# Patient Record
Sex: FEMALE | Race: WHITE | NOT HISPANIC OR LATINO | Employment: FULL TIME | URBAN - METROPOLITAN AREA
[De-identification: names, ages, dates, MRNs, and addresses within clinical notes are randomized per-mention and may not be internally consistent; named-entity substitution may affect disease eponyms.]

---

## 2019-04-08 ENCOUNTER — OFFICE VISIT (OUTPATIENT)
Dept: FAMILY MEDICINE | Facility: CLINIC | Age: 29
End: 2019-04-08
Payer: COMMERCIAL

## 2019-04-08 VITALS
OXYGEN SATURATION: 100 % | WEIGHT: 149 LBS | DIASTOLIC BLOOD PRESSURE: 77 MMHG | SYSTOLIC BLOOD PRESSURE: 121 MMHG | HEART RATE: 72 BPM

## 2019-04-08 DIAGNOSIS — R22.1 NECK FULLNESS: Primary | ICD-10-CM

## 2019-04-08 DIAGNOSIS — S86.912S STRAIN OF LEFT KNEE, SEQUELA: ICD-10-CM

## 2019-04-08 DIAGNOSIS — E04.9 THYROID ENLARGED: ICD-10-CM

## 2019-04-08 DIAGNOSIS — M79.675 PAIN OF TOE OF LEFT FOOT: ICD-10-CM

## 2019-04-08 DIAGNOSIS — I73.00 RAYNAUD'S DISEASE WITHOUT GANGRENE: ICD-10-CM

## 2019-04-08 PROBLEM — S86.912A STRAIN OF LEFT KNEE: Status: ACTIVE | Noted: 2019-04-08

## 2019-04-08 LAB
T3 SERPL-MCNC: 101 NG/DL (ref 60–181)
T4 FREE SERPL-MCNC: 0.97 NG/DL (ref 0.76–1.46)
TSH SERPL DL<=0.005 MIU/L-ACNC: 1.46 MU/L (ref 0.4–4)

## 2019-04-08 RX ORDER — CHOLECALCIFEROL (VITAMIN D3) 50 MCG
1 TABLET ORAL DAILY
COMMUNITY
Start: 2019-04-08 | End: 2021-07-30

## 2019-04-08 SDOH — HEALTH STABILITY: MENTAL HEALTH: HOW MANY STANDARD DRINKS CONTAINING ALCOHOL DO YOU HAVE ON A TYPICAL DAY?: 1 OR 2

## 2019-04-08 SDOH — HEALTH STABILITY: MENTAL HEALTH: HOW OFTEN DO YOU HAVE A DRINK CONTAINING ALCOHOL?: 2-3 TIMES A WEEK

## 2019-04-08 NOTE — PROGRESS NOTES
"Establishing Care Visit - New Patient         HPI         Concerns today:     Neck fullness/ Enlarged Thyroid  Sisters are nurses, they noticed it 2 weeks ago  Looking back at photos, may have had some enlargement starting 1 year ago.   No temperature intolerance  No increased appetite, no sleepiness, no rashes, no hair changes, no vision changes  Some fatigue (typical for her this time of year)  Always gets 8 hours of sleep  No issues with swallowing  Diagnosed with Raynauds many years ago, no symptoms for sevearl years.   Maternal GF and Paternal aunt had \"thyroid issues\"    Left big toe pain  Started 1 month ago. History of right metatarsal fracture at age 18, thinks she has compensated for it with her left. Pain worse after walking. No discoloration. Mom has \"toe arthritis\" and a hip bone spur, but no autoimmune disease. No family history of goit. 500 mile walk [place] in 2017, had a lot of \"foot conditions\" afterwards including numb toes.     Left knee pain  Fell on ice several weeks ago, fell on right knee.   Worst with walking, lifting boxes and pivoting.     Irregular periods  cycle length 45(Dec)- 35 -35  No excessive menstrual pain/cramping    Patient Active Problem List   Diagnosis     Strain of left knee     Raynaud's disease without gangrene     Pain of toe of left foot       Past Medical History:   Diagnosis Date     History of ITP 1995       Previous Medical Care   [facility]  IUD removed 1 year ago     Family History   Problem Relation Age of Onset     Thyroid Disease Maternal Grandfather 45     Heart Disease Maternal Grandfather      Valvular heart disease Maternal Grandfather      Thyroid Disease Paternal Aunt         maybe     Melanoma Maternal Grandmother 60     Diabetes No family hx of      Hyperlipidemia No family hx of               Review of Systems:     Review of Systems:  CONSTITUTIONAL: NEGATIVE for fever, chills, change in weight  INTEGUMENTARY/SKIN: NEGATIVE for worrisome " "rashes, moles or lesions  EYES: NEGATIVE for vision changes or irritation  ENT/MOUTH: NEGATIVE for ear, mouth and throat problems  RESP: NEGATIVE for significant cough or SOB  BREAST: NEGATIVE for masses, tenderness or discharge  CV: NEGATIVE for chest pain, palpitations or peripheral edema  GI: NEGATIVE for nausea, abdominal pain, heartburn,  + Change in BMs since doing Whole 30 starting March  : NEGATIVE for frequency, dysuria, or hematuria  MUSCULOSKELETAL: NEGATIVE for significant arthralgias or myalgia  NEURO: NEGATIVE for weaknessor paresthesias. + Dizziness 1 month ago that self resolved  ENDOCRINE: NEGATIVE for temperature intolerance, skin/hair changes  HEME/ALLERGY: NEGATIVE for bleeding problems  PSYCHIATRIC: high anxiety. Financial stress when looking for a job. Depression age [age], no depression since then. Even this past winter when very anxious did not feel the \"down of depression\". No anhedonia. Feels guilty about money. No difficulty concentrating. No thoughts of suicide.   Sleep:   Do you snore most or the night (as reported by a family member)? No  Do you feel sleepy or extremely tired during most of the day? Yes         Social History     Social History     Socioeconomic History     Marital status: Single     Spouse name: Not on file     Number of children: Not on file     Years of education: Not on file     Highest education level: Not on file   Occupational History     Not on file   Social Needs     Financial resource strain: Not on file     Food insecurity:     Worry: Not on file     Inability: Not on file     Transportation needs:     Medical: Not on file     Non-medical: Not on file   Tobacco Use     Smoking status: Never Smoker     Smokeless tobacco: Never Used   Substance and Sexual Activity     Alcohol use: Yes     Frequency: 2-3 times a week     Drinks per session: 1 or 2     Drug use: Never     Sexual activity: Yes     Partners: Male     Birth control/protection: Condom   Lifestyle "     Physical activity:     Days per week: Not on file     Minutes per session: Not on file     Stress: Not on file   Relationships     Social connections:     Talks on phone: Not on file     Gets together: Not on file     Attends Spiritism service: Not on file     Active member of club or organization: Not on file     Attends meetings of clubs or organizations: Not on file     Relationship status: Not on file     Intimate partner violence:     Fear of current or ex partner: Not on file     Emotionally abused: Not on file     Physically abused: Not on file     Forced sexual activity: Not on file   Other Topics Concern     Not on file   Social History Narrative     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 roomate    Has anyone hurt you physically, for example by pushing, hitting, slapping or kicking you or forcing you to have sex? Denies  Do you feel threatened or controlled by a partner, ex-partner or anyone in your life? Denies    Sexual Health     Sexual concerns: No   STI History: Neg  Pregnancy History: never been pregnant  LMP Patient's last menstrual period was 04/07/2019.   Last Pap Smear Date: No results found for: PAP - normal pap 1 year ago  Abnormal Pap History: None         Physical Exam:     Vitals: /77   Pulse 72   Wt 67.6 kg (149 lb)   LMP 04/07/2019   SpO2 100%   BMI= There is no height or weight on file to calculate BMI.   GENERAL: healthy, alert and no distress  EYES: Eyes grossly normal to inspection, extraocular movements - intact, and PERRL  HENT: ear canals- normal; TMs- normal; Nose- normal; Mouth- no ulcers, no lesions  NECK: no tenderness, no adenopathy, no asymmetry, no masses, no stiffness; thyroid- normal to palpation  NECK: no adenopathy and enlarged right hemithyroid aprox [id number]  RESP: lungs clear to auscultation - no rales, no rhonchi, no wheezes  CV: regular rates and rhythm,  normal S1 S2, no S3 or S4 and no murmur, no click or rub -  NEURO: strength and tone- normal, sensory exam- grossly normal, mentation- intact, speech- normal, reflexes- symmetric  PSYCH: Alert and oriented times 3; speech- coherent , normal rate and volume; able to articulate logical thoughts, able to abstract reason, no tangential thoughts, no hallucinations or delusions, mood and affect: anxious  LYMPHATICS: ant. cervical- normal, post. cervical- normal, axillary- normal, supraclavicular- normal, inguinal- normal    Results for orders placed or performed in visit on 04/08/19   T3 total   Result Value Ref Range    Triiodothyronine (T3) 101 60 - 181 ng/dL   Thyroid peroxidase antibody   Result Value Ref Range    Thyroid Peroxidase Antibody <10 <35 IU/mL   T4 free   Result Value Ref Range    T4 Free 0.97 0.76 - 1.46 ng/dL   TSH   Result Value Ref Range    TSH 1.46 0.40 - 4.00 mU/L         Assessment and Plan      Here is the plan from today's visit    Thyroid enlarged  Ddx: goiter, graves disease, hashimotos thyroiditis.  Given patient is concerned about finances and has a large deductible, she opted to do just the blood lab testing for now, and only get an ultrasound if absolutely necessary.   - US Thyroid; Future [phone]  - T3 total  - Thyroid peroxidase antibody  - T4 free  - TSH    Follow up plan  Follow up clinic visit in 4 weeks, sooner if concerns.     Options for treatment and follow-up care were reviewed with the patient . Yumiko Marquez and/or guardian engaged in the decision making process and verbalized understanding of the options discussed and agreed with the final plan.    Stephanie Keith MD       ADDENDUM 4/11/2019 4:04 PM  Left  for patient on 4/9/19 with lab results.   Called and spoke with patient today. Normal labs, still recommend US given asymmetry on exam. She plans to call and schedule that in May when she returns from a trip to [place].   -Stephanie Keith MD

## 2019-04-08 NOTE — LETTER
April 9, 2019      Yumiko Marquez  [address]  [place] [facility] [address]        Dear Yumiko,    Thank you for getting your care at [facility]. Please see below for your test results.    All of your thyroid testing is normal, this is very reassuring.     When someone has an enlarged thyroid (goiter) and no symptoms and no lab abnormalities it is safe to just monitor it with a physical exam and TSH lab level every year. It is still recommended you get an ultrasound at some point, but this can wait until you have better insurance coverage.     Resulted Orders   T3 total   Result Value Ref Range    Triiodothyronine (T3) 101 60 - 181 ng/dL   Thyroid peroxidase antibody   Result Value Ref Range    Thyroid Peroxidase Antibody <10 <35 IU/mL   T4 free   Result Value Ref Range    T4 Free 0.97 0.76 - 1.46 ng/dL   TSH   Result Value Ref Range    TSH 1.46 0.40 - 4.00 mU/L       If you have any concerns about these results please call and leave a message for me or send a [employer] message to the clinic.    Sincerely,    Stephanie Keith MD

## 2019-04-08 NOTE — PATIENT INSTRUCTIONS
Here is the plan from today's visit    Thyroid enlarged  - US Thyroid; Future [phone]  - T3 total  - Thyroid peroxidase antibody  - T4 free  - TSH  - CBC w/ diff    Follow up plan  Follow up clinic visit in 4 weeks, sooner if concerns.     Thank you for coming to [facility] today.  Lab Testing:  **If you had lab testing today and your results are reassuring or normal they will be mailed to you or sent through [employer] within 7 days.   **If the lab tests need quick action we will call you with the results.  The phone number we will call with results is # [phone] (home) . If this is not the best number please call our clinic and change the number.  Medication Refills:  If you need any refills please call your pharmacy and they will contact us.   If you need to  your refill at a new pharmacy, please contact the new pharmacy directly. The new pharmacy will help you get your medications transferred faster.   Scheduling:  If you have any concerns about today's visit or wish to schedule another appointment please call our office during normal business hours [phone] (8-5:00 M-F)  If a referral was made to a [facility] and you don't get a call from central scheduling please call [phone].  If a Mammogram was ordered for you at [facility] call [phone] to schedule or change your appointment.  If you had an XRay/CT/Ultrasound/MRI ordered the number is [phone] to schedule or change your radiology appointment.   Medical Concerns:  If you have urgent medical concerns please call [phone] at any time of the day.    Stephanie Keith MD

## 2019-04-09 LAB — THYROPEROXIDASE AB SERPL-ACNC: <10 IU/ML

## 2019-04-11 PROBLEM — E04.9 THYROID ENLARGED: Status: ACTIVE | Noted: 2019-04-11

## 2019-07-15 ENCOUNTER — TELEPHONE (OUTPATIENT)
Dept: FAMILY MEDICINE | Facility: CLINIC | Age: 29
End: 2019-07-15

## 2019-07-15 DIAGNOSIS — E04.9 THYROID ENLARGED: Primary | ICD-10-CM

## 2019-07-15 NOTE — TELEPHONE ENCOUNTER
[facility] phone call message- general phone call:    Reason for call: Patient calling to schedule thyroid ultrasound discussed in last OV. Patient is unsure if she was to have referral placed or if the ultrasound is done at [facility].    Action Desired: Call back    Return call needed: Yes    OK to leave a message on voice mail? Yes    Advised patient response may take up to 2 business days: Yes    Primary language: English      needed? No    Call taken on July 15, 2019 at 12:02 PM by Clemencia Yoo    Route to [facility] TRIAGE

## 2019-07-15 NOTE — TELEPHONE ENCOUNTER
RN will route to provider to place a new order if appropriate, as it appears as cancelled in the current order. Upon new order being placed, RN will call and give patient radiology number to schedule.  Corine Barbour RN

## 2019-07-17 NOTE — TELEPHONE ENCOUNTER
RN contacted patient and let her know that I will be forwarding this on to the covering provider and will contact her as soon as I hear back.  Corine Barbour RN

## 2019-07-17 NOTE — TELEPHONE ENCOUNTER
Patient called as she has not heard back regarding the US. She has high anxiety. I attempted to reach RN to speak with her but RN was not available. I did explain that the RN will call the patient back when another DR signs the US order (her PCP is on maternity leave).  If possible, could RN please call the patient today? She is wondering if she needs to see another provider to get the US signed, but has anxiety about OVs and financial issues.

## 2019-07-18 NOTE — TELEPHONE ENCOUNTER
Chart reviewed.  This was a discussion from April.  The patient had normal labs and at the time did not want to have an US done because of cost.  If she now wants the US, that is fine, and it's ordered.  But it is not an emergency.  If the patient is highly anxious and needs to come in and discuss her concerns, she is welcome to also make an appointment with a provider while Dr. Keith is out on maternity leave.    Laxmi Yung MD

## 2019-07-18 NOTE — TELEPHONE ENCOUNTER
RN contacted patient and informed her that it is ordered and reassured her that it is not emergently needed and let her know that we are happy to see her here to talk through her concerns if she would like to. She stated she has very bad general anxiety about going to MD appointments/offices. RN provided her with the radiology scheduling line.  Corine Barbour RN

## 2019-07-24 ENCOUNTER — ANCILLARY PROCEDURE (OUTPATIENT)
Dept: ULTRASOUND IMAGING | Facility: CLINIC | Age: 29
End: 2019-07-24
Attending: FAMILY MEDICINE
Payer: COMMERCIAL

## 2019-07-24 DIAGNOSIS — E04.9 THYROID ENLARGED: ICD-10-CM

## 2019-07-26 ENCOUNTER — TELEPHONE (OUTPATIENT)
Dept: FAMILY MEDICINE | Facility: CLINIC | Age: 29
End: 2019-07-26

## 2019-07-26 DIAGNOSIS — E04.1 RIGHT THYROID NODULE: Primary | ICD-10-CM

## 2019-07-26 DIAGNOSIS — E04.2 MULTIPLE THYROID NODULES: ICD-10-CM

## 2019-07-26 NOTE — TELEPHONE ENCOUNTER
"Called patient to relay message per PCP request, unable to get hold of the patient, left message to call back the clinic.    When patient returns phone call, please transfer to RN    Per covering provider's message,   1-\"Pt seen once by Dr. Keith for neck fullness. Had normal thyroid labs but recent US shows multiple R sided nodules that meet criteria for fine needle aspiration biopsy. Need to determine cause of nodules so we know if any treatment is needed\"   2- \"I have placed order for FNA and for endocrinology consult to be scheduled at later date\", MEETA Barriga MD    Message routed to Triage RN pool, will continue to follow up.    Rahat He, RN      "

## 2019-07-26 NOTE — PROGRESS NOTES
Pt seen once by Dr. Keith in 4/19 with CC of neck fullness. Exam confirmed thyroid asymmetry. TSH, free T4 WNL. Delayed thyroid US, results just back and show multiple R sided nodules meeting criteria for FNA. This will be ordered today.     RN to contact pt about scheduling. Referral to Endocrinology will be placed as well for result review and further management plan.

## 2019-07-29 NOTE — TELEPHONE ENCOUNTER
"Called ans spoke with the patient to follow up and relay results message, all pertinent information given, including the need for 'needle aspiration for biopsy\" pt denies having any abnormal symptoms at this time, requested to have \"either phone conversation/office visit to provider to farther discuss about treatment/endocrinology. Notified that message will be routed to provider.    Rahat He RN    "

## 2019-07-29 NOTE — TELEPHONE ENCOUNTER
Patient returned RN calls but RN was not available. Please call the patient back today before 3:30.

## 2019-08-01 NOTE — TELEPHONE ENCOUNTER
Spoke with Yumiko and reviewed thyroid US findings which show 3 nodules which meet criteria for FNA. Referral already made for procedure and endo consult. She is anxious about cost as she has insurance with high deductible. Advised to proceed. Offered visit with  if she wants to discuss her insurance plan.     Reports understanding and will schedule. Available if additional questions in Dr. Alex's absence.     Nicky Barriga MD

## 2019-08-07 NOTE — TELEPHONE ENCOUNTER
RECORDS RECEIVED FROM: Internal - Dr Barriga - GILA Morales   DATE RECEIVED: 8/15/19   NOTES (FOR ALL VISITS) STATUS DETAILS   OFFICE NOTES from referring provider Internal 7/26/19 telephone encounter  4/8/19   OFFICE NOTES from other specialist N/A    ED NOTES N/A    OPERATIVE REPORT  (thyroid, pituitary, adrenal, parathyroid) N/A    MEDICATION LIST Internal    IMAGING      DEXASCAN N/A    MRI (BRAIN) N/A    XR (Chest) N/A    CT (HEAD/NECK/CHEST/ABDOMEN) N/A    NUCLEAR  N/A    ULTRASOUND (HEAD/NECK) Internal  [facility]:  US Thyroid 7/24/19   LABS     DIABETES: HBGA1C, CREATININE, FASTING LIPIDS, MICROALBUMIN URINE, POTASSIUM, TSH, T4    THYROID: TSH, T4, CBC, THYRODLONULIN, TOTAL T3, FREE T4, CALCITONIN, CEA Internal   4/8/19

## 2019-08-15 ENCOUNTER — OFFICE VISIT (OUTPATIENT)
Dept: ENDOCRINOLOGY | Facility: CLINIC | Age: 29
End: 2019-08-15
Payer: COMMERCIAL

## 2019-08-15 ENCOUNTER — PRE VISIT (OUTPATIENT)
Dept: ENDOCRINOLOGY | Facility: CLINIC | Age: 29
End: 2019-08-15

## 2019-08-15 VITALS
WEIGHT: 155.3 LBS | HEIGHT: 67 IN | HEART RATE: 65 BPM | DIASTOLIC BLOOD PRESSURE: 79 MMHG | BODY MASS INDEX: 24.37 KG/M2 | SYSTOLIC BLOOD PRESSURE: 128 MMHG

## 2019-08-15 DIAGNOSIS — E04.1 THYROID NODULE: Primary | ICD-10-CM

## 2019-08-15 ASSESSMENT — PAIN SCALES - GENERAL: PAINLEVEL: NO PAIN (0)

## 2019-08-15 ASSESSMENT — MIFFLIN-ST. JEOR: SCORE: 1462.07

## 2019-08-15 NOTE — PATIENT INSTRUCTIONS
Thyroid FNA is ordered for you, please call to schedule.     To schedule with our Imaging Department, please call: [phone]    Simi Daugherty: [phone]    -------------------------------------------------------------------------------------------------

## 2019-08-15 NOTE — LETTER
8/15/2019       RE: Yumiko Marquez  [address]  [place] [address]     Dear Colleague,    Thank you for referring your patient, Yumiko Marquez, to the [facility] ENDOCRINOLOGY at [facility]. Please see a copy of my visit note below.    Endocrine Clinic Consult:     Reason for consult: Patient presents with:  RECHECK: thyroid nodule    Date: 08/15/2019  Referring Physician: Stephanie Keith Stephon    =================================  Assessment   Yumiko Marquez is a 28 year old female who is here for evaluation of       Thyroid Nodule:  Multiple thyroid nodules on the right side. The R#3 did not appear as a nodule but compressed normal thyroid tissue.   Both nodules #1 and #2 are low risk.   Concern for financial burden for the procedures.     We discussed about thyroid nodules and management in great detail. Majority of thyroid nodules are benign. Majority of thyroid cancers are slow growing and have good prognosis but a small number tend to be aggressive.     Thyroid FNA for further evaluation of larger nodules is next best step.   Outcomes at FNA including nondiagnostic, benign, malignant and indeterminate diagnosis were discussed.   The nondiagnostic and indeterminate diagnosis may necessitate repeat FNA   Molecular testing was discussed.     Further, right sided nodules have appearance of low risk on ultrasound  Left sided thyroid has no nodules.   Cosmetically, patient finds is difficult to accept the appearance and also she does not like the fact that she has a nodule in her neck that is not normal.   Yumiko is contemplating surgery because of this but at the same time, worried about the cost of FNA / Procedures.   I told her that FNA could cost >1000 out of pocket and also that if she is decided on doing a surgery, then most likely getting surgical opinion first before the biopsy is not unreasonable.     Sofía Floyd MD  [id number]  Endocrinology  Service      Patient Instructions   Thyroid FNA is ordered for you, please call to schedule.     To schedule with our Imaging Department, please call: [phone]    Simi Daugherty: [phone]    -------------------------------------------------------------------------------------------------          Orders Placed This Encounter   Procedures     US Biopsy Thyroid Fine Needle Aspiration      ====================================    HPI:   Yumiko Marquez is a 28 year old female who is seen for initial consultation.   she  has a past medical history of History of ITP (1995)..   -- she was having dinner with sisters who are nursing student who noted thyroid nodules.   -- dedicated thyroid ultrasound showed 2 nodules which were low risk on appearance.   -- she saw PCP and was referred to endocrine clinic for further management.   -- no local compression.       Symptoms Details   Temp intolerance No    Palpitations No    SOB No    Appetite No change    Weight changes Stable wt   Change in BM No    Diet No change   Exercise Orange theory and yoga.    Energy Levels Good    Sleep 8 hrs per day   Mental status change No change   Anxiety about nodules   Skin or hair changes No change    Extremity No edema   Menses Regular now. 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   Works from home  Lived in [place] x 6 years.            Relevant History Details   PMH  Autoimmune disease  Neck pain       PSH Thyroid surgery ?        Family history of thyroid / AI disease MGF thyroid nodules. Was benign. He attributes this to radiation fall out in [place] back in the day.        Family history of thyroid nodule  As above.                  Other ROS:     No eye symptoms  No headache, change in vision, loss of peripheral vision  Complete ROS that were obtained were negative.     Past Medical History:   Diagnosis Date     History of ITP 1995     Past Surgical  History:   Procedure Laterality Date     APPENDECTOMY  2001     TONSILLECTOMY       Family History   Problem Relation Age of Onset     Thyroid Disease Maternal Grandfather 45     Heart Disease Maternal Grandfather      Valvular heart disease Maternal Grandfather      Thyroid Disease Paternal Aunt         maybe     Melanoma Maternal Grandmother 60     Diabetes No family hx of      Hyperlipidemia No family hx of      Social History     Socioeconomic History     Marital status: Single     Spouse name: Not on file     Number of children: Not on file     Years of education: Not on file     Highest education level: Not on file   Occupational History     Not on file   Social Needs     Financial resource strain: Not on file     Food insecurity:     Worry: Not on file     Inability: Not on file     Transportation needs:     Medical: Not on file     Non-medical: Not on file   Tobacco Use     Smoking status: Never Smoker     Smokeless tobacco: Never Used   Substance and Sexual Activity     Alcohol use: Yes     Frequency: 2-3 times a week     Drinks per session: 1 or 2     Drug use: Never     Sexual activity: Yes     Partners: Male     Birth control/protection: Condom   Lifestyle     Physical activity:     Days per week: Not on file     Minutes per session: Not on file     Stress: Not on file   Relationships     Social connections:     Talks on phone: Not on file     Gets together: Not on file     Attends Confucianist service: Not on file     Active member of club or organization: Not on file     Attends meetings of clubs or organizations: Not on file     Relationship status: Not on file     Intimate partner violence:     Fear of current or ex partner: Not on file     Emotionally abused: Not on file     Physically abused: Not on file     Forced sexual activity: Not on file   Other Topics Concern     Not on file   Social History Narrative     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            Exam:  /79   Pulse 65   Ht 1.702 m (5' 7\")   Wt 70.4 kg (155 lb 4.8 oz)   BMI 24.32 kg/m      Constitutional: healthy  Head: Normocephalic. No masses, lesions, tenderness or abnormalities  Neck: Neck supple. No adenopathy. Thyroid visible thyroid nodule on right, Carotids without bruits.  ENT: No throat congestion, no neck nodes or sinus tenderness  Cardiovascular: regular rhythm, no tachycardia  Respiratory: Lungs clear  Gastrointestinal: Abdomen soft, non-tender. BS normal. No striae  Musculoskeletal: gait normal and normal muscle tone  Neurologic: Normal speech, reflexes normal.   Psychiatric: mentation appears normal       TSH   Date Value Ref Range Status   04/08/2019 1.46 0.40 - 4.00 mU/L Final       T4 Free   Date Value Ref Range Status   04/08/2019 0.97 0.76 - 1.46 ng/dL Final   ]    CBC RESULTS: No results for input(s): WBC, RBC, HGB, HCT, MCV, MCH, MCHC, RDW, PLT in the last 96261 hours.  No results for input(s): NA, POTASSIUM, CHLORIDE, CO2, ANIONGAP, GLC, BUN, CR, AMELIA in the last 18161 hours.            Again, thank you for allowing me to participate in the care of your patient.      Sincerely,    Sofía Floyd MD      "

## 2019-08-15 NOTE — PROGRESS NOTES
Endocrine Clinic Consult:     Reason for consult: Patient presents with:  RECHECK: thyroid nodule    Date: 08/15/2019  Referring Physician: Stephanie Keith    =================================  Assessment   Yumiko Marquez is a 28 year old female who is here for evaluation of       Thyroid Nodule:  Multiple thyroid nodules on the right side. The R#3 did not appear as a nodule but compressed normal thyroid tissue.   Both nodules #1 and #2 are low risk.   Concern for financial burden for the procedures.     We discussed about thyroid nodules and management in great detail. Majority of thyroid nodules are benign. Majority of thyroid cancers are slow growing and have good prognosis but a small number tend to be aggressive.     Thyroid FNA for further evaluation of larger nodules is next best step.   Outcomes at FNA including nondiagnostic, benign, malignant and indeterminate diagnosis were discussed.   The nondiagnostic and indeterminate diagnosis may necessitate repeat FNA   Molecular testing was discussed.     Further, right sided nodules have appearance of low risk on ultrasound  Left sided thyroid has no nodules.   Cosmetically, patient finds is difficult to accept the appearance and also she does not like the fact that she has a nodule in her neck that is not normal.   Yumiko is contemplating surgery because of this but at the same time, worried about the cost of FNA / Procedures.   I told her that FNA could cost >1000 out of pocket and also that if she is decided on doing a surgery, then most likely getting surgical opinion first before the biopsy is not unreasonable.     Sofía Floyd MD  [id number]  Endocrinology Service      Patient Instructions   Thyroid FNA is ordered for you, please call to schedule.     To schedule with our Imaging Department, please call: [phone]    Simi Daugherty:  [phone]    -------------------------------------------------------------------------------------------------          Orders Placed This Encounter   Procedures     US Biopsy Thyroid Fine Needle Aspiration      ====================================    HPI:   Yumiko Marquez is a 28 year old female who is seen for initial consultation.   she  has a past medical history of History of ITP (1995)..   -- she was having dinner with sisters who are nursing student who noted thyroid nodules.   -- dedicated thyroid ultrasound showed 2 nodules which were low risk on appearance.   -- she saw PCP and was referred to endocrine clinic for further management.   -- no local compression.       Symptoms Details   Temp intolerance No    Palpitations No    SOB No    Appetite No change    Weight changes Stable wt   Change in BM No    Diet No change   Exercise Orange theory and yoga.    Energy Levels Good    Sleep 8 hrs per day   Mental status change No change   Anxiety about nodules   Skin or hair changes No change    Extremity No edema   Menses Regular now. 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   Works from home  Lived in [place] x 6 years.            Relevant History Details   PMH  Autoimmune disease  Neck pain       PSH Thyroid surgery ?        Family history of thyroid / AI disease MGF thyroid nodules. Was benign. He attributes this to radiation fall out in [place] back in the day.        Family history of thyroid nodule  As above.                  Other ROS:     No eye symptoms  No headache, change in vision, loss of peripheral vision  Complete ROS that were obtained were negative.     Past Medical History:   Diagnosis Date     History of ITP 1995     Past Surgical History:   Procedure Laterality Date     APPENDECTOMY  2001     TONSILLECTOMY       Family History   Problem Relation Age of Onset     Thyroid Disease Maternal Grandfather 45      Heart Disease Maternal Grandfather      Valvular heart disease Maternal Grandfather      Thyroid Disease Paternal Aunt         maybe     Melanoma Maternal Grandmother 60     Diabetes No family hx of      Hyperlipidemia No family hx of      Social History     Socioeconomic History     Marital status: Single     Spouse name: Not on file     Number of children: Not on file     Years of education: Not on file     Highest education level: Not on file   Occupational History     Not on file   Social Needs     Financial resource strain: Not on file     Food insecurity:     Worry: Not on file     Inability: Not on file     Transportation needs:     Medical: Not on file     Non-medical: Not on file   Tobacco Use     Smoking status: Never Smoker     Smokeless tobacco: Never Used   Substance and Sexual Activity     Alcohol use: Yes     Frequency: 2-3 times a week     Drinks per session: 1 or 2     Drug use: Never     Sexual activity: Yes     Partners: Male     Birth control/protection: Condom   Lifestyle     Physical activity:     Days per week: Not on file     Minutes per session: Not on file     Stress: Not on file   Relationships     Social connections:     Talks on phone: Not on file     Gets together: Not on file     Attends [employer] service: Not on file     Active member of club or organization: Not on file     Attends meetings of clubs or organizations: Not on file     Relationship status: Not on file     Intimate partner violence:     Fear of current or ex partner: Not on file     Emotionally abused: Not on file     Physically abused: Not on file     Forced sexual activity: Not on file   Other Topics Concern     Not on file   Social History Narrative     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   "          Exam:  /79   Pulse 65   Ht 1.702 m (5' 7\")   Wt 70.4 kg (155 lb 4.8 oz)   BMI 24.32 kg/m     Constitutional: healthy  Head: Normocephalic. No masses, lesions, tenderness or abnormalities  Neck: Neck supple. No adenopathy. Thyroid visible thyroid nodule on right, Carotids without bruits.  ENT: No throat congestion, no neck nodes or sinus tenderness  Cardiovascular: regular rhythm, no tachycardia  Respiratory: Lungs clear  Gastrointestinal: Abdomen soft, non-tender. BS normal. No striae  Musculoskeletal: gait normal and normal muscle tone  Neurologic: Normal speech, reflexes normal.   Psychiatric: mentation appears normal       TSH   Date Value Ref Range Status   04/08/2019 1.46 0.40 - 4.00 mU/L Final       T4 Free   Date Value Ref Range Status   04/08/2019 0.97 0.76 - 1.46 ng/dL Final   ]    CBC RESULTS: No results for input(s): WBC, RBC, HGB, HCT, MCV, MCH, MCHC, RDW, PLT in the last 53006 hours.  No results for input(s): NA, POTASSIUM, CHLORIDE, CO2, ANIONGAP, GLC, BUN, CR, AMELIA in the last 35455 hours.          "

## 2019-10-04 ENCOUNTER — ANCILLARY PROCEDURE (OUTPATIENT)
Dept: ULTRASOUND IMAGING | Facility: CLINIC | Age: 29
End: 2019-10-04
Attending: INTERNAL MEDICINE
Payer: COMMERCIAL

## 2019-10-04 DIAGNOSIS — E04.1 THYROID NODULE: Primary | ICD-10-CM

## 2019-10-04 RX ORDER — LIDOCAINE HYDROCHLORIDE 10 MG/ML
5 INJECTION, SOLUTION INFILTRATION; PERINEURAL ONCE
Status: COMPLETED | OUTPATIENT
Start: 2019-10-04 | End: 2019-10-04

## 2019-10-04 RX ADMIN — LIDOCAINE HYDROCHLORIDE 3 ML: 10 INJECTION, SOLUTION INFILTRATION; PERINEURAL at 11:49

## 2019-10-07 LAB — COPATH REPORT: NORMAL

## 2019-10-09 ENCOUNTER — MYC MEDICAL ADVICE (OUTPATIENT)
Dept: ENDOCRINOLOGY | Facility: CLINIC | Age: 29
End: 2019-10-09

## 2019-10-09 NOTE — RESULT ENCOUNTER NOTE
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   Although risk of malignancy is much lower than higher risk categories, the risk of malignancy in this category could range from 25-40% at surgery.   The options are to remove the half of the gland (lobectomy) or to do molecular test to assess for genes that are associated with malignancy. If the molecular test shows benign results risk of malignancy lowers to 4% or so. If it states that nodule is suspicious, there is 50% chance of malignancy in which case surgical removal is recommended.     Let me know what you prefer as next step.     Best regards,   Sofía Floyd MD  Endocrinology Service

## 2019-10-14 ENCOUNTER — MYC MEDICAL ADVICE (OUTPATIENT)
Dept: ENDOCRINOLOGY | Facility: CLINIC | Age: 29
End: 2019-10-14

## 2019-10-14 DIAGNOSIS — E04.1 THYROID NODULE: Primary | ICD-10-CM

## 2019-10-15 NOTE — TELEPHONE ENCOUNTER
Per Dr. Floyd, pt requesting to schedule referral with Dr. Camargo. Please call to schedule. Thanks!

## 2019-10-16 NOTE — TELEPHONE ENCOUNTER
FUTURE VISIT INFORMATION      FUTURE VISIT INFORMATION:    Date: 11/11/19    Time: 1PM    [place] [facility] [place]  REFERRAL INFORMATION:    Referring provider:  Sofía Floyd MD    Referring providers clinic:  [facility]     Reason for visit/diagnosis  Thyroid nodule     RECORDS REQUESTED FROM:       Clinic name Comments Records Status Imaging Status   [facility]  10/14/19 referral  8/15/19 notes with Dr Floyd  EPIC    [facility] 10/4/19 Us FNA Thyroid  7/24/19 Us THyroid EPIC PACS

## 2019-10-18 ENCOUNTER — TELEPHONE (OUTPATIENT)
Dept: ENDOCRINOLOGY | Facility: CLINIC | Age: 29
End: 2019-10-18

## 2019-10-18 NOTE — TELEPHONE ENCOUNTER
Message given to  Get evaluated by  Surgery first. Appointment is set up. Inessa Conde RN on 10/18/2019 at 6:38 PM

## 2019-10-18 NOTE — TELEPHONE ENCOUNTER
"----- Message from Sofía Floyd MD sent at 10/16/2019 12:29 PM CDT -----  Regarding: RE: Part 2 additional information  She should get evaluated by surgery first.   Sofía Floyd MD  Endocrinology Service    ----- Message -----  From: Lesli White RN  Sent: 10/14/2019  11:14 AM CDT  To: Sofía Floyd MD  Subject: Part 2 additional information                    Pt also wanted to inform you:   \"I don't feel the need to schedule an appointment if I can get the information I need through this chat system. I'm just eager to schedule surgery and understand the urgency/timing. If I can safely wait until the week of November 18, that's when I would prefer to schedule surgery.   -Yumiko\"       "

## 2019-10-24 ENCOUNTER — PREP FOR PROCEDURE (OUTPATIENT)
Dept: OTOLARYNGOLOGY | Facility: CLINIC | Age: 29
End: 2019-10-24

## 2019-10-24 ENCOUNTER — OFFICE VISIT (OUTPATIENT)
Dept: SURGERY | Facility: CLINIC | Age: 29
End: 2019-10-24

## 2019-10-24 VITALS
DIASTOLIC BLOOD PRESSURE: 69 MMHG | BODY MASS INDEX: 24.61 KG/M2 | HEART RATE: 69 BPM | SYSTOLIC BLOOD PRESSURE: 101 MMHG | HEIGHT: 67 IN | WEIGHT: 156.8 LBS | OXYGEN SATURATION: 100 %

## 2019-10-24 DIAGNOSIS — E04.1 THYROID NODULE: Primary | ICD-10-CM

## 2019-10-24 DIAGNOSIS — E04.2 MULTIPLE THYROID NODULES: Primary | ICD-10-CM

## 2019-10-24 PROCEDURE — 99203 OFFICE O/P NEW LOW 30 MIN: CPT | Performed by: SURGERY

## 2019-10-24 ASSESSMENT — PAIN SCALES - GENERAL: PAINLEVEL: NO PAIN (0)

## 2019-10-24 ASSESSMENT — MIFFLIN-ST. JEOR: SCORE: 1463.87

## 2019-10-24 NOTE — PATIENT INSTRUCTIONS
Surgery Instructions    Always follow your surgeon s instructions. If you don t, your surgery could be cancelled. Please use the following checklist.  Your surgery is on: The surgery scheduler will contact you within 1 week of your consult with the surgeon. If you do not hear from them, please call the clinic or RN Care Coordinator for your provider.    Time: Prearrival times can vary depending on location/type of surgery.  [place] - 2 hour pre-arrival  [facility] [place] - 2 hour pre-arrival  [place] - 1 hour pre-arrival    Note:  These times may change. A nurse will call you before surgery to confirm. If you have not received a call or if you have more questions, please call us on the working day before your surgery:  ? [place]: [phone] or [phone] (9am to 5:30pm)  ? [facility] [place]: [phone] (8am to 6pm)  ? [place]: [phone] (9am to 5pm)  ? [facility] [phone] (7am to 4pm)  Prior to surgery  ? Have a pre-op physical exam with your Primary Doctor within 30 days of surgery  - Ask your doctor to send all of your results to the surgery center/hospital before surgery. Your doctor also may ask you to bring the results with you on the day of surgery.  - Tell your doctor if:  - You are allergic to latex or rubber (latex and rubber gloves are often used in medical care).  - You are taking any medicines (including aspirin), vitamins, or herbal products. You may need to stop taking some medicines before surgery.  - You have any medical problems (allergies, diabetes, or heart disease, for example).  - You have a pacemaker or an AICD (automatic implanted cardiac defibrillator). If you do, please bring the ID card with you on the day of surgery.  - People who smoke have a higher risk of infection after surgery. Ask your doctor how you can quit smoking.  - If you Primary Doctor is not within the [facility] system, you will need to have your pre-op physical faxed to us to be scanned into your  chart.  - [place]: [phone] or [phone]  - [facility] ([place]): [phone]  - [facility] [place] [place] [phone]  ? Call your insurance company. Ask if you need pre-approval for your surgery. If you do not have insurance, please let us know. If you wish to speak to the , please alert the clinic staff so this can be arranged.  ? Arrange for someone to drive you home after surgery.  will need to be a responsible adult (18 years or older) that will provide transportation to and from surgery and stay in the waiting room during your surgery. You may not drive yourself or take public transportation to and from surgery.  ? Arrange for someone to stay with you for 24 hours after you go home. This person must be a responsible adult (18 years or older).  ? Call your surgeon or their nurse if there is any change in your health (cold, flu, infections, hospitalizations).  ? Do not smoke, drink alcohol, or take over-the-counter medicine for 24 hours before and after surgery.  ? If you take prescribed drugs, you may need to stop them until after the surgery.  Discuss what medications to take or not take prior to surgery with your Primary Doctor at your pre-op physical. Avoid over-the-counter blood-thinning medications such as Aspirin, Ibuprofen, vitamin E, or fish oil 7 days prior to surgery (unless otherwise directed by your Primary Doctor). Tylenol is a good alternative for mild pain relief prior to surgery.  ? Eating and drinking guidelines prior to surgery:  - Stop all solid food consumption 8 hours prior to surgery  - You may drink clear liquids up to 2 hours prior to surgery (water, fruit juices without pulp, jello, tea/coffee without creamer, sports drinks, clear-fat free broth (bouillon or consomme), popsicles (without milk, bits of fruit, or seeds/nuts)  ? Follow instructions given for showering or bathing before surgery.    - Use 8 ounces of antiseptic surgical soap,  like:  - Hibiclens, Scrub Care, or Exidine  - You can find it at your local pharmacy, clinic, or retail store. If you have trouble, ask your pharmacist to help you find the right substitute.  - Please wash with one of the above soaps twice before coming to the hospital for your surgery. This will decrease bacteria (germs) on your skin. It will also help reduce your chance of infection after surgery.  - Items you will need for showering:  - 4 newly washed washcloths  - 2 newly washed towels  - 8 ounces of one of the above soaps  - Following these instructions:  - The evening before surgery: Shower or bathe as you normally would, using your regular soap and a clean washcloth. Give special attention to places where your incision (surgical cut) or catheters will be. This includes your groin area. Rinse well. You may wash your hair with your regular shampoo. Next, wash your body with 4 ounces of the antiseptic soap. Use a clean, damp washcloth and gently clean your body (from the chin down). If your surgery involves your head, use the special soap on your head and scalp. Rinse well and dry off using a newly washed towel.  - The morning of surgery: Repeat the same process as the evening shower.  - Other suggestions:    Do not shave within 12 inches of your incision (surgical cut) area for at least 3 days before surgery. Shaving can make small cuts in the skin. This puts you at higher risk of infection.    Wear freshly washed pajamas or clothing after your evening shower.    Wear freshly washed clothes the day of surgery.    Wash and change your bed sheets the day before surgery to have clean bed sheets after your shower and when you get home from surgery.    If you have trouble washing all areas, make sure someone helps you.    Don't use any deodorant, lotion or powder after your shower.    Women who are menstruating should wear a fresh sanitary pad to the hospital.  ? Do not wear or add deodorant, cologne, lotion,  makeup, nail polish or jewelry to surgery. If you wear fake nails, please remove at least one nail before coming to surgery (an oxygen monitor needs to be placed on your finger during surgery).  ? Bring these items to the surgery center/hospital:  - Insurance card  - Money for parking and co-pays, if needed  - A list of all the medicines you take. Include vitamins, minerals, herbs, and over-the-counter drugs.  Note any drug allergies.  - A copy of your advance health care directive, if you have one. This tells us what treatment you would want--and who would make health care decisions--if you could no longer speak for yourself. You may request this form in advance or download it from [email or web address].  - A case for glasses, contact lenses, hearing aids, or dentures.  - Your inhaler or CPAP machine, if you use these at home.  ? Leave extra cash, jewelry, and other valuables at home.  When you arrive  When you get to the surgery center/hospital, you will:  ? Check in. If you are under age 18, you must be with a parent or legal guardian.  ? Sign consent forms, if you haven t already. These forms state that you know the risks and benefits of surgery. When you sign the forms, you give us permission to do the surgery. Do not sign them unless you understand what will happen during and after your surgery. If you have any questions about your surgery, ask to speak with your doctor before you sign the forms. If you don t understand the answers, ask again.  ? Receive a copy of the Patient s Bill of Rights. If you do not receive a copy, please ask for one.  ? Change into hospital clothes. Your belongings will be placed in a bag. We will return them to you after surgery.  ? Meet with the anesthesia provider. He or she will tell you what kind of anesthesia (medicine) will be used to keep you comfortable during surgery.  Remember: it s okay to remind doctors and nurses to wash their hands before touching you.  In most  cases, your surgeon will use a marker to write his or her initials on the surgery site. This ensures that the exact site is operated on.  For safety reasons, we will ask you the same questions many times. For example, we may ask your name and birth date over and over again.  Friends and family can stay with you until it s time for surgery. While you re in surgery, they will be in the waiting area. Please note that cell phones are not allowed in some patient care areas.  If you have questions about what will happen in the operating room, talk to your care team.  After surgery  We will move you to a recovery room, where we will watch you closely. If you have any pain or discomfort, tell your nurse. He or she will try to make you comfortable.  If you are staying overnight, we will move you to your hospital room after you are awake.  If you are going home, we will move you to another room. Friends and family may be able to join you. The length of time you spend in recovery depend on the type of medicine you received, your medical condition, the type of surgery you had, or your response to the anesthesia given during your procedure.  When you are discharged from the recovery room, the nurses will review instructions with you and your caregiver.  ? Please wash your hands every time you touch the wound or change bandages or dressings.  ? Do not submerge the wound in water.  You may not use a bathtub or hot tub until the wound is closed. The wait time frame is generally 2-3 weeks, but any open area can be a source of incoming bacteria, so it is better to be on the safe side and avoid water submersion until your wound is fully healed.  ? You may take a shower 24 hours after surgery. Double check with your surgeon if it is OK for water to run over the wound, whether it has been sutured, stapled, glued, or is open. You may gently wash the wound using the antiseptic soap provided for your pre-surgery showering (do not use a  washcloth). Any mild soap will work as well.  ? Many surgical wounds will have small white strips of tape on them called steri-strips.  Do not remove these. The edges will curl and fall off within 7-10 days with normal showering.  ? If you are going home with sutures (stitches) or staples, you must return to the clinic to have them taken out, usually within 1-2 weeks. Some stitches are dissolvable and do not require removal. Make sure to clarify with your surgeon or surgery nurse reviewing discharge paperwork what kind of sutures you have.  ? Signs and symptoms of infection include:  - Fever, temperature over 101.5   F  - Redness  - Swelling  - Increased pain  - Green or yellow drainage which may or may not have a foul odor  Dealing with pain  A nurse will check your comfort level often during your stay. He or she will work with you to manage your pain.  Remember:  ? All pain is real. There are many ways to control pain. We can help you decide what works best for you.  ? Ask for pain medicine when you need it. Don t try to  tough it out --this can make you feel worse. Always take your medicine as ordered.  ? Medicine doesn t work the same for everyone. If your medicine isn t working, tell your nurse. There may be other medicines or treatments we can try.  Going home  We will let you know when you re ready to leave the surgery center or hospital. Before you leave, we will tell you how to care for yourself at home and prevent infections. If you do not understand something, please say so. We will answer any questions you have. We will then help you get ready to leave.  Remember, you must have a responsible adult (18 years or older) to stay with you 24 hours after you leave the hospital.  Take it easy when you get home. You will need some time to recover--you may be more tired than you realize at first. Rest and relax for at least the first 24 hours at home. You ll feel better and heal faster if you take good care of  yourself.  Follow the discharge instructions that are given to you when you leave the surgery center or hospital  Please call the clinic if you experience any problems during regular clinic hours (Monday-Friday 8:00am-5:00pm).  If you experience problems during non-clinic hours, please call the [facility] on-call line at [phone] and ask the  to page the on-call Provider for your specialty. The on-call Provider will call you back and can triage your symptoms and further advise. If you are having an emergency, always call 911 or seek immediate evaluation at the Emergency Room.  Location  [facility] ([facility] [place])  [address] [place] [id number]  [phone]   [email or web address]

## 2019-10-24 NOTE — NURSING NOTE
"Yumiko Marquez's goals for this visit include:   Chief Complaint   Patient presents with     Consult     Yumiko is visiting to discuss thyroid nodules     She requests these members of her care team be copied on today's visit information:     PCP: Stephanie Keith    Referring Provider:  No referring provider defined for this encounter.    /69   Pulse 69   Ht 1.702 m (5' 7\")   Wt 71.1 kg (156 lb 12.8 oz)   SpO2 100%   BMI 24.56 kg/m      Do you need any medication refills at today's visit? No    Quyen Cordova LPN    "

## 2019-10-24 NOTE — LETTER
10/24/2019         RE: Yumiko Marquez  [address]  [facility] [address]        Dear Colleague,    Thank you for referring your patient, Yumiko Marquez, to the [facility]. Please see a copy of my visit note below.    Visit Date:   10/24/2019      REASON FOR CONSULTATION:  I spent greater than 30 minutes in the care and consultation of this patient for surgical options for thyroid nodules.       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  She states she was having dinner with her sister who noted a large mass in her neck on the right side.  The patient then sought evaluation.  Indeed, the patient then had an ultrasound that identified multiple nodules in the right lobe, one measuring 1.6 cm, a second measuring 3.7 cm and a third measuring 2.3 cm.  Fine needle aspiration of the largest nodule was suspicious for follicular neoplasm.  The patient's thyroid function tests are within normal limits.      Regarding the thyroid nodule, the patient has no previous history of thyroid carcinoma.  No head and neck radiation exposure.  No problems with voice quality, inspiration or swallowing.  No symptoms of hypo- or hyperthyroidism.      PAST MEDICAL HISTORY, SURGICAL HISTORY, MEDICATIONS:  Have been reviewed and are available in the EMR.      REVIEW OF SYSTEMS:  A 10-point review of systems is pertinent for that noted in the HPI.      PHYSICAL EXAMINATION:  Just inspecting the neck, she clearly has an enlarged right lobe of her thyroid.  In palpating the neck, there is about a 4 cm nodule or fullness within the right lobe of the thyroid gland.  The left lobe palpates normally.  There are no masses on the left side.  The superior and inferior borders of the thyroid gland are palpable with swallowing.      Ultrasound, pathology, and thyroid function tests were all discussed in the HPI.      ASSESSMENT:  Large right thyroid  nodules suspicious for follicular neoplasm.      PLAN:  Based on the above, I would recommend the patient undergo a right thyroid lobectomy and isthmusectomy.  The surgical procedure was discussed with the patient, including but not limited to the risks of bleeding, infection, injury to the recurrent laryngeal nerve or nerves, potential permanent hypocalcemia.  The patient is aware of this and agreed to proceed with surgery and consent was obtained, the site was marked.  We will schedule her for surgery accordingly.         OLIVIA POND MD             D: 2019   T: 2019   MT: LE      Name:     CYNTHIA ALEJANDRO   MRN:      [id number]        Account:      [id number]   :      1990           Visit Date:   10/24/2019      Document: [id number]       Again, thank you for allowing me to participate in the care of your patient.        Sincerely,        Olivia Pond MD

## 2019-10-29 PROBLEM — E04.1 THYROID NODULE: Status: ACTIVE | Noted: 2019-10-29

## 2019-11-11 ENCOUNTER — PRE VISIT (OUTPATIENT)
Dept: OTOLARYNGOLOGY | Facility: CLINIC | Age: 29
End: 2019-11-11

## 2019-11-17 NOTE — PROGRESS NOTES
Visit Date:   10/24/2019      REASON FOR CONSULTATION:  I spent greater than 30 minutes in the care and consultation of this patient for surgical options for thyroid nodules.       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  She states she was having dinner with her sister who noted a large mass in her neck on the right side.  The patient then sought evaluation.  Indeed, the patient then had an ultrasound that identified multiple nodules in the right lobe, one measuring 1.6 cm, a second measuring 3.7 cm and a third measuring 2.3 cm.  Fine needle aspiration of the largest nodule was suspicious for follicular neoplasm.  The patient's thyroid function tests are within normal limits.      Regarding the thyroid nodule, the patient has no previous history of thyroid carcinoma.  No head and neck radiation exposure.  No problems with voice quality, inspiration or swallowing.  No symptoms of hypo- or hyperthyroidism.      PAST MEDICAL HISTORY, SURGICAL HISTORY, MEDICATIONS:  Have been reviewed and are available in the EMR.      REVIEW OF SYSTEMS:  A 10-point review of systems is pertinent for that noted in the HPI.      PHYSICAL EXAMINATION:  Just inspecting the neck, she clearly has an enlarged right lobe of her thyroid.  In palpating the neck, there is about a 4 cm nodule or fullness within the right lobe of the thyroid gland.  The left lobe palpates normally.  There are no masses on the left side.  The superior and inferior borders of the thyroid gland are palpable with swallowing.      Ultrasound, pathology, and thyroid function tests were all discussed in the HPI.      ASSESSMENT:  Large right thyroid nodules suspicious for follicular neoplasm.      PLAN:  Based on the above, I would recommend the patient undergo a right thyroid lobectomy and isthmusectomy.  The surgical procedure was discussed with the patient, including but not limited to the risks of  bleeding, infection, injury to the recurrent laryngeal nerve or nerves, potential permanent hypocalcemia.  The patient is aware of this and agreed to proceed with surgery and consent was obtained, the site was marked.  We will schedule her for surgery accordingly.         JIGNESH POND MD             D: 2019   T: 2019   MT: LE      Name:     CYNTHIA ALEJANDRO   MRN:      [id number]        Account:      [id number]   :      1990           Visit Date:   10/24/2019      Document: [id number]

## 2019-11-18 ENCOUNTER — OFFICE VISIT (OUTPATIENT)
Dept: FAMILY MEDICINE | Facility: CLINIC | Age: 29
End: 2019-11-18
Payer: COMMERCIAL

## 2019-11-18 VITALS
DIASTOLIC BLOOD PRESSURE: 77 MMHG | HEIGHT: 67 IN | SYSTOLIC BLOOD PRESSURE: 114 MMHG | BODY MASS INDEX: 23.89 KG/M2 | OXYGEN SATURATION: 97 % | HEART RATE: 70 BPM | TEMPERATURE: 98.4 F | RESPIRATION RATE: 16 BRPM | WEIGHT: 152.2 LBS

## 2019-11-18 DIAGNOSIS — Z01.818 PREOP GENERAL PHYSICAL EXAM: Primary | ICD-10-CM

## 2019-11-18 DIAGNOSIS — Z11.3 SCREEN FOR STD (SEXUALLY TRANSMITTED DISEASE): ICD-10-CM

## 2019-11-18 LAB
HCG UR QL: NEGATIVE
HCT VFR BLD AUTO: 44.3 % (ref 35–47)
HEMOGLOBIN: 14.2 G/DL (ref 11.7–15.7)
MCH RBC QN AUTO: 30 PG (ref 26.5–35)
MCHC RBC AUTO-ENTMCNC: 32.1 G/DL (ref 32–36)
MCV RBC AUTO: 93.7 FL (ref 78–100)
PLATELET # BLD AUTO: 349 K/UL (ref 150–450)
RBC # BLD AUTO: 4.73 M/UL (ref 3.8–5.2)
WBC # BLD AUTO: 9.6 K/UL (ref 4–11)

## 2019-11-18 ASSESSMENT — MIFFLIN-ST. JEOR: SCORE: 1443

## 2019-11-18 NOTE — PROGRESS NOTES
[facility]  [address],  [address]  [facility] [address]  Phone: [phone]  Fax: [phone]    11/18/2019    Adult PRE-OP Evaluation:    Yumiko Marquez, 1990 presents for pre-operative evaluation and assessment as requested by Dr. Camargo, prior to undergoing surgery/procedure for treatment of  Thyroid nodule .    Proposed procedure: right thyroidectomy and isthmusectomy    Date of Surgery/ Procedure: 11/26/19  Hospital/Surgical Facility: [facility]     Urinary urgency 11/15 which improved with increased PO intake    Primary Physician: Stephanie Keith  Type of Anesthesia Anticipated: General  History of anesthesia complications: . Vomited after wisdom teeth anesthesia  History of  abnormal bleeding: NONE   History of blood transfusions: NO    Preoperative Questions   1. NO - Do you have a history of heart attack, stroke, stent, bypass or surgery on an artery in the head, neck, heart or legs?  2. NO - Do you ever have any pain or discomfort in your chest?  3. NO - Have you ever had a severe pain across the front of your chest lasting for half an hour or more?  4. NO - Do you have a history of Congestive Heart Failure?  5. NO - Are you troubled by shortness of breath when: walking on the level/ up a slight hill/ at night?  6. NO - Does your chest ever sound wheezy or whistling?  7. NO - Do you currently have a cold, bronchitis or other respiratory infection?  8. NO - Have you had a cold, bronchitis or other respiratory infection within the last 2 weeks?  9. NO - Do you usually have a cough?  10. NO - Do you sometimes get pains in the calves of your legs when you walk?  11. NO - Do you or anyone in your family have previous history of blood clots?  12. NO - Do you or does anyone in your family have a serious bleeding problem such as prolonged bleeding following surgeries or cuts?  13. NO - Have you ever had problems with anemia or been told to take iron  pills?  14. NO - Have you had any abnormal blood loss such as black, tarry or bloody stools, or abnormal vaginal bleeding?  15. NO - Have you ever had a blood transfusion?  16. YES - Have you or any of your relatives ever had problems with anesthesia? Vomited after wisdom teeth anesthesia  17. NO - Do you have sleep apnea, excessive snoring or daytime drowsiness?  18. NO - Do you have any prosthetic heart valves?  19. NO - Do you have prosthetic joints?  20. NO - Is there any chance that you may be pregnant? LMP 10/20    Patient Active Problem List   Diagnosis     Strain of left knee     Raynaud's disease without gangrene     Pain of toe of left foot     Thyroid enlarged     Thyroid nodule     Past Surgical History:   Procedure Laterality Date     APPENDECTOMY  2001     TONSILLECTOMY       vitamin D3 (CHOLECALCIFEROL) 2000 units tablet, Take 1 tablet by mouth daily    No current facility-administered medications on file prior to visit.       OTC products: None, except as noted above    No Known Allergies  Latex Allergy: NO    Social History     Socioeconomic History     Marital status: Single     Spouse name: None     Number of children: None     Years of education: None     Highest education level: None   Occupational History     None   Social Needs     Financial resource strain: None     Food insecurity:     Worry: None     Inability: None     Transportation needs:     Medical: None     Non-medical: None   Tobacco Use     Smoking status: Never Smoker     Smokeless tobacco: Never Used   Substance and Sexual Activity     Alcohol use: Yes     Frequency: 2-3 times a week     Drinks per session: 1 or 2     Drug use: Never     Sexual activity: Yes     Partners: Male     Birth control/protection: Condom   Lifestyle     Physical activity:     Days per week: None     Minutes per session: None     Stress: None   Relationships     Social connections:     Talks on phone: None     Gets together: None     Attends Buddhism  "service: None     Active member of club or organization: None     Attends meetings of clubs or organizations: None     Relationship status: None     Intimate partner violence:     Fear of current or ex partner: None     Emotionally abused: None     Physically abused: None     Forced sexual activity: None   Other Topics Concern     None   Social History Narrative     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 with 1 roomate       REVIEW OF SYSTEMS:   CONSTITUTIONAL: NEGATIVE for fever, chills, change in weight  INTEGUMENTARY/SKIN: NEGATIVE for worrisome rashes, moles or lesions  EYES: NEGATIVE for vision changes or irritation  ENT/MOUTH: NEGATIVE for ear, mouth and throat problems other than neck fullness  RESP: NEGATIVE for significant cough or SOB  BREAST: NEGATIVE for masses, tenderness or discharge  CV: NEGATIVE for chest pain, palpitations or peripheral edema  GI: NEGATIVE for nausea, abdominal pain, heartburn, or change in bowel habits  : NEGATIVE for frequency, dysuria, or hematuria  MUSCULOSKELETAL: NEGATIVE for significant arthralgias or myalgia  NEURO: NEGATIVE for weakness, dizziness or paresthesias  ENDOCRINE: NEGATIVE for temperature intolerance, skin/hair changes  HEME: NEGATIVE for bleeding problems  PSYCHIATRIC: NEGATIVE for changes in mood or affect    EXAM:     Patient Vitals for the past 24 hrs:   BP Temp Temp src Pulse Resp SpO2 Height Weight   11/18/19 1055 114/77 98.4  F (36.9  C) Oral 70 16 97 % 1.702 m (5' 7\") 69 kg (152 lb 3.2 oz)     Body mass index is 23.84 kg/m .  GENERAL: healthy, alert and no distress  EYES: Eyes grossly normal to inspection, extraocular movements - intact, and PERRL  HENT: ear canals- normal; TMs- normal; Nose- normal; Mouth- no ulcers, no lesions  NECK: no tenderness; thyroid- enlarged, right side larger than left side.   RESP: lungs clear to auscultation - no rales, no rhonchi, no wheezes  CV: regular rates and rhythm, normal S1 S2, no S3 or S4 " and no murmur, no click or rub -  ABDOMEN: soft, no tenderness, no  hepatosplenomegaly, no masses, normal bowel sounds  MS: extremities- no gross deformities noted, no edema  SKIN: no suspicious lesions, no rashes  NEURO: strength and tone- normal, sensory exam- grossly normal, mentation- intact, speech- normal, reflexes- symmetric  BACK: no CVA tenderness, no paralumbar tenderness  PSYCH: Alert and oriented times 3; speech- coherent , normal rate and volume; able to articulate logical thoughts  LYMPHATICS: ant. cervical- normal, post. cervical- normal    DIAGNOSTICS:      Hemoglobin (indicated for history of anemia or procedure with significant blood loss such as tonsillectomy, major intraperitoneal surgery, vascular surgery, major spine surgery, total joint replacement)    RISK ASSESSMENT:     Cardiovascular Risk:  -Patient is able to participate in strenuous activities without chest pain.  -The patient does not have chest pain.  -Patient does not have a history of congestive heart failure.    -The patient does not have a history of stroke and does not have a history of valvular disease.    Pulmonary Risk:  -In terms of risk factors for pulmonary complication, the patient has no risk factors    Perioperative Complications:  -The patient does not have a history of bleeding or clotting problems in the past.    -The patient has not had complications from surgeries.    -The patient does not have a family history of any anesthesia or surgical complications.      IMPRESSION:   Reason for surgery/procedure: thyroid nodules    The proposed surgical procedure is considered INTERMEDIATE risk.    For above listed surgery and anesthesia:   Patient is at low risk for surgery/procedure and perioperative/procedure complications.    RECOMMENDATIONS:     Labs:    Lab Results   Component Value Date    HGB 14.2 11/18/2019     Lab Results   Component Value Date    HCT 44.3 11/18/2019     Lab Results   Component Value Date    MCV 93.7  11/18/2019     Lab Results   Component Value Date    [facility] 30.0 11/18/2019     Lab Results   Component Value Date    [facility] 32.1 11/18/2019     UPT: negative    Fasting:  Fasting instructions per surgeon.     Preop Plan:  --Approval given to proceed with proposed procedure, without further diagnostic evaluation    Medications:  Patient should take their regular medications the morning of surgery unless otherwise instructed.    Do not take ibuprofen for 5 days prior.    Please contact our office if there are any further questions or information required about this patient.      Patient also requested STI screening - all negative.     Stephanie Keith MD   of [facility], [facility]

## 2019-11-19 LAB
C TRACH DNA SPEC QL NAA+PROBE: NEGATIVE
HBV SURFACE AG SERPL QL IA: NONREACTIVE
HIV 1+2 AB+HIV1 P24 AG SERPL QL IA: NONREACTIVE
N GONORRHOEA DNA SPEC QL NAA+PROBE: NEGATIVE
SPECIMEN SOURCE: NORMAL
SPECIMEN SOURCE: NORMAL
T PALLIDUM AB SER QL: NONREACTIVE

## 2019-11-20 NOTE — RESULT ENCOUNTER NOTE
The results from your recent testing are normal.    Your STI screening tests were negative for HIV, hepatitis B, syphilis, chlamydia, and gonorrhea.      If you have any further questions feel free to contact me via Maven message.     Sincerely,   Stephanie Keith MD

## 2019-11-25 ENCOUNTER — ANESTHESIA EVENT (OUTPATIENT)
Dept: SURGERY | Facility: AMBULATORY SURGERY CENTER | Age: 29
End: 2019-11-25

## 2019-11-25 NOTE — ANESTHESIA PREPROCEDURE EVALUATION
"Anesthesia Pre-Procedure Evaluation    Patient: Yumiko Marquez   MRN:     [id number] Gender:   female   Age:    29 year old :      1990        Preoperative Diagnosis: Thyroid nodule [E04.1]   Procedure(s):  right thyroidectomy and isthmusectomy     Past Medical History:   Diagnosis Date     Complication of anesthesia      History of ITP      PONV (postoperative nausea and vomiting)       Past Surgical History:   Procedure Laterality Date     APPENDECTOMY       TONSILLECTOMY                     PHYSICAL EXAM:   Mental Status/Neuro: A/A/O   Airway: Facies: Feasible  Mallampati: I  Mouth/Opening: Full  TM distance: > 6 cm  Neck ROM: Full   Respiratory: Auscultation: CTAB     Resp. Rate: Normal     Resp. Effort: Normal      CV: Rhythm: Regular  Rate: Age appropriate  Heart: Normal Sounds  Edema: None   Comments:      Dental: Retainer  Retainer: Lower; Upper                LABS:  CBC:   Lab Results   Component Value Date    HGB 14.2 2019    HCT 44.3 2019     BMP: No results found for: NA, POTASSIUM, CHLORIDE, CO2, BUN, CR, GLC  COAGS: No results found for: PTT, INR, FIBR  POC:   Lab Results   Component Value Date    HCG NEGATIVE 2019     OTHER:   Lab Results   Component Value Date    TSH 1.46 2019    T4 0.97 2019    T3 101 2019        Preop Vitals    BP Readings from Last 3 Encounters:   19 114/77   10/24/19 101/69   08/15/19 128/79    Pulse Readings from Last 3 Encounters:   19 70   10/24/19 69   08/15/19 65      Resp Readings from Last 3 Encounters:   19 16    SpO2 Readings from Last 3 Encounters:   19 97%   10/24/19 100%   19 100%      Temp Readings from Last 1 Encounters:   19 36.9  C (98.4  F) (Oral)    Ht Readings from Last 1 Encounters:   19 1.702 m (5' 7\")      Wt Readings from Last 1 Encounters:   19 69 kg (152 lb 3.2 oz)    Estimated body mass index is 23.84 kg/m  as calculated from the following:    Height as " "of 11/18/19: 1.702 m (5' 7\").    Weight as of 11/18/19: 69 kg (152 lb 3.2 oz).     LDA:        Assessment:   ASA SCORE: 2    H&P: History and physical reviewed and following examination; no interval change.    NPO Status: NPO Appropriate     Plan:   Anes. Type:  General   Pre-Medication: None   Induction:  IV (Standard)   Airway: ETT; Oral; CMAC/VL   Access/Monitoring: PIV   Maintenance: Balanced     Postop Plan:   Postop Pain: Opioids  Postop Sedation/Airway: Not planned  Disposition: Outpatient     PONV Management:   Adult Risk Factors: Female, H/o PONV or Motion Sickness, Postop Opioids   Prevention: Ondansetron, Dexamethasone     CONSENT: Direct conversation   Plan and risks discussed with: Patient          Comments for Plan/Consent:  NIM  CMAC/Glidescope  Remifentanil gtt                 Quintin Franco MD     ANESTHESIA PREOP EVALUATION    PROCEDURE: Procedure(s):  right thyroidectomy and isthmusectomy    HPI: Yumiko Marquez is a 29 year old female who presents for above procedure 2/2 thyroid nodule.     PAST MEDICAL HISTORY:    Past Medical History:   Diagnosis Date     Complication of anesthesia      History of ITP 1995     PONV (postoperative nausea and vomiting)        PAST SURGICAL HISTORY:    Past Surgical History:   Procedure Laterality Date     APPENDECTOMY  2001     TONSILLECTOMY         SOCIAL HISTORY:       Social History     Tobacco Use     Smoking status: Never Smoker     Smokeless tobacco: Never Used   Substance Use Topics     Alcohol use: Yes     Frequency: 2-3 times a week     Drinks per session: 1 or 2       ALLERGIES:     No Known Allergies    MEDICATIONS:     (Not in a hospital admission)      Current Outpatient Medications   Medication Sig Dispense Refill     vitamin D3 (CHOLECALCIFEROL) 2000 units tablet Take 1 tablet by mouth daily         Current Outpatient Medications Ordered in Epic   Medication Sig Dispense Refill     vitamin D3 (CHOLECALCIFEROL) 2000 units tablet Take 1 tablet by " mouth daily       No current [facility]-ordered facility-administered medications on file.        PHYSICAL EXAM:    Vitals: T Data Unavailable, P Data Unavailable, BP Data Unavailable, R Data Unavailable, SpO2  , Weight   Wt Readings from Last 2 Encounters:   11/18/19 69 kg (152 lb 3.2 oz)   10/24/19 71.1 kg (156 lb 12.8 oz)       See doc flowsheet    NPO STATUS: see doc flowsheet    LABS:    BMP:  No results for input(s): NA, POTASSIUM, CHLORIDE, CO2, BUN, CR, GLC, AMELIA in the last 77933 hours.    LFTs:   No results for input(s): PROTTOTAL, ALBUMIN, BILITOTAL, ALKPHOS, AST, ALT, BILIDIRECT in the last 44591 hours.    CBC:   Recent Labs   Lab Test 11/18/19  1144   HGB 14.2   HCT 44.3   MCV 93.7   MCH 30.0   MCHC 32.1       Coags:  No results for input(s): INR, PTT, FIBR in the last 05453 hours.    Imaging:  No orders to display       Quintin Franco MD  Anesthesiology Staff  Pager [phone]    11/25/2019  4:39 PM

## 2019-11-26 ENCOUNTER — ANESTHESIA (OUTPATIENT)
Dept: SURGERY | Facility: AMBULATORY SURGERY CENTER | Age: 29
End: 2019-11-26

## 2019-11-26 ENCOUNTER — HOSPITAL ENCOUNTER (OUTPATIENT)
Facility: AMBULATORY SURGERY CENTER | Age: 29
End: 2019-11-26
Attending: SURGERY
Payer: COMMERCIAL

## 2019-11-26 VITALS
HEIGHT: 67 IN | BODY MASS INDEX: 23.88 KG/M2 | TEMPERATURE: 98.6 F | HEART RATE: 56 BPM | DIASTOLIC BLOOD PRESSURE: 82 MMHG | OXYGEN SATURATION: 99 % | RESPIRATION RATE: 16 BRPM | WEIGHT: 152.12 LBS | SYSTOLIC BLOOD PRESSURE: 123 MMHG

## 2019-11-26 DIAGNOSIS — E04.1 THYROID NODULE: ICD-10-CM

## 2019-11-26 DIAGNOSIS — E04.1 THYROID NODULE: Primary | ICD-10-CM

## 2019-11-26 LAB
HCG UR QL: NEGATIVE
INTERNAL QC OK POCT: YES

## 2019-11-26 RX ORDER — OXYCODONE HYDROCHLORIDE 5 MG/1
5 TABLET ORAL
Status: COMPLETED | OUTPATIENT
Start: 2019-11-26 | End: 2019-11-26

## 2019-11-26 RX ORDER — ACETAMINOPHEN 325 MG/1
650 TABLET ORAL EVERY 4 HOURS PRN
Qty: 50 TABLET | Refills: 0 | Status: SHIPPED | OUTPATIENT
Start: 2019-11-26 | End: 2021-02-25

## 2019-11-26 RX ORDER — AMOXICILLIN 250 MG
1-2 CAPSULE ORAL 2 TIMES DAILY
Qty: 30 TABLET | Refills: 0 | Status: SHIPPED | OUTPATIENT
Start: 2019-11-26 | End: 2019-12-16

## 2019-11-26 RX ORDER — DEXAMETHASONE SODIUM PHOSPHATE 4 MG/ML
INJECTION, SOLUTION INTRA-ARTICULAR; INTRALESIONAL; INTRAMUSCULAR; INTRAVENOUS; SOFT TISSUE PRN
Status: DISCONTINUED | OUTPATIENT
Start: 2019-11-26 | End: 2019-11-26

## 2019-11-26 RX ORDER — IBUPROFEN 600 MG/1
600 TABLET, FILM COATED ORAL EVERY 6 HOURS PRN
Qty: 30 TABLET | Refills: 0 | Status: SHIPPED | OUTPATIENT
Start: 2019-11-26 | End: 2021-02-25

## 2019-11-26 RX ORDER — PROPOFOL 10 MG/ML
INJECTION, EMULSION INTRAVENOUS CONTINUOUS PRN
Status: DISCONTINUED | OUTPATIENT
Start: 2019-11-26 | End: 2019-11-26

## 2019-11-26 RX ORDER — NALOXONE HYDROCHLORIDE 0.4 MG/ML
.1-.4 INJECTION, SOLUTION INTRAMUSCULAR; INTRAVENOUS; SUBCUTANEOUS
Status: DISCONTINUED | OUTPATIENT
Start: 2019-11-26 | End: 2019-11-27 | Stop reason: HOSPADM

## 2019-11-26 RX ORDER — FENTANYL CITRATE 50 UG/ML
INJECTION, SOLUTION INTRAMUSCULAR; INTRAVENOUS PRN
Status: DISCONTINUED | OUTPATIENT
Start: 2019-11-26 | End: 2019-11-26

## 2019-11-26 RX ORDER — LIDOCAINE 40 MG/G
CREAM TOPICAL
Status: DISCONTINUED | OUTPATIENT
Start: 2019-11-26 | End: 2019-11-26 | Stop reason: HOSPADM

## 2019-11-26 RX ORDER — ACETAMINOPHEN 325 MG/1
975 TABLET ORAL ONCE
Status: COMPLETED | OUTPATIENT
Start: 2019-11-26 | End: 2019-11-26

## 2019-11-26 RX ORDER — SODIUM CHLORIDE, SODIUM LACTATE, POTASSIUM CHLORIDE, CALCIUM CHLORIDE 600; 310; 30; 20 MG/100ML; MG/100ML; MG/100ML; MG/100ML
INJECTION, SOLUTION INTRAVENOUS CONTINUOUS
Status: DISCONTINUED | OUTPATIENT
Start: 2019-11-26 | End: 2019-11-26 | Stop reason: HOSPADM

## 2019-11-26 RX ORDER — ONDANSETRON 2 MG/ML
4 INJECTION INTRAMUSCULAR; INTRAVENOUS EVERY 30 MIN PRN
Status: DISCONTINUED | OUTPATIENT
Start: 2019-11-26 | End: 2019-11-27 | Stop reason: HOSPADM

## 2019-11-26 RX ORDER — PROPOFOL 10 MG/ML
INJECTION, EMULSION INTRAVENOUS PRN
Status: DISCONTINUED | OUTPATIENT
Start: 2019-11-26 | End: 2019-11-26

## 2019-11-26 RX ORDER — GABAPENTIN 300 MG/1
300 CAPSULE ORAL ONCE
Status: COMPLETED | OUTPATIENT
Start: 2019-11-26 | End: 2019-11-26

## 2019-11-26 RX ORDER — ONDANSETRON 2 MG/ML
INJECTION INTRAMUSCULAR; INTRAVENOUS PRN
Status: DISCONTINUED | OUTPATIENT
Start: 2019-11-26 | End: 2019-11-26

## 2019-11-26 RX ORDER — ACETAMINOPHEN 325 MG/1
650 TABLET ORAL
Status: DISCONTINUED | OUTPATIENT
Start: 2019-11-26 | End: 2019-11-27 | Stop reason: HOSPADM

## 2019-11-26 RX ORDER — DEXAMETHASONE SODIUM PHOSPHATE 10 MG/ML
10 INJECTION, SOLUTION INTRAMUSCULAR; INTRAVENOUS ONCE
Status: DISCONTINUED | OUTPATIENT
Start: 2019-11-26 | End: 2019-11-26 | Stop reason: HOSPADM

## 2019-11-26 RX ORDER — OXYCODONE HYDROCHLORIDE 5 MG/1
5 TABLET ORAL EVERY 6 HOURS PRN
Qty: 20 TABLET | Refills: 0 | Status: SHIPPED | OUTPATIENT
Start: 2019-11-26 | End: 2019-12-16

## 2019-11-26 RX ORDER — GLYCOPYRROLATE 0.2 MG/ML
INJECTION, SOLUTION INTRAMUSCULAR; INTRAVENOUS PRN
Status: DISCONTINUED | OUTPATIENT
Start: 2019-11-26 | End: 2019-11-26

## 2019-11-26 RX ORDER — ONDANSETRON 4 MG/1
4 TABLET, ORALLY DISINTEGRATING ORAL EVERY 30 MIN PRN
Status: DISCONTINUED | OUTPATIENT
Start: 2019-11-26 | End: 2019-11-27 | Stop reason: HOSPADM

## 2019-11-26 RX ORDER — LIDOCAINE HYDROCHLORIDE 20 MG/ML
INJECTION, SOLUTION INFILTRATION; PERINEURAL PRN
Status: DISCONTINUED | OUTPATIENT
Start: 2019-11-26 | End: 2019-11-26

## 2019-11-26 RX ORDER — MEPERIDINE HYDROCHLORIDE 25 MG/ML
12.5 INJECTION INTRAMUSCULAR; INTRAVENOUS; SUBCUTANEOUS
Status: DISCONTINUED | OUTPATIENT
Start: 2019-11-26 | End: 2019-11-27 | Stop reason: HOSPADM

## 2019-11-26 RX ORDER — SODIUM CHLORIDE, SODIUM LACTATE, POTASSIUM CHLORIDE, CALCIUM CHLORIDE 600; 310; 30; 20 MG/100ML; MG/100ML; MG/100ML; MG/100ML
INJECTION, SOLUTION INTRAVENOUS CONTINUOUS
Status: DISCONTINUED | OUTPATIENT
Start: 2019-11-26 | End: 2019-11-27 | Stop reason: HOSPADM

## 2019-11-26 RX ORDER — FENTANYL CITRATE 50 UG/ML
25-50 INJECTION, SOLUTION INTRAMUSCULAR; INTRAVENOUS
Status: DISCONTINUED | OUTPATIENT
Start: 2019-11-26 | End: 2019-11-27 | Stop reason: HOSPADM

## 2019-11-26 RX ADMIN — FENTANYL CITRATE 25 MCG: 50 INJECTION, SOLUTION INTRAMUSCULAR; INTRAVENOUS at 11:50

## 2019-11-26 RX ADMIN — GABAPENTIN 300 MG: 300 CAPSULE ORAL at 09:22

## 2019-11-26 RX ADMIN — LIDOCAINE HYDROCHLORIDE 60 MG: 20 INJECTION, SOLUTION INFILTRATION; PERINEURAL at 10:17

## 2019-11-26 RX ADMIN — PROPOFOL 200 MCG/KG/MIN: 10 INJECTION, EMULSION INTRAVENOUS at 10:17

## 2019-11-26 RX ADMIN — GLYCOPYRROLATE 0.2 MG: 0.2 INJECTION, SOLUTION INTRAMUSCULAR; INTRAVENOUS at 10:09

## 2019-11-26 RX ADMIN — Medication 0.25 MG: at 10:49

## 2019-11-26 RX ADMIN — DEXAMETHASONE SODIUM PHOSPHATE 10 MG: 4 INJECTION, SOLUTION INTRA-ARTICULAR; INTRALESIONAL; INTRAMUSCULAR; INTRAVENOUS; SOFT TISSUE at 10:24

## 2019-11-26 RX ADMIN — ONDANSETRON 4 MG: 2 INJECTION INTRAMUSCULAR; INTRAVENOUS at 11:14

## 2019-11-26 RX ADMIN — ACETAMINOPHEN 975 MG: 325 TABLET ORAL at 09:22

## 2019-11-26 RX ADMIN — PROPOFOL 200 MG: 10 INJECTION, EMULSION INTRAVENOUS at 10:17

## 2019-11-26 RX ADMIN — FENTANYL CITRATE 50 MCG: 50 INJECTION, SOLUTION INTRAMUSCULAR; INTRAVENOUS at 10:09

## 2019-11-26 RX ADMIN — FENTANYL CITRATE 50 MCG: 50 INJECTION, SOLUTION INTRAMUSCULAR; INTRAVENOUS at 10:17

## 2019-11-26 RX ADMIN — FENTANYL CITRATE 25 MCG: 50 INJECTION, SOLUTION INTRAMUSCULAR; INTRAVENOUS at 11:43

## 2019-11-26 RX ADMIN — Medication 0.25 MG: at 10:44

## 2019-11-26 RX ADMIN — Medication 30 MG: at 10:18

## 2019-11-26 RX ADMIN — SODIUM CHLORIDE, SODIUM LACTATE, POTASSIUM CHLORIDE, CALCIUM CHLORIDE: 600; 310; 30; 20 INJECTION, SOLUTION INTRAVENOUS at 09:23

## 2019-11-26 RX ADMIN — OXYCODONE HYDROCHLORIDE 5 MG: 5 TABLET ORAL at 11:43

## 2019-11-26 RX ADMIN — SODIUM CHLORIDE, SODIUM LACTATE, POTASSIUM CHLORIDE, CALCIUM CHLORIDE: 600; 310; 30; 20 INJECTION, SOLUTION INTRAVENOUS at 10:07

## 2019-11-26 RX ADMIN — PROPOFOL 70 MG: 10 INJECTION, EMULSION INTRAVENOUS at 10:40

## 2019-11-26 ASSESSMENT — MIFFLIN-ST. JEOR
SCORE: 1442.75
SCORE: 1442.75

## 2019-11-26 NOTE — ANESTHESIA POSTPROCEDURE EVALUATION
Anesthesia POST Procedure Evaluation    Patient: Yumiko Marquez   MRN:     [id number] Gender:   female   Age:    29 year old :      1990        Preoperative Diagnosis: Thyroid nodule [E04.1]   Procedure(s):  Right Thyroidectomy and Isthmusectomy   Postop Comments: No value filed.       Anesthesia Type:  Not documented  General    Reportable Event: NO     PAIN: Uncomplicated   Sign Out status: Comfortable, Well controlled pain     PONV: No PONV   Sign Out status:  No Nausea or Vomiting     Neuro/Psych: Uneventful perioperative course   Sign Out Status: Preoperative baseline; Age appropriate mentation     Airway/Resp.: Uneventful perioperative course   Sign Out Status: Non labored breathing, age appropriate RR; Resp. Status within EXPECTED Parameters     CV: Uneventful perioperative course   Sign Out status: Appropriate BP and perfusion indices; Appropriate HR/Rhythm     Disposition:   Sign Out in:  PACU  Disposition:  Phase II; Home  Recovery Course: Uneventful  Follow-Up: Not required           Last Anesthesia Record Vitals:  CRNA VITALS  2019 1100 - 2019 1200      2019             Pulse:  81    SpO2:  100 %    Resp Rate (observed):  12          Last PACU Vitals:  Vitals Value Taken Time   /88 2019 11:45 AM   Temp 37.1  C (98.8  F) 2019 12:00 PM   Pulse     Resp 14 2019 12:00 PM   SpO2 95 % 2019 12:00 PM   Temp src     NIBP     Pulse     SpO2     Resp     Temp     Ht Rate     Temp 2           Electronically Signed By: Quintin Franco MD, 2019, 12:11 PM

## 2019-11-26 NOTE — ANESTHESIA CARE TRANSFER NOTE
Patient: Yumiko Marquez    Procedure(s):  Right Thyroidectomy and Isthmusectomy    Diagnosis: Thyroid nodule [E04.1]  Diagnosis Additional Information: No value filed.    Anesthesia Type:   General     Note:  Airway :Face Mask  Patient transferred to:PACU  Comments: Arrive PACU, Stable, Airway Intact  119/84, 101,12,100%,99.6  All questions answered.Handoff Report: Identifed the Patient, Identified the Reponsible Provider, Reviewed the pertinent medical history, Discussed the surgical course, Reviewed Intra-OP anesthesia mangement and issues during anesthesia, Set expectations for post-procedure period and Allowed opportunity for questions and acknowledgement of understanding      Vitals: (Last set prior to Anesthesia Care Transfer)    CRNA VITALS  11/26/2019 1100 - 11/26/2019 1134      11/26/2019             Pulse:  81    SpO2:  100 %    Resp Rate (observed):  12                Electronically Signed By: SAMANTHA Bosch CRNA  November 26, 2019  11:34 AM

## 2019-11-26 NOTE — BRIEF OP NOTE
[facility]    Brief Operative Note    Pre-operative diagnosis: Thyroid nodule [E04.1]  Post-operative diagnosis Same as pre-operative diagnosis    Procedure: Procedure(s):  Right Thyroidectomy and Isthmusectomy  Surgeon: Surgeon(s) and Role:     * Oilvia Camargo MD - Primary  Anesthesia: General   Estimated blood loss: Less than 10 ml  Drains: None  Specimens:   ID Type Source Tests Collected by Time Destination   A : Right Thyroid Lobe and Isthmus Tissue Thyroid, Right SURGICAL PATHOLOGY EXAM Olivia Camargo MD 11/26/2019 11:11 AM      Findings:   Right thyroid lobectomy and isthusmectomy..  Complications: None.  Implants: * No implants in log *

## 2019-11-26 NOTE — DISCHARGE INSTRUCTIONS
[facility]  Home Care Following Anesthesia  For 24 hours after surgery:  1. Get plenty of rest.  A responsible adult must stay with you for at least 24 hours after you leave the surgery center.  2. Do not drive or use heavy equipment.  If you have weakness or tingling, don't drive or use heavy equipment until this feeling goes away.   3. Do not drink alcohol.   4. Avoid strenuous or risky activities.  Ask for help when climbing stairs.  5. You may feel lightheaded.  IF so, sit for a few minutes before standing.  Have someone help you get up.   6. If you have nausea (feel sick to your stomach): Drink only clear liquids such as apple juice, ginger ale, broth or 7-Up.  Rest may also help.  Be sure to drink enough fluids.  Move to a regular diet as you feel able.   7. You may have a slight fever.  Call the doctor if your fever is over 100 F (37.7 C) (taken under the tongue) or lasts longer than 24 hours.  8. You may have a dry mouth, a sore throat, muscle aches or trouble sleeping. These should go away after 24 hours.  9. Do not make important or legal decisions.   If you use hormonal birth control (such as the pill, patch, ring or implants):  You will need a second form of birth control for 7 days (condoms, a diaphragm or contraceptive foam).  While in the surgery center, you received a medicine called Sugammadex.  Hormonal birth control (such as the pill, patch, ring or implants) will not work as well for a week after taking this medicine.    Tips for taking pain medications  To get the best pain relief possible, remember these points:    Take pain medications as directed, before pain becomes severe.    Pain medication can upset your stomach: taking it with food may help.    Constipation is a common side effect of pain medication. Drink plenty of  fluids.    Eat foods high in fiber. Take a stool softener if recommended by your doctor or pharmacist.    Do not drink alcohol, drive  or operate machinery while taking pain medications.    Ask about other ways to control pain, such as with heat, ice or relaxation.    Tylenol/Acetaminophen Consumption: 975 mg tylenol taken at 09:22AM, OK to take additional tylenol after 3:22PM. Follow package instructions.  To help encourage the safe use of acetaminophen, the makers of TYLENOL  have lowered the maximum daily dose for single-ingredient Extra Strength TYLENOL  (acetaminophen) products sold in the [place] from 8 pills per day (4,000 mg) to 6 pills per day (3,000 mg). The dosing interval has also changed from 2 pills every 4-6 hours to 2 pills every 6 hours.    If you feel your pain relief is insufficient, you may take Tylenol/Acetaminophen in addition to your narcotic pain medication.     Be careful not to exceed 3,000 mg of Tylenol/Acetaminophen in a 24 hour period from all sources.    If you are taking extra strength Tylenol/acetaminophen (500 mg), the maximum dose is 6 tablets in 24 hours.    If you are taking regular strength acetaminophen (325 mg), the maximum dose is 9 tablets in 24 hours.    Call a doctor for any of the followin. Signs of infection (fever, growing tenderness at the surgery site, a large amount of drainage or bleeding, severe pain, foul-smelling drainage, redness, swelling).  2. It has been over 8 to 10 hours since surgery and you are still not able to urinate (pass water).  3. Headache for over 24 hours.  Your doctor is:  Dr. Olivia Camargo, ENT Otolaryngology: [phone]           Or dial [phone] and ask for the resident on call for:  ENT Otolaryngology  For emergency care, call the:  [place] [facility]:  [phone] (TTY for hearing impaired: [phone])

## 2019-11-27 NOTE — OP NOTE
OPERATIVE REPORT    DATE OF PROCEDURE: 11/26/2019    SURGEON: Olivia Camargo MD    RESIDENT: Som Strange MD    PROCEDURE:  1. Right thyroid lobectomy and isthmusectomy    PRE-OP DIAGNOSIS:  1. Right thyroid nodule    POST-OP DIAGNOSIS: Same    INDICATIONS FOR PROCEDURE:  Yumiko is a 28yo female with history of ITP in 1995 who presented for evaluation of a right thyroid nodule. Her sister originally noticed a large mass on her right neck during dinner. An ultrasound demonstrated multiple nodules in the right thyroid lobe, the largest was 3.7cm. 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 Given the FNA results, it was recommended that they undergo a right thyroid lobectomy and isthmusectomy in the operating room. After a discussion of the risks and benefits, the patient decided to proceed to the OR.    FINDINGS:  Large right thyroid nodule. Right thyroid lobe and isthmus removed. Superior and inferior right parathyroid glands were identified and preserved. The right recurrent laryngeal nerve stimulated at 1mA at the end of the case.    SPECIMENS:   1. Right thyroid lobe and isthmus    PROCEDURE:  The patient was brought to the operating room and transferred to the operating table in the supine position. The patient was then induced and intubated by Anesthesiology using a NIM tube. The ground electrodes were placed and nerve integrity monitoring system was set-up. A shoulder roll was placed and the patient was prepped and draped in the usual fashion.    A full time out was held and a 4cm incision was made within a neck crease overlying the thyroid isthmus. The incision was carried just deep to the platysma using electrocautery. Limited superior and inferior subplatysmal flap were developed using electrocautery. The straps were divided in the midline. The thyroid was identified and its overlying soft tissue was bluntly removed.     The right superior pole was bluntly mobilized. The superior  pole vessels were identified and ligated as they entered the thyroid gland. The suprerior parathyroid gland was identified and preserved. The lateral thyroid was then bluntly mobilized and in the process of doing so, the recurrent laryngeal nerve was identified. The inferior thyroid pole was then bluntly mobilized, taking care to preserve the nerve. The inferior parathyroid gland and vessels were identified. The parathyroid was  from the thyroid gland the vessels were ligated as they entered the gland to preserve blood supply to the parathyroid glands. Mobilization of the thyroid gland proceeded medially using a combination of bipolar and ligasure, taking care to preserve the nerve. Once the thyroid was mobilized far enough away from the nerve, Bal's ligament was divided using electrocautery. The isthmus was then divided using the ligasure.    The wound was irrigated and meticulous hemostasis was obtained. The right recurrent laryngeal nerve was stimulated at 1mA. Fibrillar was placed in the wound and the strap muscles were reapproximated using 4-0 chromic. The platysma was reapproximated using buried 4-0 chromic sutures. Two deep dermal sutures were placed with 4-0 chromic. The skin was closed with a 5-0 monocryl subcuticular suture and dermabond. The patient was handed over to Anesthesiology in good condition for recovery.    EBL: 5cc    COMPLICATIONS: None    IMPLANTS: None    Dr. Camargo was present for and participated in all portions of the procedure    Som Strange MD  Otolaryngology PGY-3    I was present for the entire surgical procedure, performed the critical aspects of the surgery and agree with the above note.   Olivia Camargo MD

## 2019-12-03 LAB — COPATH REPORT: NORMAL

## 2019-12-16 ENCOUNTER — MYC MEDICAL ADVICE (OUTPATIENT)
Dept: OTOLARYNGOLOGY | Facility: CLINIC | Age: 29
End: 2019-12-16

## 2019-12-16 ENCOUNTER — OFFICE VISIT (OUTPATIENT)
Dept: OTOLARYNGOLOGY | Facility: CLINIC | Age: 29
End: 2019-12-16
Payer: COMMERCIAL

## 2019-12-16 VITALS
HEIGHT: 67 IN | SYSTOLIC BLOOD PRESSURE: 113 MMHG | WEIGHT: 150 LBS | HEART RATE: 68 BPM | DIASTOLIC BLOOD PRESSURE: 76 MMHG | BODY MASS INDEX: 23.54 KG/M2

## 2019-12-16 DIAGNOSIS — E89.0 S/P PARTIAL THYROIDECTOMY: Primary | ICD-10-CM

## 2019-12-16 ASSESSMENT — PAIN SCALES - GENERAL: PAINLEVEL: NO PAIN (0)

## 2019-12-16 ASSESSMENT — MIFFLIN-ST. JEOR: SCORE: 1438.15

## 2019-12-16 NOTE — NURSING NOTE
"Chief Complaint   Patient presents with     RECHECK     post op      Blood pressure 113/76, pulse 68, height 1.71 m (5' 7.32\"), weight 68 kg (150 lb), last menstrual period 11/26/2019.    Jero Cadena LPN    "

## 2019-12-16 NOTE — LETTER
12/16/2019       RE: Yumiko Marquez  [address]  [place] [facility] [address]     Dear Colleague,    Thank you for referring your patient, Yumiko Marquez, to the [facility] at [facility]. Please see a copy of my visit note below.    Service Date: 12/16/2019      HISTORY OF PRESENT ILLNESS:  This is a 29-year-old female who underwent a right thyroid lobectomy and isthmusectomy on 11/26/2019.  She presents today for a postoperative visit.  Since the surgery, the patient has had no problems with voice quality, inspiration or swallowing.  She has had no symptoms of hypo or hyperthyroidism.  No symptoms of hypocalcemia.      PHYSICAL EXAMINATION:  The wound is healing well.  The Dermabond was removed and the sutures were clipped at the skin edges.  There is no evidence of hematoma, seroma or infection.      PATHOLOGY:  I had a lengthy discussion with this patient regarding her pathology report.  This is consistent with a 4.2 cm papillary thyroid carcinoma.  Margins are uninvolved, lymphatic invasion is not identified, perineural invasion is not identified and extrathyroidal extension is not identified.  This is considered a T3aNX papillary thyroid carcinoma.      PLAN:  Because of the size, particularly since it is greater than 4 cm, I recommended strongly to the patient that she have a completion thyroidectomy.  She had multiple questions as to why this happened.  I explained to her that she did not do anything to cause this to happen.  She is also traveling to [facility] for 2 weeks in January.  I think that is reasonable and then I instructed her to come back for completion thyroidectomy.  She is scheduled to see an endocrinologist and I suspect the Endocrinology team will concur with my recommendations.  Again, I recommended a completion thyroidectomy.  The procedure and risks were discussed with the patient.  I would like to get her scheduled for surgery, but  she wanted to wait until after she got back from [facility].         JIGNESH POND MD             D: 2020   T: 01/15/2020   MT: marcelino      Name:     CYNTHIA ALEJANDRO   MRN:      [id number]        Account:      [id number]   :      1990           Service Date: 2019      Document: [id number]       Again, thank you for allowing me to participate in the care of your patient.      Sincerely,    Jignesh Pond MD

## 2019-12-17 ENCOUNTER — MYC MEDICAL ADVICE (OUTPATIENT)
Dept: OTOLARYNGOLOGY | Facility: CLINIC | Age: 29
End: 2019-12-17

## 2019-12-19 ENCOUNTER — MYC MEDICAL ADVICE (OUTPATIENT)
Dept: OTOLARYNGOLOGY | Facility: CLINIC | Age: 29
End: 2019-12-19

## 2020-01-02 DIAGNOSIS — E89.0 S/P PARTIAL THYROIDECTOMY: ICD-10-CM

## 2020-01-02 DIAGNOSIS — Z01.89 LABORATORY PROCEDURE: Primary | ICD-10-CM

## 2020-01-02 LAB — TSH SERPL DL<=0.005 MIU/L-ACNC: 2.19 MU/L (ref 0.4–4)

## 2020-01-02 PROCEDURE — 84443 ASSAY THYROID STIM HORMONE: CPT | Performed by: SURGERY

## 2020-01-02 NOTE — PROGRESS NOTES
Patient was drawn for outside orders, sent to [facility].    DALE Barnett 9:46 AM January 2, 2020

## 2020-01-02 NOTE — PROGRESS NOTES
Endocrinology Note         Yumiko is a 29 year old female presents today for papillary thyroid cancer status post right thyroidectomy.    HPI  Yumiko is a 29 year old female with history of ITP in 1995 presents today for papillary thyroid cancer status post right thyroidectomy. She is accompanied with her mother and sister. She was previously seen by Dr. Floyd.    She was noted to have mass in the right neck about 6 months ago. An ultrasound thyroid demonstrated multiple nodules in the right thyroid lobe, the largest was 3.7cm. 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  She underwent right thyroidectomy and isthmusectomy by Dr. Camargo on 11/26/2019.  Surgical pathology showed 4.3 cm papillary thyroid cancer, unifocal with clear margin.  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 The papillary thyroid cancer was well-circumscribed and encapsulated with predominantly follicular growth pattern.  However the multifocal growth of tall cells and a oncocytic changes and mitosis is focally increased up to 5 per 10 high-power fields.  The findings are consistent with focal poorly differentiated thyroid carcinoma of 10% however there was no capsular or angiolymphatic invasion.  Therefore this tumor is most likely not behave in an aggressive fashion.  No lymph node was removed.  Tumor staging pT3aNx.     She has been doing well after surgery.  She has not had any perioral tingling, muscle cramp or numbness. Her TSH on 1/2/2020 was 2.19.  She is very concerning about the next step of treatment.     Past Medical History  Past Medical History:   Diagnosis Date     Complication of anesthesia      History of ITP 1995     PONV (postoperative nausea and vomiting)        Allergies  No Known Allergies  Medications  Current Outpatient Medications   Medication Sig Dispense Refill     acetaminophen (TYLENOL) 325 MG tablet Take 2 tablets (650 mg) by mouth every 4 hours as needed for mild  "pain 50 tablet 0     ibuprofen (ADVIL/MOTRIN) 600 MG tablet Take 1 tablet (600 mg) by mouth every 6 hours as needed for other (mild and/or inflammatory pain) 30 tablet 0     vitamin D3 (CHOLECALCIFEROL) 2000 units tablet Take 1 tablet by mouth daily       Family History  family history includes Heart Disease in her maternal grandfather; Melanoma (age of onset: 60) in her maternal grandmother; Thyroid Disease in her paternal aunt; Thyroid Disease (age of onset: 45) in her maternal grandfather; Valvular heart disease in her maternal grandfather.   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    [employer]  Constitutional: no weight change, good energy  Eyes: no vision change, diplopia or red eyes   Neck: no difficulty swallowing, no choking, no neck pain, no neck swelling  Cardiovascular: no chest pain, palpitations  Respiratory: no dyspnea, cough, shortness of breath or wheezing   GI: no nausea, vomiting, diarrhea or constipation, no abdominal pain   : no change in urine, no dysuria or hematuria  Musculoskeletal: no joint or muscle pain or swelling   Integumentary: no concerning lesions or moles   Neuro: no loss of strength or sensation, no numbness or tingling, no tremor, no dizziness, no headache   Endo: no polyuria or polydipsia, no temperature intolerance   Heme/Lymph: no concerning bumps, no bleeding problems   Allergy: no environmental allergies   Psych:+anxious, no sleep problems.    Physical Exam  /78   Pulse 80   Ht 1.702 m (5' 7\")   Wt 71.4 kg (157 lb 6.4 oz)   BMI 24.65 kg/m    Body mass index is 24.65 kg/m .  Constitutional: no distress, comfortable, pleasant "   Eyes: anicteric, normal extra-ocular movements, no lid lag or retraction  Neck: no thyromegaly, no discrete nodule  Cardiovascular: regular rate and rhythm, normal S1 and S2, no murmurs  Respiratory: clear to auscultation, no wheezes or crackles, normal breath sounds   Gastrointestinal:  nontender, no hepatosmegaly, no masses   Musculoskeletal: no edema   Skin: no concerning lesions, no jaundice   Neurological: cranial nerves intact, normal gait, no tremor on outstretched hands bilaterally  Psychological: appropriate mood   Lymphatic: no cervical  lymphadenopathy.      RESULTS  I have personally reviewed labs and images. I also reviewed labs with patient and discussed the result and plan of care.    FNA 10/4/2019  A.  Thyroid, right #1, ultrasound guided fine needle aspiration:   - Suspicious for follicular neoplasm, Hï  rthle cell (oncocytic type).     The [place] implied risk of malignancy and recommended clinical management:   Suspicious for a follicular neoplasm has a 25-40% risk of malignancy, recommended management is surgical lobectomy or molecular testing.     Specimen Adequacy: Satisfactory for evaluation.     B.  Thyroid, right #2, ultrasound guided fine needle aspiration:   - Suspicious for follicular neoplasm, Hï  rthle cell (oncocytic type).     The [place] implied risk of malignancy and recommended clinical management:   Suspicious for a follicular neoplasm has a 25-40% risk of malignancy, recommended management is surgical lobectomy or molecular testing.     Surgical pathology 11/26/2019  SPECIMEN(S):   Thyroid, right lobe and isthmus     FINAL DIAGNOSIS:   Thyroid, right lobe and isthmus, hemithyroidectomy:   - Papillary thyroid carcinoma, mixed types (see comment)        - Tumor focality: Unifocal        - Tumor laterality: Right lobe        - Largest tumor size: 4.2 cm        - Margins: Uninvolved by carcinoma        - Lymphatic invasion: Not identified        - Perineural invasion: Not  identified        - Extrathyroidal extension: Not identified   - Follicular adenoma (1.6 cm)     COMMENT:   Sections reveal that the tumor is well circumscribed and encapsulated with  a predominantly follicular growth pattern.  There are multifocal papillary growths with tall cells and oncocytic changes.  There are also solid areas.  No definite necrosis is identified.  Mitosis is focally increased up to five per 10 high-power fields.  These findings are consistent with focal poorly differentiated thyroid carcinoma of approximately 10%. However, no capsular or angiolymphatic invasion is identified.  Therefore,  this tumor will most likely not behave in an aggressive fashion.     Immunostains with appropriate controls were performed.  Beta-Catenin highlights cell membrane without apparent   nuclear positivity.  Ki-67 labeling index varies from 5-10%.  No expression of HBME1 and CDX2 is identified in   the tumor cells.  These findings support the above diagnosis.     This case received intradepartmental consultation.     Report Name: Thyroid Gland - Resection        Status: Submitted Checklist Inst: 1      Last Updated By: Mago Sadler M.D., PhD, [facility], 12/03/2019 18:11:53     Part(s) Involved:   A: Thyroid, right lobe and isthmus   Synoptic Report:     CLINICAL     Clinical History:         - No known radiation exposure     SPECIMEN     Procedure:         - Right lobectomy with isthmusectomy (hemithyroidectomy)     TUMOR     Tumor Focality:         - Unifocal     Multiple Tumors of the Same Lineage       Tumors of the Same Cellular Lineage         Tumor Site:             - Right lobe         Histologic Type:             - Papillary carcinoma variant - mixed types with tall cells, oncocytic and solid variants         Tumor Size: 4.2 cm         Extrathyroidal Extension:             - Not identified         Angioinvasion (vascular invasion):             - Not identified         Lymphatic Invasion:             - Not  identified         Margins:             - Uninvolved by carcinoma     LYMPH NODES     Regional Lymph Nodes:         - No lymph nodes submitted or found     PATHOLOGIC STAGE CLASSIFICATION (PTNM, AJCC 8TH EDITION)  Primary Tumor (pT):  - pT3a     Regional Lymph Nodes (pN): - pNX     ASSESSMENT:    Yumiko is a 29 year old female with history of ITP in 1995 presents today for papillary thyroid cancer status post right thyroidectomy and isthmusectomy    1) papillary thyroid cancer status post right thyroidectomy and isthmusectomy in November 2019: Neck mass was noted 6 months ago.  FNA of the right neck mass showed suspicious for follicular neoplasm, Hurthle cell.  Surgical pathology showed 4 x 2 cm papillary thyroid cancer mixed types with tall cells, oncocytic and solid variants without extrathyroidal extension or angioinvasion.  No lymph node was removed.    -I have discussed with the patient regarding the pathology report, next steps for treatment that includes completion thyroidectomy and I-131 treatments although she has no nodules on the left thyroid lobe.  I have also discussed side effects of I-131 treatment. I also discussed thyroid suppression therapy.    -I will get ultrasound of the lateral neck to look at the lymph node today.  -I prefer to referher back to  for completion thyroidectomy. However, she is reluctant due to multiple factors. I will discuss with  today.    PLAN:   -Ultrasound lateral neck and thyroid today  -Return to clinic in 3 months    Elder Castelan MD  Division of Diabetes and Endocrinology  Department of Medicine  [phone]

## 2020-01-03 ENCOUNTER — OFFICE VISIT (OUTPATIENT)
Dept: ENDOCRINOLOGY | Facility: CLINIC | Age: 30
End: 2020-01-03
Payer: COMMERCIAL

## 2020-01-03 ENCOUNTER — ANCILLARY PROCEDURE (OUTPATIENT)
Dept: ULTRASOUND IMAGING | Facility: CLINIC | Age: 30
End: 2020-01-03
Attending: INTERNAL MEDICINE
Payer: COMMERCIAL

## 2020-01-03 VITALS
BODY MASS INDEX: 24.71 KG/M2 | HEIGHT: 67 IN | SYSTOLIC BLOOD PRESSURE: 139 MMHG | DIASTOLIC BLOOD PRESSURE: 78 MMHG | HEART RATE: 80 BPM | WEIGHT: 157.4 LBS

## 2020-01-03 DIAGNOSIS — C73 PAPILLARY THYROID CARCINOMA (H): ICD-10-CM

## 2020-01-03 DIAGNOSIS — E04.1 THYROID NODULE: Primary | ICD-10-CM

## 2020-01-03 ASSESSMENT — PAIN SCALES - GENERAL: PAINLEVEL: NO PAIN (0)

## 2020-01-03 ASSESSMENT — MIFFLIN-ST. JEOR: SCORE: 1466.59

## 2020-01-03 NOTE — LETTER
1/3/2020       RE: Yumiko Marquez  [address]  [facility] [address]     Dear Colleague,    Thank you for referring your patient, Yumiko Marquez, to the [facility] ENDOCRINOLOGY at [facility]. Please see a copy of my visit note below.         Endocrinology Note         Yumiko is a 29 year old female presents today for papillary thyroid cancer status post right thyroidectomy.    HPI  Yumiko is a 29 year old female with history of ITP in 1995 presents today for papillary thyroid cancer status post right thyroidectomy. She is accompanied with her mother and sister. She was previously seen by Dr. Floyd.    She was noted to have mass in the right neck about 6 months ago. An ultrasound thyroid demonstrated multiple nodules in the right thyroid lobe, the largest was 3.7cm. 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  She underwent right thyroidectomy and isthmusectomy by Dr. Camargo on 11/26/2019.  Surgical pathology showed 4.3 cm papillary thyroid cancer, unifocal with clear margin.  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 The papillary thyroid cancer was well-circumscribed and encapsulated with predominantly follicular growth pattern.  However the multifocal growth of tall cells and a oncocytic changes and mitosis is focally increased up to 5 per 10 high-power fields.  The findings are consistent with focal poorly differentiated thyroid carcinoma of 10% however there was no capsular or angiolymphatic invasion.  Therefore this tumor is most likely not behave in an aggressive fashion.  No lymph node was removed.  Tumor staging pT3aNx.     She has been doing well after surgery.  She has not had any perioral tingling, muscle cramp or numbness. Her TSH on 1/2/2020 was 2.19.  She is very concerning about the next step of treatment.     Past Medical History  Past Medical History:   Diagnosis Date     Complication of anesthesia       History of ITP 1995     PONV (postoperative nausea and vomiting)        Allergies  No Known Allergies  Medications  Current Outpatient Medications   Medication Sig Dispense Refill     acetaminophen (TYLENOL) 325 MG tablet Take 2 tablets (650 mg) by mouth every 4 hours as needed for mild pain 50 tablet 0     ibuprofen (ADVIL/MOTRIN) 600 MG tablet Take 1 tablet (600 mg) by mouth every 6 hours as needed for other (mild and/or inflammatory pain) 30 tablet 0     vitamin D3 (CHOLECALCIFEROL) 2000 units tablet Take 1 tablet by mouth daily       Family History  family history includes Heart Disease in her maternal grandfather; Melanoma (age of onset: 60) in her maternal grandmother; Thyroid Disease in her paternal aunt; Thyroid Disease (age of onset: 45) in her maternal grandfather; Valvular heart disease in her maternal grandfather.   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    ROS  Constitutional: no weight change, good energy  Eyes: no vision change, diplopia or red eyes   Neck: no difficulty swallowing, no choking, no neck pain, no neck swelling  Cardiovascular: no chest pain, palpitations  Respiratory: no dyspnea, cough, shortness of breath or wheezing   GI: no nausea, vomiting, diarrhea or constipation, no abdominal pain   : no change in urine, no dysuria or hematuria  Musculoskeletal: no joint or muscle pain or swelling   Integumentary: no concerning lesions or moles   Neuro: no loss of strength or sensation, no numbness or tingling, no tremor, no dizziness, no headache   Endo: no polyuria or polydipsia, no temperature intolerance   Heme/Lymph: no concerning bumps, no  "bleeding problems   Allergy: no environmental allergies   Psych:+anxious, no sleep problems.    Physical Exam  /78   Pulse 80   Ht 1.702 m (5' 7\")   Wt 71.4 kg (157 lb 6.4 oz)   BMI 24.65 kg/m     Body mass index is 24.65 kg/m .  Constitutional: no distress, comfortable, pleasant   Eyes: anicteric, normal extra-ocular movements, no lid lag or retraction  Neck: no thyromegaly, no discrete nodule  Cardiovascular: regular rate and rhythm, normal S1 and S2, no murmurs  Respiratory: clear to auscultation, no wheezes or crackles, normal breath sounds   Gastrointestinal:  nontender, no hepatosmegaly, no masses   Musculoskeletal: no edema   Skin: no concerning lesions, no jaundice   Neurological: cranial nerves intact, normal gait, no tremor on outstretched hands bilaterally  Psychological: appropriate mood   Lymphatic: no cervical  lymphadenopathy.      RESULTS  I have personally reviewed labs and images. I also reviewed labs with patient and discussed the result and plan of care.    FNA 10/4/2019  A.  Thyroid, right #1, ultrasound guided fine needle aspiration:   - Suspicious for follicular neoplasm, Hï  rthle cell (oncocytic type).     The Paradox implied risk of malignancy and recommended clinical management:   Suspicious for a follicular neoplasm has a 25-40% risk of malignancy, recommended management is surgical lobectomy or molecular testing.     Specimen Adequacy: Satisfactory for evaluation.     B.  Thyroid, right #2, ultrasound guided fine needle aspiration:   - Suspicious for follicular neoplasm, Hï  rthle cell (oncocytic type).     The Paradox implied risk of malignancy and recommended clinical management:   Suspicious for a follicular neoplasm has a 25-40% risk of malignancy, recommended management is surgical lobectomy or molecular testing.     Surgical pathology 11/26/2019  SPECIMEN(S):   Thyroid, right lobe and isthmus     FINAL DIAGNOSIS:   Thyroid, right lobe and isthmus, hemithyroidectomy:   - " Papillary thyroid carcinoma, mixed types (see comment)        - Tumor focality: Unifocal        - Tumor laterality: Right lobe        - Largest tumor size: 4.2 cm        - Margins: Uninvolved by carcinoma        - Lymphatic invasion: Not identified        - Perineural invasion: Not identified        - Extrathyroidal extension: Not identified   - Follicular adenoma (1.6 cm)     COMMENT:   Sections reveal that the tumor is well circumscribed and encapsulated with  a predominantly follicular growth pattern.  There are multifocal papillary growths with tall cells and oncocytic changes.  There are also solid areas.  No definite necrosis is identified.  Mitosis is focally increased up to five per 10 high-power fields.  These findings are consistent with focal poorly differentiated thyroid carcinoma of approximately 10%. However, no capsular or angiolymphatic invasion is identified.  Therefore,  this tumor will most likely not behave in an aggressive fashion.     Immunostains with appropriate controls were performed.  Beta-Catenin highlights cell membrane without apparent   nuclear positivity.  Ki-67 labeling index varies from 5-10%.  No expression of HBME1 and CDX2 is identified in   the tumor cells.  These findings support the above diagnosis.     This case received intradepartmental consultation.     Report Name: Thyroid Gland - Resection        Status: Submitted Checklist Inst: 1      Last Updated By: Mago Sadler M.D., PhD, [facility], 12/03/2019 18:11:53     Part(s) Involved:   A: Thyroid, right lobe and isthmus   Synoptic Report:     CLINICAL     Clinical History:         - No known radiation exposure     SPECIMEN     Procedure:         - Right lobectomy with isthmusectomy (hemithyroidectomy)     TUMOR     Tumor Focality:         - Unifocal     Multiple Tumors of the Same Lineage       Tumors of the Same Cellular Lineage         Tumor Site:             - Right lobe         Histologic Type:             - Papillary  carcinoma variant - mixed types with tall cells, oncocytic and solid variants         Tumor Size: 4.2 cm         Extrathyroidal Extension:             - Not identified         Angioinvasion (vascular invasion):             - Not identified         Lymphatic Invasion:             - Not identified         Margins:             - Uninvolved by carcinoma     LYMPH NODES     Regional Lymph Nodes:         - No lymph nodes submitted or found     PATHOLOGIC STAGE CLASSIFICATION (PTNM, AJCC 8TH EDITION)  Primary Tumor (pT):  - pT3a     Regional Lymph Nodes (pN): - pNX     ASSESSMENT:    Yumiko is a 29 year old female with history of ITP in 1995 presents today for papillary thyroid cancer status post right thyroidectomy and isthmusectomy    1) papillary thyroid cancer status post right thyroidectomy and isthmusectomy in November 2019: Neck mass was noted 6 months ago.  FNA of the right neck mass showed suspicious for follicular neoplasm, Hurthle cell.  Surgical pathology showed 4 x 2 cm papillary thyroid cancer mixed types with tall cells, oncocytic and solid variants without extrathyroidal extension or angioinvasion.  No lymph node was removed.    -I have discussed with the patient regarding the pathology report, next steps for treatment that includes completion thyroidectomy and I-131 treatments although she has no nodules on the left thyroid lobe.  I have also discussed side effects of I-131 treatment. I also discussed thyroid suppression therapy.    -I will get ultrasound of the lateral neck to look at the lymph node today.  -I prefer to referher back to  for completion thyroidectomy. However, she is reluctant due to multiple factors. I will discuss with  today.    PLAN:   -Ultrasound lateral neck and thyroid today  -Return to clinic in 3 months    Elder Castelan MD  Division of Diabetes and Endocrinology  Department of Medicine  [phone]      Again, thank you for allowing me to  participate in the care of your patient.      Sincerely,    Elder Castelan MD

## 2020-01-10 ENCOUNTER — TELEPHONE (OUTPATIENT)
Dept: OTOLARYNGOLOGY | Facility: CLINIC | Age: 30
End: 2020-01-10

## 2020-01-10 NOTE — TELEPHONE ENCOUNTER
Called patient and informed her that she is scheduled for a follow up with Dr. Camargo on 2/10 (at the request of Dr. Friedman). Provided the ENT call center number to call back and reschedule if need be.

## 2020-01-15 NOTE — PROGRESS NOTES
Service Date: 2019      HISTORY OF PRESENT ILLNESS:  This is a 29-year-old female who underwent a right thyroid lobectomy and isthmusectomy on 2019.  She presents today for a postoperative visit.  Since the surgery, the patient has had no problems with voice quality, inspiration or swallowing.  She has had no symptoms of hypo or hyperthyroidism.  No symptoms of hypocalcemia.      PHYSICAL EXAMINATION:  The wound is healing well.  The Dermabond was removed and the sutures were clipped at the skin edges.  There is no evidence of hematoma, seroma or infection.      PATHOLOGY:  I had a lengthy discussion with this patient regarding her pathology report.  This is consistent with a 4.2 cm papillary thyroid carcinoma.  Margins are uninvolved, lymphatic invasion is not identified, perineural invasion is not identified and extrathyroidal extension is not identified.  This is considered a T3aNX papillary thyroid carcinoma.      PLAN:  Because of the size, particularly since it is greater than 4 cm, I recommended strongly to the patient that she have a completion thyroidectomy.  She had multiple questions as to why this happened.  I explained to her that she did not do anything to cause this to happen.  She is also traveling to [facility] for 2 weeks in January.  I think that is reasonable and then I instructed her to come back for completion thyroidectomy.  She is scheduled to see an endocrinologist and I suspect the Endocrinology team will concur with my recommendations.  Again, I recommended a completion thyroidectomy.  The procedure and risks were discussed with the patient.  I would like to get her scheduled for surgery, but she wanted to wait until after she got back from [facility].         JIGNESH POND MD             D: 2020   T: 01/15/2020   MT: marcelino      Name:     CYNTHIA ALEJANDRO   MRN:      [id number]        Account:      [id number]   :      1990           Service Date: 2019       Document: [id number]

## 2020-04-01 NOTE — PROGRESS NOTES
"Yumiko Marquez is a 29 year old female who is being evaluated via a billable telephone visit.      The patient has been notified of following:     \"This telephone visit will be conducted via a call between you and your physician/provider. We have found that certain health care needs can be provided without the need for a physical exam.  This service lets us provide the care you need with a short phone conversation.  If a prescription is necessary we can send it directly to your pharmacy.  If lab work is needed we can place an order for that and you can then stop by our lab to have the test done at a later time.    If during the course of the call the physician/provider feels a telephone visit is not appropriate, you will not be charged for this service.\"     Patient has given verbal consent for Telephone visit?  Yes    Yumiko Marquez complains of    Chief Complaint   Patient presents with     RECHECK     thyroid nodule     I have reviewed and updated the patient's Past Medical History, Social History, Family History and Medication List.     ALLERGIES  Patient has no known allergies.    Additional provider notes:   Yumiko is a 29 year old female with history of ITP in 1995 and papillary thyroid cancer status post right thyroidectomy who has telephone visit today for follow up. She was previously seen by me in Jan 2020.     She was noted to have mass in the right neck about 6 months ago. An ultrasound thyroid demonstrated multiple nodules in the right thyroid lobe, the largest was 3.7cm. 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  She underwent right thyroidectomy and isthmusectomy by Dr. Camargo on 11/26/2019.  Surgical pathology showed 4.3 cm papillary thyroid cancer, unifocal with clear margin.  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 The papillary thyroid cancer was well-circumscribed and encapsulated with predominantly follicular growth pattern.  However the " multifocal growth of tall cells and a oncocytic changes and mitosis is focally increased up to 5 per 10 high-power fields.  The findings are consistent with focal poorly differentiated thyroid carcinoma of 10% however there was no capsular or angiolymphatic invasion.  Therefore this tumor is most likely not behave in an aggressive fashion.  No lymph node was removed.  Tumor staging pT3aNx.      Interim history:  At the last visit, I have discussed with her about completion thyroidectomy and possible I-131 treatment.  However patient was not ready to have another surgery at that time.  She is concerned about her financial constraint and the need to take her thyroid replacement lifelong.  Since our last conversation in January, she has been doing well.  She has not had any fatigue, perioral tingling, muscle cramp or numbness. Her TSH on 1/2/2020 was 2.19.      Past Medical and Surgical History  Papillary thyroid cancer s/p right thyroidectomy    Family history  Maternal uncle had prostate cancer  Maternal grandmother had T-cell lymphoma  No thyroid cancer in the family      Medication reviewed    Current Outpatient Medications   Medication Sig Dispense Refill     acetaminophen (TYLENOL) 325 MG tablet Take 2 tablets (650 mg) by mouth every 4 hours as needed for mild pain 50 tablet 0     ibuprofen (ADVIL/MOTRIN) 600 MG tablet Take 1 tablet (600 mg) by mouth every 6 hours as needed for other (mild and/or inflammatory pain) 30 tablet 0     vitamin D3 (CHOLECALCIFEROL) 2000 units tablet Take 1 tablet by mouth daily       Lab and images reviewed           US thyroid 1/3/2020  Thyroid parenchyma: Homogeneous appearance of the thyroid remnant.  The right lobe is surgically absent.  The thyroid isthmus measures: 0.3 cm      The left lobe of the thyroid measures: 1.6 x 1.3 x 4.9 cm, compared to 1.7 x 1.1 x 4.1 cm. No suspicious nodules in the left lobe of the thyroid.                                                                   Impression:   Interval postsurgical changes of partial thyroidectomy. No suspicious nodules in the thyroid remnant.    US neck 1/3/2020  Lymph nodes are measured bilaterally with measurements given in transverse, AP, and craniocaudal dimensions as follows:     Right:  Level 2: Hypoechoic lymph node measuring 1.8 x 1.0 x 2.9 cm.  Level 5: Small lymph node with a preserved fatty hilum measuring 0.4 x 0.5 to 0.8 cm.  No suspicious lymph nodes identified in levels 3, 4, 6 or 7.     Left:  Level 2: Hypoechoic lymph node measuring 1.2 x 0.6 x 2.0 cm  No suspicious lymph nodes identified in levels 3 through 7.     Patient reporting a pulsatile lump at the base of the left neck. Large artery visualized in this region, presumably the subclavian.                                                                 IMPRESSION:  Soft tissue ultrasound of the neck with lymph nodes as detailed above. This includes enlarged hypoechoic lymph nodes in levels 2 and 5 on the right in level 2 on the left.    ASSESSMENT:   Yumiko is a 29 year old female with history of ITP in 1995 presents today for papillary thyroid cancer status post right thyroidectomy and isthmusectomy. She has telephone visit today for follow up.     1) papillary thyroid cancer status post right thyroidectomy and isthmusectomy in November 2019: Neck mass was noted 6 months ago.  FNA of the right neck mass showed suspicious for follicular neoplasm, Hurthle cell.  Surgical pathology showed 4 x 2 cm papillary thyroid cancer mixed types with tall cells, oncocytic and solid variants without extrathyroidal extension or angioinvasion.  No lymph node was removed.     -I have discussed with the patient regarding the pathology report, next steps for treatment that includes completion thyroidectomy and I-131 treatments although she has no nodules on the left thyroid lobe. However, the patient is not ready to have completion surgery at this time due to financial constraint and  concern of the need to take a thyroid replacement lifelong. She prefers to delay it for now. She would prefer to have close monitoring with ultrasound thyroid and lateral neck.     -I will get ultrasound of the lateral neck to look at the lymph node in June 2020     PLAN:   -Ultrasound lateral neck and thyroid and obtain TFT in June 2020     Time start: 8:35 am  Time end: 9.04 am  Phone call duration: 29 minutes    Elder Castelan MD  Division of Diabetes and Endocrinology  Department of Medicine

## 2020-04-03 ENCOUNTER — VIRTUAL VISIT (OUTPATIENT)
Dept: ENDOCRINOLOGY | Facility: CLINIC | Age: 30
End: 2020-04-03
Payer: COMMERCIAL

## 2020-04-03 DIAGNOSIS — C73 PAPILLARY THYROID CARCINOMA (H): Primary | ICD-10-CM

## 2020-04-03 NOTE — PATIENT INSTRUCTIONS
Please schedule ultrasound thyroid and neck in June 2020    If you have any questions, please do not hesitate to call clinic line at [phone] and ask for Endocrinology clinic.  If you need to fax, please fax to clinic fax number at [phone]    After clinic hours or weekends, please contact [phone] and ask for Endocrinologist-on call      Sincerely,    Elder Castelan MD  Endocrinology

## 2020-06-22 ENCOUNTER — ANCILLARY PROCEDURE (OUTPATIENT)
Dept: ULTRASOUND IMAGING | Facility: CLINIC | Age: 30
End: 2020-06-22
Attending: INTERNAL MEDICINE
Payer: COMMERCIAL

## 2020-06-22 DIAGNOSIS — C73 PAPILLARY THYROID CARCINOMA (H): ICD-10-CM

## 2020-10-07 ENCOUNTER — MYC MEDICAL ADVICE (OUTPATIENT)
Dept: FAMILY MEDICINE | Facility: CLINIC | Age: 30
End: 2020-10-07

## 2020-11-12 DIAGNOSIS — C73 PAPILLARY THYROID CARCINOMA (H): Primary | ICD-10-CM

## 2021-01-03 ENCOUNTER — HEALTH MAINTENANCE LETTER (OUTPATIENT)
Age: 31
End: 2021-01-03

## 2021-01-07 ENCOUNTER — ANCILLARY PROCEDURE (OUTPATIENT)
Dept: ULTRASOUND IMAGING | Facility: CLINIC | Age: 31
End: 2021-01-07
Attending: INTERNAL MEDICINE
Payer: COMMERCIAL

## 2021-01-07 DIAGNOSIS — C73 PAPILLARY THYROID CARCINOMA (H): ICD-10-CM

## 2021-01-07 PROCEDURE — 99207 US HEAD NECK SOFT TISSUE: CPT | Mod: GC | Performed by: RADIOLOGY

## 2021-01-07 PROCEDURE — 76536 US EXAM OF HEAD AND NECK: CPT | Mod: GC | Performed by: RADIOLOGY

## 2021-02-18 ENCOUNTER — E-VISIT (OUTPATIENT)
Dept: URGENT CARE | Facility: URGENT CARE | Age: 31
End: 2021-02-18
Payer: COMMERCIAL

## 2021-02-18 DIAGNOSIS — H10.31 ACUTE BACTERIAL CONJUNCTIVITIS OF RIGHT EYE: Primary | ICD-10-CM

## 2021-02-18 PROCEDURE — 99422 OL DIG E/M SVC 11-20 MIN: CPT | Performed by: PREVENTIVE MEDICINE

## 2021-02-18 RX ORDER — ERYTHROMYCIN 5 MG/G
0.5 OINTMENT OPHTHALMIC 4 TIMES DAILY
Qty: 3.5 G | Refills: 0 | Status: SHIPPED | OUTPATIENT
Start: 2021-02-18 | End: 2021-02-28

## 2021-02-18 NOTE — PATIENT INSTRUCTIONS
Bacterial Conjunctivitis    You have an infection in the membranes covering the white part of the eye. This part of the eye is called the conjunctiva. The infection is called conjunctivitis. The most common symptoms of conjunctivitis include a thick, pus-like discharge from the eye, swollen eyelids, redness, eyelids sticking together upon awakening, and a gritty or scratchy feeling in the eye. Your infection was caused by bacteria. It may be treated with medicine. With treatment, the infection takes about 7 to 10 days to resolve.   Home care    Use prescribed antibiotic eye drops or ointment as directed to treat the infection.    Apply a warm compress (towel soaked in warm water) to the affected eye 3 to 4 times a day. Do this just before applying medicine to the eye.    Use a warm, wet cloth to wipe away crusting of the eyelids in the morning. This is caused by mucus drainage during the night. You may also use saline irrigating solution or artificial tears to rinse away mucus in the eye. Do not put a patch over the eye.    Wash your hands before and after touching the infected eye. This is to prevent spreading the infection to the other eye, and to other people. Don't share your towels or washcloths with others.    You may use acetaminophen or ibuprofen to control pain, unless another medicine was prescribed. Talk with your healthcare provider before using these medicines if you have chronic liver or kidney disease. Also talk with your provider if you have ever had a stomach ulcer or digestive bleeding.    Don't wear contact lenses until your eyes have healed and all symptoms are gone.    Follow-up care  Follow up with your healthcare provider, or as advised.  When to seek medical advice  Call your healthcare provider right away if any of these occur:    Worsening vision    Increasing pain in the eye    Increasing swelling or redness of the eyelid    Redness spreading around the eye  [employer] last reviewed this  educational content on 4/1/2020 2000-2020 [employer] [address] [place] [address], [place] [address]. All rights reserved. This information is not intended as a substitute for professional medical care. Always follow your healthcare professional's instructions.

## 2021-02-24 ASSESSMENT — ACTIVITIES OF DAILY LIVING (ADL): CURRENT_FUNCTION: NO ASSISTANCE NEEDED

## 2021-02-25 ENCOUNTER — MYC MEDICAL ADVICE (OUTPATIENT)
Dept: FAMILY MEDICINE | Facility: CLINIC | Age: 31
End: 2021-02-25

## 2021-02-25 ENCOUNTER — OFFICE VISIT (OUTPATIENT)
Dept: FAMILY MEDICINE | Facility: CLINIC | Age: 31
End: 2021-02-25
Payer: COMMERCIAL

## 2021-02-25 VITALS
TEMPERATURE: 98.9 F | RESPIRATION RATE: 14 BRPM | DIASTOLIC BLOOD PRESSURE: 58 MMHG | BODY MASS INDEX: 24.64 KG/M2 | WEIGHT: 157 LBS | SYSTOLIC BLOOD PRESSURE: 108 MMHG | HEART RATE: 80 BPM | HEIGHT: 67 IN

## 2021-02-25 DIAGNOSIS — C73 MALIGNANT NEOPLASM OF THYROID GLAND (H): ICD-10-CM

## 2021-02-25 DIAGNOSIS — H57.11 ACUTE RIGHT EYE PAIN: ICD-10-CM

## 2021-02-25 DIAGNOSIS — Z00.00 ENCOUNTER FOR MEDICARE ANNUAL WELLNESS EXAM: ICD-10-CM

## 2021-02-25 DIAGNOSIS — Z00.00 ROUTINE GENERAL MEDICAL EXAMINATION AT A HEALTH CARE FACILITY: Primary | ICD-10-CM

## 2021-02-25 PROCEDURE — 86780 TREPONEMA PALLIDUM: CPT | Mod: 90 | Performed by: FAMILY MEDICINE

## 2021-02-25 PROCEDURE — 87624 HPV HI-RISK TYP POOLED RSLT: CPT | Performed by: FAMILY MEDICINE

## 2021-02-25 PROCEDURE — 87389 HIV-1 AG W/HIV-1&-2 AB AG IA: CPT | Performed by: FAMILY MEDICINE

## 2021-02-25 PROCEDURE — 87591 N.GONORRHOEAE DNA AMP PROB: CPT | Performed by: FAMILY MEDICINE

## 2021-02-25 PROCEDURE — 99213 OFFICE O/P EST LOW 20 MIN: CPT | Mod: 25 | Performed by: FAMILY MEDICINE

## 2021-02-25 PROCEDURE — 99395 PREV VISIT EST AGE 18-39: CPT | Performed by: FAMILY MEDICINE

## 2021-02-25 PROCEDURE — 99000 SPECIMEN HANDLING OFFICE-LAB: CPT | Performed by: FAMILY MEDICINE

## 2021-02-25 PROCEDURE — 87491 CHLMYD TRACH DNA AMP PROBE: CPT | Performed by: FAMILY MEDICINE

## 2021-02-25 PROCEDURE — 36415 COLL VENOUS BLD VENIPUNCTURE: CPT | Performed by: FAMILY MEDICINE

## 2021-02-25 PROCEDURE — G0145 SCR C/V CYTO,THINLAYER,RESCR: HCPCS | Performed by: FAMILY MEDICINE

## 2021-02-25 RX ORDER — POLYMYXIN B SULFATE AND TRIMETHOPRIM 1; 10000 MG/ML; [USP'U]/ML
1-2 SOLUTION OPHTHALMIC EVERY 4 HOURS
Qty: 10 ML | Refills: 0 | Status: SHIPPED | OUTPATIENT
Start: 2021-02-25 | End: 2021-07-30

## 2021-02-25 ASSESSMENT — MIFFLIN-ST. JEOR: SCORE: 1459.78

## 2021-02-25 ASSESSMENT — ACTIVITIES OF DAILY LIVING (ADL): CURRENT_FUNCTION: NO ASSISTANCE NEEDED

## 2021-02-25 NOTE — PATIENT INSTRUCTIONS
Preventive Health Recommendations  Female Ages 26 - 39  Yearly exam:   See your health care provider every year in order to    Review health changes.     Discuss preventive care.      Review your medicines if you your doctor has prescribed any.    Until age 30: Get a Pap test every three years (more often if you have had an abnormal result).    After age 30: Talk to your doctor about whether you should have a Pap test every 3 years or have a Pap test with HPV screening every 5 years.   You do not need a Pap test if your uterus was removed (hysterectomy) and you have not had cancer.  You should be tested each year for STDs (sexually transmitted diseases), if you're at risk.   Talk to your provider about how often to have your cholesterol checked.  If you are at risk for diabetes, you should have a diabetes test (fasting glucose).  Shots: Get a flu shot each year. Get a tetanus shot every 10 years.   Nutrition:     Eat at least 5 servings of fruits and vegetables each day.    Eat whole-grain bread, whole-wheat pasta and brown rice instead of white grains and rice.    Get adequate Calcium and Vitamin D.     Lifestyle    Exercise at least 150 minutes a week (30 minutes a day, 5 days of the week). This will help you control your weight and prevent disease.    Limit alcohol to one drink per day.    No smoking.     Wear sunscreen to prevent skin cancer.    See your dentist every six months for an exam and cleaning.    Patient Education   Personalized Prevention Plan  You are due for the preventive services outlined below.  Your care team is available to assist you in scheduling these services.  If you have already completed any of these items, please share that information with your care team to update in your medical record.  Health Maintenance Due   Topic Date Due     Discuss Advance Care Planning  1990     Hepatitis C Screening  10/23/2007     Preventive Care Visit  04/08/2020       Exercise for a Healthier  Heart     Exercise with a friend. When activity is fun, you're more likely to stick with it.   You may wonder how you can improve the health of your heart. If you re thinking about exercise, you re on the right track. You don t need to become an athlete. But you do need a certain amount of brisk exercise to help strengthen your heart. If you have been diagnosed with a heart condition, your healthcare provider may advise exercise to help stabilize your condition. To help make exercise a habit, choose safe, fun activities.   Before you start  Check with your healthcare provider before starting an exercise program. This is especially important if you have not been active for a while. It's also important if you have a long-term (chronic) health problem such as heart disease, diabetes, or obesity. Or if you are at high risk for having these problems.   Why exercise?  Exercising regularly offers many healthy rewards. It can help you do all of the following:    Improve your blood cholesterol level to help prevent further heart trouble    Lower your blood pressure to help prevent a stroke or heart attack    Control diabetes, or reduce your risk of getting this disease    Improve your heart and lung function    Reach and stay at a healthy weight    Make your muscles stronger so you can stay active    Prevent falls and fractures by slowing the loss of bone mass (osteoporosis)    Manage stress better    Reduce your blood pressure    Improve your sense of self and your body image  Exercise tips      Ease into your routine. Set small goals. Then build on them. If you are not sure what your activity level should be, talk with your healthcare provider first before starting an exercise routine.    Exercise on most days. Aim for a total of 150 minutes (2 hours and 30 minutes) or more of moderate-intensity aerobic activity each week. Or 75 minutes (1 hour and 15 minutes) or more of vigorous-intensity aerobic activity each week. Or  try for a combination of both. Moderate activity means that you breathe heavier and your heart rate increases but you can still talk. Think about doing 40 minutes of moderate exercise, 3 to 4 times a week. For best results, activity should last for about 40 minutes to lower blood pressure and cholesterol. It's OK to work up to the 40-minute period over time. Examples of moderate-intensity activity are walking 1 mile in 15 minutes. Or doing 30 to 45 minutes of yard work.    Step up your daily activity level.  Along with your exercise program, try being more active the whole day. Walk instead of drive. Or park further away so that you take more steps each day. Do more household tasks or yard work. You may not be able to meet the advised mount of physical activity. But doing some moderate- or vigorous-intensity aerobic activity can help reduce your risk for heart disease. Your healthcare provider can help you figure out what is best for you.    Choose 1 or more activities you enjoy.  Walking is one of the easiest things you can do. You can also try swimming, riding a bike, dancing, or taking an exercise class.    When to call your healthcare provider  Call your healthcare provider if you have any of these:     Chest pain or feel dizzy or lightheaded    Burning, tightness, pressure, or heaviness in your chest, neck, shoulders, back, or arms    Abnormal shortness of breath    More joint or muscle pain    A very fast or irregular heartbeat (palpitations)  Carrie last reviewed this educational content on 7/1/2019 2000-2020 [employer]. [address] [place] [address], [place] [address]. All rights reserved. This information is not intended as a substitute for professional medical care. Always follow your healthcare professional's instructions.

## 2021-02-25 NOTE — PROGRESS NOTES
"   SUBJECTIVE:   CC: Yumiko Marquez is an 30 year old woman who presents for preventive health visit.       Patient has been advised of split billing requirements and indicates understanding: Yes  Healthy Habits:     In general, how would you rate your overall health?  Good    Getting at least 3 servings of Calcium per day:  Yes    Bi-annual eye exam:  Yes    Dental care twice a year:  Yes    Sleep apnea or symptoms of sleep apnea:  None    Diet:  Regular (no restrictions)    Frequency of exercise:  1 day/week    Duration of exercise:  15-30 minutes    Do you usually eat at least 4 servings of fruit and vegetables a day, include whole grains    & fiber and avoid regularly eating high fat or \"junk\" foods?  Yes    Taking medications regularly:  Yes    Medication side effects:  Not applicable    Ability to successfully perform activities of daily living:  No assistance needed    Home Safety:  No safety concerns identified    Hearing Impairment:  No hearing concerns    In the past 6 months, have you been bothered by leaking of urine?  No    In general, how would you rate your overall mental or emotional health?  Good      PHQ-2 Total Score: 0    Additional concerns today:  No    (Z00.00) Routine general medical examination at a health care facility  (primary encounter diagnosis)  Comment: Would like STD screening  Plan: Pap imaged thin layer screen with HPV -         recommended age 30 - 65 years (select HPV order        below), HPV High Risk Types DNA Cervical,         NEISSERIA GONORRHOEA PCR, CHLAMYDIA TRACHOMATIS        PCR, HIV Antigen Antibody Combo, Treponema Abs         w Reflex to RPR and Titer            (C73) Malignant neoplasm of thyroid gland (H)  Comment: History of thyroid cancer has had surgery we updated her chart to reflect this she has ongoing surveillance with endocrinology has plans for another ultrasound of the neck in December.  She had a right thyroidectomy  Plan:     (H57.11) Acute right eye " pain  Comment: Was diagnosed with conjunctivitis and has been treated with erythromycin ointment.  Feels its not really improving.  Wonders what her next step is.  Describes irritation and slight discomfort no changes to her vision however  Plan: trimethoprim-polymyxin b (POLYTRIM) 13476-1.1         UNIT/ML-% ophthalmic solution, EYE ADULT         REFERRAL            (Z00.00) Encounter for Medicare annual wellness exam  Comment:   Plan:           Pink eye e-visit last week. Redness and pus is still there.     Today's PHQ-2 Score:   PHQ-2 ( 1999 Pfizer) 2/24/2021   Q1: Little interest or pleasure in doing things 0   Q2: Feeling down, depressed or hopeless 0   PHQ-2 Score 0   Q1: Little interest or pleasure in doing things Not at all   Q2: Feeling down, depressed or hopeless Not at all   PHQ-2 Score 0       Abuse: Current or Past (Physical, Sexual or Emotional) - no  Do you feel safe in your environment? Yes    Have you ever done Advance Care Planning? (For example, a Health Directive, POLST, or a discussion with a medical provider or your loved ones about your wishes): no    Social History     Tobacco Use     Smoking status: Never Smoker     Smokeless tobacco: Never Used   Substance Use Topics     Alcohol use: Yes     Frequency: 2-3 times a week     Drinks per session: 1 or 2         Alcohol Use 2/24/2021   Prescreen: >3 drinks/day or >7 drinks/week? No         Reviewed orders with patient.  Reviewed health maintenance and updated orders accordingly - Yes  Lab work is in process  Labs reviewed in EPIC  BP Readings from Last 3 Encounters:   02/25/21 108/58   01/03/20 139/78   12/16/19 113/76    Wt Readings from Last 3 Encounters:   02/25/21 71.2 kg (157 lb)   01/03/20 71.4 kg (157 lb 6.4 oz)   12/16/19 68 kg (150 lb)                  Patient Active Problem List   Diagnosis     Strain of left knee     Raynaud's disease without gangrene     Pain of toe of left foot     Thyroid enlarged     Thyroid nodule     Malignant  "neoplasm of thyroid gland (H)     Past Surgical History:   Procedure Laterality Date     APPENDECTOMY  2001     THYROIDECTOMY Right 11/26/2019    Procedure: Right Thyroidectomy and Isthmusectomy;  Surgeon: Olivia Camargo MD;  Location: [facility] OR     TONSILLECTOMY         Social History     Tobacco Use     Smoking status: Never Smoker     Smokeless tobacco: Never Used   Substance Use Topics     Alcohol use: Yes     Frequency: 2-3 times a week     Drinks per session: 1 or 2     Family History   Problem Relation Age of Onset     Thyroid Disease Maternal Grandfather 45     Heart Disease Maternal Grandfather      Valvular heart disease Maternal Grandfather      Thyroid Disease Paternal Aunt         maybe     Melanoma Maternal Grandmother 60     Diabetes No family hx of      Hyperlipidemia No family hx of          Current Outpatient Medications   Medication Sig Dispense Refill     erythromycin (ROMYCIN) 5 MG/GM ophthalmic ointment Place 0.5 inches into the right eye 4 times daily for 10 days 1/2\" ribbon to affected eye(s) four times daily for 5-7 days 3.5 g 0     trimethoprim-polymyxin b (POLYTRIM) 61683-0.1 UNIT/ML-% ophthalmic solution Place 1-2 drops into the right eye every 4 hours 10 mL 0     vitamin D3 (CHOLECALCIFEROL) 2000 units tablet Take 1 tablet by mouth daily         Breast CA Risk Screening:  No flowsheet data found.      Patient under 40 years of age: Routine Mammogram Screening not recommended.   Pertinent mammograms are reviewed under the imaging tab.    History of abnormal Pap smear: NO - age 30-65 PAP every 5 years with negative HPV co-testing recommended     Reviewed and updated as needed this visit by clinical staff                 Reviewed and updated as needed this visit by Provider                Past Medical History:   Diagnosis Date     Complication of anesthesia      History of ITP 1995     PONV (postoperative nausea and vomiting)       Past Surgical History:   Procedure Laterality Date     " "APPENDECTOMY  2001     THYROIDECTOMY Right 11/26/2019    Procedure: Right Thyroidectomy and Isthmusectomy;  Surgeon: Olivia Camargo MD;  Location: [facility] OR     TONSILLECTOMY         Review of Systems  CONSTITUTIONAL: NEGATIVE for fever, chills, change in weight  INTEGUMENTARU/SKIN: NEGATIVE for worrisome rashes, moles or lesions  EYES: NEGATIVE for vision changes or irritation  ENT: NEGATIVE for ear, mouth and throat problems  RESP: NEGATIVE for significant cough or SOB  BREAST: NEGATIVE for masses, tenderness or discharge  CV: NEGATIVE for chest pain, palpitations or peripheral edema  GI: NEGATIVE for nausea, abdominal pain, heartburn, or change in bowel habits  : NEGATIVE for unusual urinary or vaginal symptoms. Periods are regular.  MUSCULOSKELETAL: NEGATIVE for significant arthralgias or myalgia  NEURO: NEGATIVE for weakness, dizziness or paresthesias  PSYCHIATRIC: NEGATIVE for changes in mood or affect     OBJECTIVE:   /58   Pulse 80   Temp 98.9  F (37.2  C) (Tympanic)   Resp 14   Ht 1.702 m (5' 7\")   Wt 71.2 kg (157 lb)   LMP 01/28/2021   Breastfeeding No   BMI 24.59 kg/m    Physical Exam  GENERAL: healthy, alert and no distress  EYES: Eyes grossly normal to inspection, PERRL and conjunctivae and sclerae normal small possible pterygium forming at left side of her right iris (nasolabial side) with injection    HENT: ear canals and TM's normal, nose and mouth without ulcers or lesions  NECK: no adenopathy, no asymmetry, masses, or scars and thyroid normal to palpation  RESP: lungs clear to auscultation - no rales, rhonchi or wheezes  BREAST: normal without masses, tenderness or nipple discharge and no palpable axillary masses or adenopathy  CV: regular rate and rhythm, normal S1 S2, no S3 or S4, no murmur, click or rub, no peripheral edema and peripheral pulses strong  ABDOMEN: soft, nontender, no hepatosplenomegaly, no masses and bowel sounds normal   (female): normal female external " genitalia, normal urethral meatus, vaginal mucosa pink, moist, well rugated, and normal cervix/adnexa/uterus without masses or discharge  MS: no gross musculoskeletal defects noted, no edema  SKIN: no suspicious lesions or rashes  NEURO: Normal strength and tone, mentation intact and speech normal  PSYCH: mentation appears normal, affect normal/bright    Diagnostic Test Results:  Labs reviewed in Epic    ASSESSMENT/PLAN:   1. Routine general medical examination at a health care facility  Reviewed after visit summary okay for STD screens per patient request  - Pap imaged thin layer screen with HPV - recommended age 30 - 65 years (select HPV order below)  - HPV High Risk Types DNA Cervical  - NEISSERIA GONORRHOEA PCR  - CHLAMYDIA TRACHOMATIS PCR  - HIV Antigen Antibody Combo  - Treponema Abs w Reflex to RPR and Titer    2. Malignant neoplasm of thyroid gland (H)  Updated her chart and will have her repeat ultrasound of the thyroid in December    3. Acute right eye pain  Can consider trying a different antibiotic drop however if this is not improving it is possible that she could have a small pterygium starting.  I referred her to an ophthalmologist for an evaluation if this is not improving.  - trimethoprim-polymyxin b (POLYTRIM) 88834-8.1 UNIT/ML-% ophthalmic solution; Place 1-2 drops into the right eye every 4 hours  Dispense: 10 mL; Refill: 0  - EYE ADULT REFERRAL; Future    4. Encounter for Medicare annual wellness exam     In addition to the preventive visit, 15 minutes was spent face to face with (>50%) counseling and/or coordination of care regarding addition concerns and symptoms.      Patient has been advised of split billing requirements and indicates understanding: Yes  COUNSELING:  Reviewed preventive health counseling, as reflected in patient instructions       Regular exercise       Healthy diet/nutrition       Safe sex practices/STD prevention    Estimated body mass index is 24.65 kg/m  as calculated  "from the following:    Height as of 1/3/20: 1.702 m (5' 7\").    Weight as of 1/3/20: 71.4 kg (157 lb 6.4 oz).        She reports that she has never smoked. She has never used smokeless tobacco.      Counseling Resources:  ATP IV Guidelines  Pooled Cohorts Equation Calculator  Breast Cancer Risk Calculator  BRCA-Related Cancer Risk Assessment: FHS-7 Tool  FRAX Risk Assessment  ICSI Preventive Guidelines  Dietary Guidelines for Americans, 2010  USDA's MyPlate  ASA Prophylaxis  Lung CA Screening    Aleta Holder MD  [facility]  "

## 2021-02-26 LAB
C TRACH DNA SPEC QL NAA+PROBE: NEGATIVE
HIV 1+2 AB+HIV1 P24 AG SERPL QL IA: NONREACTIVE
N GONORRHOEA DNA SPEC QL NAA+PROBE: NEGATIVE
SPECIMEN SOURCE: NORMAL
SPECIMEN SOURCE: NORMAL
T PALLIDUM AB SER QL: NONREACTIVE

## 2021-03-01 LAB
COPATH REPORT: NORMAL
PAP: NORMAL

## 2021-03-03 ENCOUNTER — PATIENT OUTREACH (OUTPATIENT)
Dept: FAMILY MEDICINE | Facility: CLINIC | Age: 31
End: 2021-03-03

## 2021-03-03 LAB
FINAL DIAGNOSIS: ABNORMAL
HPV HR 12 DNA CVX QL NAA+PROBE: POSITIVE
HPV16 DNA SPEC QL NAA+PROBE: NEGATIVE
HPV18 DNA SPEC QL NAA+PROBE: NEGATIVE
SPECIMEN DESCRIPTION: ABNORMAL
SPECIMEN SOURCE CVX/VAG CYTO: ABNORMAL

## 2021-03-04 NOTE — RESULT ENCOUNTER NOTE
Hello,    Great news, your results were normal.  The labs show no signs of sexually transmitted disease excellent news.    Aleta Holder MD

## 2021-03-26 ENCOUNTER — MYC MEDICAL ADVICE (OUTPATIENT)
Dept: ENDOCRINOLOGY | Facility: CLINIC | Age: 31
End: 2021-03-26

## 2021-03-26 DIAGNOSIS — C73 PAPILLARY THYROID CARCINOMA (H): Primary | ICD-10-CM

## 2021-07-30 ENCOUNTER — E-VISIT (OUTPATIENT)
Dept: URGENT CARE | Facility: CLINIC | Age: 31
End: 2021-07-30
Payer: COMMERCIAL

## 2021-07-30 DIAGNOSIS — R21 RASH AND NONSPECIFIC SKIN ERUPTION: Primary | ICD-10-CM

## 2021-07-30 PROCEDURE — 99207 PR NON-BILLABLE SERV PER CHARTING: CPT | Performed by: EMERGENCY MEDICINE

## 2021-07-30 NOTE — PATIENT INSTRUCTIONS
Dear Yumiko Marquez,    This rash needs to be examined in person. Does not seem to be related to a tick bite. Please come in today to be seen.      We are sorry you are not feeling well. Based on the responses you provided, it is recommended that you be seen in-person in urgent care so we can better evaluate your symptoms. Please click here to find the nearest urgent care location to you.   You will not be charged for this Visit. Thank you for trusting us with your care.    Yoav Montez MD

## 2021-07-31 ENCOUNTER — OFFICE VISIT (OUTPATIENT)
Dept: URGENT CARE | Facility: URGENT CARE | Age: 31
End: 2021-07-31
Payer: COMMERCIAL

## 2021-07-31 VITALS
BODY MASS INDEX: 24.8 KG/M2 | OXYGEN SATURATION: 100 % | HEART RATE: 63 BPM | WEIGHT: 158 LBS | RESPIRATION RATE: 16 BRPM | SYSTOLIC BLOOD PRESSURE: 117 MMHG | HEIGHT: 67 IN | TEMPERATURE: 97.6 F | DIASTOLIC BLOOD PRESSURE: 68 MMHG

## 2021-07-31 DIAGNOSIS — R21 RASH: Primary | ICD-10-CM

## 2021-07-31 PROCEDURE — 99213 OFFICE O/P EST LOW 20 MIN: CPT | Performed by: FAMILY MEDICINE

## 2021-07-31 RX ORDER — TRIAMCINOLONE ACETONIDE 1 MG/G
CREAM TOPICAL 2 TIMES DAILY
Qty: 45 G | Refills: 0 | Status: SHIPPED | OUTPATIENT
Start: 2021-07-31 | End: 2021-08-10

## 2021-07-31 RX ORDER — METHYLPREDNISOLONE 4 MG
TABLET, DOSE PACK ORAL
Qty: 21 TABLET | Refills: 0 | Status: SHIPPED | OUTPATIENT
Start: 2021-07-31 | End: 2022-03-28

## 2021-07-31 ASSESSMENT — MIFFLIN-ST. JEOR: SCORE: 1464.31

## 2021-07-31 NOTE — PROGRESS NOTES
"SUBJECTIVE:Yumiko Marquez is a 30 year old female who presents to the clinic today for a rash.  Onset of rash was day(s) ago.   Rash is still present.   Location of the rash: generalized.  Associated symptoms include: itching.    Recent exposure history: camping    Past Medical History:   Diagnosis Date     Cervical high risk HPV (human papillomavirus) test positive 02/25/2021 02/25/21     Complication of anesthesia      History of ITP 1995     PONV (postoperative nausea and vomiting)      No Known Allergies  Social History     Tobacco Use     Smoking status: Never Smoker     Smokeless tobacco: Never Used   Substance Use Topics     Alcohol use: Yes       ROS:CONSTITUTIONAL:NEGATIVE for fever    EXAM: VITALS: /68   Pulse 63   Temp 97.6  F (36.4  C)   Resp 16   Ht 1.702 m (5' 7\")   Wt 71.7 kg (158 lb)   SpO2 100%   BMI 24.75 kg/m    General:healthy,alert,no distress    Location: generalized     Distribution: diffuse/patchy     Lesion grouping: bilateral     Lesion type: macular     Color: red and weepy with no other findingsPERTINENT EXAM: GENERAL APPEARANCE: healthy, alert and no distress      ICD-10-CM    1. Rash  R21 methylPREDNISolone (MEDROL DOSEPAK) 4 MG tablet therapy pack     triamcinolone (KENALOG) 0.1 % external cream       Follow-up with primary clinic if not improving    "

## 2021-10-10 ENCOUNTER — HEALTH MAINTENANCE LETTER (OUTPATIENT)
Age: 31
End: 2021-10-10

## 2021-12-06 ENCOUNTER — TELEPHONE (OUTPATIENT)
Dept: ENDOCRINOLOGY | Facility: CLINIC | Age: 31
End: 2021-12-06
Payer: COMMERCIAL

## 2021-12-06 DIAGNOSIS — C73 PAPILLARY THYROID CARCINOMA (H): Primary | ICD-10-CM

## 2021-12-06 NOTE — TELEPHONE ENCOUNTER
[facility] Call Center    Phone Message    May a detailed message be left on voicemail: yes     Reason for Call: Order(s): Other:   Reason for requested: labs  Date needed: January 2022  Provider name: Allison Rich isn't sure if provider wants labs done prior to appt.  Please send a [employer] message if needed prior to appt.       Action Taken: Message routed to:  [facility] ([facility]): endo    Travel Screening: Not Applicable

## 2021-12-09 NOTE — TELEPHONE ENCOUNTER
I ordered thyroid US and thyroid labs.    Alfa Gutierrez MD  Endocrinology, Diabetes and Metabolism  [facility]

## 2021-12-10 ENCOUNTER — ANCILLARY PROCEDURE (OUTPATIENT)
Dept: ULTRASOUND IMAGING | Facility: CLINIC | Age: 31
End: 2021-12-10
Attending: INTERNAL MEDICINE
Payer: COMMERCIAL

## 2021-12-10 DIAGNOSIS — C73 PAPILLARY THYROID CARCINOMA (H): ICD-10-CM

## 2021-12-10 PROCEDURE — 76536 US EXAM OF HEAD AND NECK: CPT | Mod: GC | Performed by: RADIOLOGY

## 2021-12-10 NOTE — TELEPHONE ENCOUNTER
My chart messages sent with numbers to call to schedule labs and U/S for upcoming appointment Inessa Conde RN on 12/10/2021 at 10:54 AM

## 2022-01-04 ENCOUNTER — TELEPHONE (OUTPATIENT)
Dept: ENDOCRINOLOGY | Facility: CLINIC | Age: 32
End: 2022-01-04
Payer: COMMERCIAL

## 2022-01-04 NOTE — TELEPHONE ENCOUNTER
CCs notified to please reschedule.   Currently scheduled with Dr Cooper 01/12/2022 as new.    Papillary thyroid carcinoma   Dr Cooper entered lab orders.   Lesli White RN on 1/4/2022 at 12:37 PM

## 2022-01-05 NOTE — TELEPHONE ENCOUNTER
LVM for pt to call back to reschedule, okay to offer 20 min virtual appt on Dr العلي's Friday schedule

## 2022-01-05 NOTE — TELEPHONE ENCOUNTER
Messages left to see Dr العلي Friday as well as a my chart message sent. Unable to reach her waiting for a call back . Dr Fonseca  Is out so cannot keep the 1/12/22 appointment Inessa Conde RN on 1/5/2022 at 10:10 AM   .

## 2022-02-08 ENCOUNTER — LAB (OUTPATIENT)
Dept: LAB | Facility: CLINIC | Age: 32
End: 2022-02-08
Payer: COMMERCIAL

## 2022-02-08 DIAGNOSIS — C73 PAPILLARY THYROID CARCINOMA (H): ICD-10-CM

## 2022-02-08 LAB
T4 FREE SERPL-MCNC: 0.98 NG/DL (ref 0.76–1.46)
TSH SERPL DL<=0.005 MIU/L-ACNC: 1.38 MU/L (ref 0.4–4)

## 2022-02-08 PROCEDURE — 84432 ASSAY OF THYROGLOBULIN: CPT | Mod: 90 | Performed by: PATHOLOGY

## 2022-02-08 PROCEDURE — 84443 ASSAY THYROID STIM HORMONE: CPT | Performed by: PATHOLOGY

## 2022-02-08 PROCEDURE — 36415 COLL VENOUS BLD VENIPUNCTURE: CPT | Performed by: PATHOLOGY

## 2022-02-08 PROCEDURE — 84439 ASSAY OF FREE THYROXINE: CPT | Performed by: PATHOLOGY

## 2022-02-08 PROCEDURE — 86800 THYROGLOBULIN ANTIBODY: CPT | Mod: 90 | Performed by: PATHOLOGY

## 2022-02-08 PROCEDURE — 99000 SPECIMEN HANDLING OFFICE-LAB: CPT | Performed by: PATHOLOGY

## 2022-02-09 ENCOUNTER — PATIENT OUTREACH (OUTPATIENT)
Dept: FAMILY MEDICINE | Facility: CLINIC | Age: 32
End: 2022-02-09
Payer: COMMERCIAL

## 2022-02-09 PROBLEM — R87.810 CERVICAL HIGH RISK HPV (HUMAN PAPILLOMAVIRUS) TEST POSITIVE: Status: ACTIVE | Noted: 2021-02-25

## 2022-02-09 LAB
THYROGLOB AB SERPL IA-ACNC: <20 IU/ML
THYROGLOB SERPL-MCNC: 19.6 NG/ML

## 2022-02-09 NOTE — LETTER
February 9, 2022      Yumiko Marquez  [address]  [place] [address]        Dear ,    This letter is to remind you that you are due for your follow-up Pap smear and Human Papillomavirus (HPV) test.    Please call [phone] to schedule your appointment at your earliest convenience.    If you have completed the appointment outside of the [facility] system, please have the records forwarded to our office. We will update your chart for your provider to review before your next annual wellness visit.     Thank you for choosing [facility]!      Sincerely,    Your [facility] Care Team

## 2022-02-10 NOTE — PROGRESS NOTES
Yumiko is a 31 year old who is being evaluated via a billable video visit.      How would you like to obtain your AVS? MyChart  If the video visit is dropped, the invitation should be resent by: Send to e-mail at: [email or web address]  Will anyone else be joining your video visit? No    Video visit astarted at 9:37, stopped at 1010, documentation time, chart review, coordination of care, 10 minutes total time spent day of encounter 40 minutes    HPI  #1  Multifocal Papillary thyroid cancer (T3, N0, Mx) , status post right hemithyroidectomy and isthumusectomy in November 2019-4.3 cm in size with 10% possibly poorly differentiated  In 2019, the pt had  An ultrasound thyroid demonstrated multiple nodules in the right thyroid lobe, the largest was 3.7cm. 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  She underwent right thyroidectomy and isthmusectomy by Dr. Camargo on 11/26/2019.  Surgical pathology showed 4.3 cm papillary thyroid cancer, unifocal with clear margin.  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 The papillary thyroid cancer was well-circumscribed and encapsulated with predominantly follicular growth pattern.  However the multifocal growth of tall cells and a oncocytic changes and mitosis is focally increased up to 5 per 10 high-power fields.  The findings are consistent with focal poorly differentiated thyroid carcinoma of 10% however there was no capsular or angiolymphatic invasion.  Therefore this tumor is most likely not behave in an aggressive fashion.  No lymph node was removed.  Tumor staging pT3aNx.   She was seen by endocrinology in 2020-completion thyroidectomy was recommended however the patient had deferred due to concerns about need for lifelong levothyroxine replacement.    Interval history: The patient did not have completion thyroidectomy (patient preference).  The patient did not have radioactive iodine.  February 2022 neck ultrasound shows  possibly enlarging nodule on the left side (around 5 mm).  Of note, the patient is very concerned about having a repeat FNA, as the previous biopsy was very painful and slightly traumatic for her.  In addition, the patient would like to keep her left thyroid as much as possible.  February 2022 TSH each normal, thyroglobulin around 19.6 (still has remaining left thyroid)    Past Medical History  Past Medical History:   Diagnosis Date     Cervical high risk HPV (human papillomavirus) test positive 02/25/2021 02/25/21     Complication of anesthesia      History of ITP 1995     PONV (postoperative nausea and vomiting)        Allergies  No Known Allergies  Medications  Current Outpatient Medications   Medication Sig Dispense Refill     methylPREDNISolone (MEDROL DOSEPAK) 4 MG tablet therapy pack Follow Package Directions 21 tablet 0     Family History  family history includes Heart Disease in her maternal grandfather; Melanoma (age of onset: 60) in her maternal grandmother; Thyroid Disease in her paternal aunt; Thyroid Disease (age of onset: 45) in her maternal grandfather; Valvular heart disease in her maternal grandfather.  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 with 1 roomate     Social Determinants of Health     Financial Resource Strain: Not on file   Food Insecurity: Not on file   Transportation Needs: Not on file   Physical  "Activity: Not on file   Stress: Not on file   Social Connections: Not on file   Intimate Partner Violence: Not on file   Housing Stability: Not on file       ROS  Per HPI      Physical Exam  GENERAL: Healthy, alert and no distress\",\"EYES: Eyes grossly normal to inspection.  No discharge or erythema, or obvious scleral/conjunctival abnormalities.\",\"RESP: No audible wheeze, cough, or visible cyanosis.  No visible retractions or increased work of breathing.  \",\"SKIN: Visible skin clear. No significant rash, abnormal pigmentation or lesions.\",\"NEURO: Cranial nerves grossly intact.  Mentation and speech appropriate for age.\",\"PSYCH: Mentation appears normal, affect normal/bright, judgement and insight intact, normal speech and appearance well-groomed.      RESULTS  Right:  Level 2: Hypoechoic lymph node measuring 1.8 x 1.0 x 2.9 cm.  Patient Name: CYNTHIA ALEJANDRO   MR#: [id number]   Specimen #: [id number]   Collected: 11/26/2019   Received: 11/26/2019   Reported: 12/3/2019 18:17   Ordering Phy(s): JIGNESH POND     For improved result formatting, select 'View Enhanced Report Format' under    Linked Documents section.     +++Original Report Follows Addendum+++     TO ORIGINAL REPORT   Status: Signed Out   Date Ordered:12/3/2019   Date Reported:12/3/2019 18:31   Signed Out By: Mago Sadler M.D., PhD, [facility]     INTERPRETATION:   One perithyroidal lymph node is identified and negative for malignancy.     Therefore, the tumor staging should   be pT3aN0 rather than pT3aNx.     ORIGINAL REPORT:     SPECIMEN(S):   Thyroid, right lobe and isthmus     FINAL DIAGNOSIS:   Thyroid, right lobe and isthmus, hemithyroidectomy:   - Papillary thyroid carcinoma, mixed types (see comment)        - Tumor focality: Unifocal        - Tumor laterality: Right lobe        - Largest tumor size: 4.2 cm        - Margins: Uninvolved by carcinoma        - Lymphatic invasion: Not identified        - Perineural invasion: Not identified       "  - Extrathyroidal extension: Not identified   - Follicular adenoma (1.6 cm)     COMMENT:   Sections reveal that the tumor is well circumscribed and encapsulated with    a predominantly follicular growth   pattern.  There are multifocal papillary growths with tall cells and   oncocytic changes.  There are also solid   areas.  No definite necrosis is identified.  Mitosis is focally increased   up to five per 10 high-power fields.    These findings are consistent with focal poorly differentiated thyroid   carcinoma of approximately 10%.   However, no capsular or angiolymphatic invasion is identified.  Therefore,    this tumor will most likely not   behave in an aggressive fashion.     Immunostains with appropriate controls were performed.  Beta-Catenin   highlights cell membrane without apparent   nuclear positivity.  Ki-67 labeling index varies from 5-10%.  No   expression of HBME1 and CDX2 is identified in   the tumor cells.  These findings support the above diagnosis.     This case received intradepartmental consultation.     Report Name: Thyroid Gland - Resection        Status: Submitted Checklist Inst: 1      Last Updated By: Mago Sadler M.D., PhD, [facility], 12/03/2019   18:11:53   Part(s) Involved:   A: Thyroid, right lobe and isthmus     Synoptic Report:     CLINICAL     Clinical History:         - No known radiation exposure     SPECIMEN     Procedure:         - Right lobectomy with isthmusectomy (hemithyroidectomy)     TUMOR     Tumor Focality:         - Unifocal     Multiple Tumors of the Same Lineage       Tumors of the Same Cellular Lineage         Tumor Site:             - Right lobe         Histologic Type:             - Papillary carcinoma variant - mixed types with tall cells,   oncocytic             and solid variants         Tumor Size: 4.2 cm         Extrathyroidal Extension:             - Not identified         Angioinvasion (vascular invasion):             - Not identified         Lymphatic  "Invasion:             - Not identified         Margins:             - Uninvolved by carcinoma     LYMPH NODES     Regional Lymph Nodes:         - No lymph nodes submitted or found     PATHOLOGIC STAGE CLASSIFICATION (PTNM, AJCC 8TH EDITION)     Primary Tumor (pT):         - pT3a     Regional Lymph Nodes (pN):         - pNX     [facility] February 2019 Annual Release     I have personally reviewed all specimens and/or slides, including the   listed special stains, and used them   with my medical judgement to determine or confirm the final diagnosis.     Electronically signed out by:     Mago Sadler M.D., PhD, [facility]     CLINICAL HISTORY:   29 year old female with thyroid nodule.     GROSS:   The specimen is received in formalin with proper patient identification,   labeled \"right thyroid lobe and   isthmus\".  The specimen consists of 24.2 g intact right thyroid lobectomy   (5.4 cm superior-inferior by 3.8 cm   medial lateral by 3.1 cm anterior-posterior).  Specimen is differentially   inked as follows: Anterior - blue,   posteriorblack, and isthmus resection margin - orange.  The thyroid   capsule is tan-purple, smooth and intact.   Sectioning reveals a tan-white, soft, homogenous, encapsulated,   well-circumscribed mass (4.2 cm   superior-inferior by 3.1 cm medial lateral by 2.7 cm anterior-posterior)   that grossly abuts the anterior and   posterior thyroid capsule, and is 0.4 cm from the isthmus resection   margin.  Also identified within the   specimen is a tan-brown, homogenous, semifirm well-circumscribed   nonencapsulated mass (1.6 anterior-posterior   by 0.9 cm medial lateral that grossly abuts the anterior and posterior   thyroid capsule and mass.  The grossly   uninvolved parenchyma is tan-brown, soft, homogenous and unremarkable.  No    parathyroid glands are grossly   identified.  Representative sections are submitted as per summary of   sections: (Mass capsule entirely   submitted)     Summary of " Sections:   A1 - left anterior capsule   A2 - mass to posterior capsule   A3 - mass to isthmus resection margin   A4-A14 - larger mass capsule is entirely   A15-A18 - smaller mass, entirely   [id number] - remainder of mass   [id number] - remainder of thyroid     (Dictated by: Yoav GARCIA 11/27/2019 12:15 PM)     MICROSCOPIC:   Microscopic examination was performed.     The technical component of this testing was completed at the [facility], with the professional component performed    at the [facility], [address] [place] [id number] ([phone])     CPT Codes:   A: 15714-CP1, 77035-AEA, 74369-HDP, 37823-IZX, 29319-BFE     COLLECTION SITE:   Client: [facility]   Location: OR (B)         Lab on 02/08/2022   Component Date Value Ref Range Status     TSH 02/08/2022 1.38  0.40 - 4.00 mU/L Final     Free T4 02/08/2022 0.98  0.76 - 1.46 ng/dL Final     Thyroglobulin Antibody 02/08/2022 <20  <40 IU/mL Final     Thyroglobulin by Immunoassay 02/08/2022 19.6  <55.0 ng/mL Final       US THYROID, HEAD AND NECK SOFT TISSUE ULTRASOUND 12/10/2021 12:48 PM     COMPARISON: Thyroid ultrasound 1/7/2021, head and neck soft tissue  ultrasound 1/7/2021     HISTORY: Papillary thyroid carcinoma (H)     FINDINGS:   Right hemithyroidectomy changes.     Thyroid parenchyma: homogenous and diffusely hyperemic.  The left lobe of the thyroid measures: 1.8 x 1.2 x 5.5 cm.  The thyroid isthmus measures: 0.2 cm (measured by interpreting  radiologist).     Nodule 1:  Lobe: Left  Location: Superior  Size: 0.4 x 0.3 x 0.5 cm (previously 0.3 x 0.2 x 0.2 cm  retrospectively on 1/7/2021 cines).  Composition: Solid or almost completely solid (2 points)  Echogenicity: Hypoechoic (2 points)  Shape: Wider than tall (0 points)  Margin: Smooth (0 points)  Echogenic Foci: None or  large comet tail artifact (0 points)  Stability: Minimally enlarged   TIRADS: TR4 (4-6 points)      Lymph nodes are measured bilaterally with measurements given in  transverse x AP x craniocaudal dimensions as follows:     Right:  Level 2: 1.8 x 0.7 x 2.3 cm hypoechoic lymph node with preserved fatty  hilum (previously 1.8 x 0.7 x 2.5 cm).  Level 3: None large enough by size criteria  Level 4: None large enough by size criteria  Level 5:      #1: 0.5 x 0.4 x 0.9 cm lymph node with preserved fatty hilum  (previously 0.5 x 0.4 cm).     #2 (labeled 3 on sonographer sheet): 0.8 x 0.5 x 1.4 cm hypoechoic  lymph node without discrete fatty hilum (largely unchanged  retrospectively on prior cines).     #3 (labeled 4 on sonographer sheet): 0.8 x 0.4 x 1.2 cm lymph node  with preserved fatty hilum (previously 0.8 x 0.4 cm retrospectively on  prior).     Level 6: None  Level 7: None     Left:  Level 2:      #1: 1.1 x 0.8 x 1.7 cm lymph node with preserved fatty hilum  (previously 1.3 x 0.6 x 2.3 cm).     #2: 1.1 x 0.5 x 1.2 cm lymph node with preserved fatty hilum  (previously 1.1 x 0.5 x 1.1 cm and labeled #3).     #3: 1.1 x 0.6 x 1.6 cm lymph node with preserved fatty hilum  (previously 1.2 x 0.6 x 1.9 cm and labeled #2).  Level 3: None large enough by size criteria  Level 4: 1.1 x 0.9 x 1.2 cm lymph node with preserved fatty hilum  (previously 0.8 x 0.6 x 1.2 cm).  Level 5: None large enough by size criteria  Level 6: None  Level 7: 0.3 x 0.2 x 0.5 cm lymph node with preserved fatty hilum  (previously 0.2 x 0.1 x 0.2 cm).                                                                         Impression:  1. Right hemithyroidectomy with retrospectively minimally enlarged 5  mm TR4 nodule in the left thyroid lobe, as detailed above,  necessitating no specific follow-up per ACR TI-RADS recommendations  below, though this can be reassessed on subsequent surveillance  ultrasounds.     1A. Unchanged nonspecific hyperemia of the  residual thyroid  parenchyma.  2. Bilateral cervical lymph nodes as detailed above. No new suspicious  lymphadenopathy.        ACR TI-RADS recommendations  TR2 (2 points) & TR1 (0 points) -No FNA or follow-up  TR3 (3 points) - FNA if ? 2.5cm, follow-up if 1.5 -2.4 cm in 1, 3 and  5 years  TR4 (4-6 points) - FNA if ? 1.5cm, follow-up if 1 -1.4 cm in 1, 2, 3  and 5 years  TR5 (?7 points) - FNA if ? 1cm, follow-up if 0.5 -0.9 cm every year  for 5 years      I have personally reviewed the examination and initial interpretation  and I agree with the findings.     DAYSI MIRANDA, DO     ASSESSMENT:    #1  Multifocal Papillary thyroid cancer (T3, N0, Mx) , status post right hemithyroidectomy and isthumusectomy in November 2019-4.3 cm in size with 10% possibly poorly differentiated  Extensive discussion with patient about options.  Certainly, the most important step, removal of the noted nodule, was performed.  I do think that the patient may benefit from a completion thyroidectomy due to the noted focal area of possibly poorly differentiated carcinoma.  In addition, there is also an increased risk for multifocal disease.  Moreover, there is a thyroid nodule on the left side which is slightly increased in size (0.3 previously, now 0.5 cm).  I did offer the possibility of biopsying the enlarging nodule on the left side and she has been quite reluctant to consider the FNA, given that her previous experience was somewhat traumatic for her.    I did discuss with her that the most cautious approach would be completion thyroidectomy with radioactive iodine.  However, the patient would like to preserve her remaining thyroid is much as possible concerns about being dependent on thyroid hormone the rest of her life.  An alternative approach would be repeat ultrasound with repeat FNA (however patient finds FNAs traumatic), moreover, I am less inclined toward this approach given the noted area of dedifferentiation on the  pathology.    After extensive discussion with patient, I think she would be better served by a second opinion with regards to this thyroid cancer on the right side, especially rereview of the poorly differentiated area.  I think she would be especially best served by referral to [facility] to a thyroid cancer specialist.  She will look into her insurance coverage.  I will see if a referral can be generated.

## 2022-02-11 ENCOUNTER — VIRTUAL VISIT (OUTPATIENT)
Dept: ENDOCRINOLOGY | Facility: CLINIC | Age: 32
End: 2022-02-11
Payer: COMMERCIAL

## 2022-02-11 DIAGNOSIS — C73 PAPILLARY THYROID CARCINOMA (H): Primary | ICD-10-CM

## 2022-02-11 PROCEDURE — 99215 OFFICE O/P EST HI 40 MIN: CPT | Mod: 95 | Performed by: INTERNAL MEDICINE

## 2022-02-11 NOTE — LETTER
2/11/2022       RE: Yumiko Marquez  [address]  [facility] [address]     Dear Colleague,    Thank you for referring your patient, Yumiko Marquez, to the [facility]. Please see a copy of my visit note below.    Yumiko is a 31 year old who is being evaluated via a billable video visit.      How would you like to obtain your AVS? MyChart  If the video visit is dropped, the invitation should be resent by: Send to e-mail at: [email or web address]  Will anyone else be joining your video visit? No    Video visit astarted at 9:37, stopped at 1010, documentation time, chart review, coordination of care, 10 minutes total time spent day of encounter 40 minutes    HPI  #1  Multifocal Papillary thyroid cancer (T3, N0, Mx) , status post right hemithyroidectomy and isthumusectomy in November 2019-4.3 cm in size with 10% possibly poorly differentiated  In 2019, the pt had  An ultrasound thyroid demonstrated multiple nodules in the right thyroid lobe, the largest was 3.7cm. 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  She underwent right thyroidectomy and isthmusectomy by Dr. Camargo on 11/26/2019.  Surgical pathology showed 4.3 cm papillary thyroid cancer, unifocal with clear margin.  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 The papillary thyroid cancer was well-circumscribed and encapsulated with predominantly follicular growth pattern.  However the multifocal growth of tall cells and a oncocytic changes and mitosis is focally increased up to 5 per 10 high-power fields.  The findings are consistent with focal poorly differentiated thyroid carcinoma of 10% however there was no capsular or angiolymphatic invasion.  Therefore this tumor is most likely not behave in an aggressive fashion.  No lymph node was removed.  Tumor staging pT3aNx.   She was seen by endocrinology in  2020-completion thyroidectomy was recommended however the patient had deferred due to concerns about need for lifelong levothyroxine replacement.    Interval history: The patient did not have completion thyroidectomy (patient preference).  The patient did not have radioactive iodine.  February 2022 neck ultrasound shows possibly enlarging nodule on the left side (around 5 mm).  Of note, the patient is very concerned about having a repeat FNA, as the previous biopsy was very painful and slightly traumatic for her.  In addition, the patient would like to keep her left thyroid as much as possible.  February 2022 TSH each normal, thyroglobulin around 19.6 (still has remaining left thyroid)    Past Medical History  Past Medical History:   Diagnosis Date     Cervical high risk HPV (human papillomavirus) test positive 02/25/2021 02/25/21     Complication of anesthesia      History of ITP 1995     PONV (postoperative nausea and vomiting)        Allergies  No Known Allergies  Medications  Current Outpatient Medications   Medication Sig Dispense Refill     methylPREDNISolone (MEDROL DOSEPAK) 4 MG tablet therapy pack Follow Package Directions 21 tablet 0     Family History  family history includes Heart Disease in her maternal grandfather; Melanoma (age of onset: 60) in her maternal grandmother; Thyroid Disease in her paternal aunt; Thyroid Disease (age of onset: 45) in her maternal grandfather; Valvular heart disease in her maternal grandfather.  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 with 1 roomate     Social Determinants of Health     Financial Resource Strain: Not on file   Food Insecurity: Not on file   Transportation Needs: Not on file   Physical Activity: Not on file   Stress: Not on file   Social Connections: Not on file   Intimate Partner Violence: Not on file   Housing Stability: Not on file       ROS  Per HPI      Physical Exam  GENERAL: Healthy, alert and no distress\",\"EYES: Eyes grossly normal to inspection.  No discharge or erythema, or obvious scleral/conjunctival abnormalities.\",\"RESP: No audible wheeze, cough, or visible cyanosis.  No visible retractions or increased work of breathing.  \",\"SKIN: Visible skin clear. No significant rash, abnormal pigmentation or lesions.\",\"NEURO: Cranial nerves grossly intact.  Mentation and speech appropriate for age.\",\"PSYCH: Mentation appears normal, affect normal/bright, judgement and insight intact, normal speech and appearance well-groomed.      RESULTS  Right:  Level 2: Hypoechoic lymph node measuring 1.8 x 1.0 x 2.9 cm.  Patient Name: CYNTHIA ALEJANDRO   MR#: [id number]   Specimen #: [id number]   Collected: 11/26/2019   Received: 11/26/2019   Reported: 12/3/2019 18:17   Ordering Phy(s): JIGNESH POND     For improved result formatting, select 'View Enhanced Report Format' under    Linked Documents section.     +++Original Report Follows Addendum+++     TO ORIGINAL REPORT   Status: Signed Out   Date Ordered:12/3/2019   Date Reported:12/3/2019 18:31   Signed Out By: Mago Sadler M.D., PhD, [facility]     INTERPRETATION:   One perithyroidal lymph node is identified and negative for malignancy.     Therefore, the tumor staging should   be pT3aN0 rather than pT3aNx.     ORIGINAL REPORT:     SPECIMEN(S):   Thyroid, right lobe and isthmus     FINAL DIAGNOSIS:   Thyroid, right lobe and " isthmus, hemithyroidectomy:   - Papillary thyroid carcinoma, mixed types (see comment)        - Tumor focality: Unifocal        - Tumor laterality: Right lobe        - Largest tumor size: 4.2 cm        - Margins: Uninvolved by carcinoma        - Lymphatic invasion: Not identified        - Perineural invasion: Not identified        - Extrathyroidal extension: Not identified   - Follicular adenoma (1.6 cm)     COMMENT:   Sections reveal that the tumor is well circumscribed and encapsulated with    a predominantly follicular growth   pattern.  There are multifocal papillary growths with tall cells and   oncocytic changes.  There are also solid   areas.  No definite necrosis is identified.  Mitosis is focally increased   up to five per 10 high-power fields.    These findings are consistent with focal poorly differentiated thyroid   carcinoma of approximately 10%.   However, no capsular or angiolymphatic invasion is identified.  Therefore,    this tumor will most likely not   behave in an aggressive fashion.     Immunostains with appropriate controls were performed.  Beta-Catenin   highlights cell membrane without apparent   nuclear positivity.  Ki-67 labeling index varies from 5-10%.  No   expression of HBME1 and CDX2 is identified in   the tumor cells.  These findings support the above diagnosis.     This case received intradepartmental consultation.     Report Name: Thyroid Gland - Resection        Status: Submitted Checklist Inst: 1      Last Updated By: Mago Sadler M.D., PhD, [facility], 12/03/2019   18:11:53   Part(s) Involved:   A: Thyroid, right lobe and isthmus     Synoptic Report:     CLINICAL     Clinical History:         - No known radiation exposure     SPECIMEN     Procedure:         - Right lobectomy with isthmusectomy (hemithyroidectomy)     TUMOR     Tumor Focality:         - Unifocal     Multiple Tumors of the Same Lineage       Tumors of the Same Cellular Lineage         Tumor Site:             -  "Right lobe         Histologic Type:             - Papillary carcinoma variant - mixed types with tall cells,   oncocytic             and solid variants         Tumor Size: 4.2 cm         Extrathyroidal Extension:             - Not identified         Angioinvasion (vascular invasion):             - Not identified         Lymphatic Invasion:             - Not identified         Margins:             - Uninvolved by carcinoma     LYMPH NODES     Regional Lymph Nodes:         - No lymph nodes submitted or found     PATHOLOGIC STAGE CLASSIFICATION (PTNM, AJCC 8TH EDITION)     Primary Tumor (pT):         - pT3a     Regional Lymph Nodes (pN):         - pNX     CAP [facility] February 2019 Annual Release     I have personally reviewed all specimens and/or slides, including the   listed special stains, and used them   with my medical judgement to determine or confirm the final diagnosis.     Electronically signed out by:     Mago Sadler M.D., PhD, [facility]     CLINICAL HISTORY:   29 year old female with thyroid nodule.     GROSS:   The specimen is received in formalin with proper patient identification,   labeled \"right thyroid lobe and   isthmus\".  The specimen consists of 24.2 g intact right thyroid lobectomy   (5.4 cm superior-inferior by 3.8 cm   medial lateral by 3.1 cm anterior-posterior).  Specimen is differentially   inked as follows: Anterior - blue,   posteriorblack, and isthmus resection margin - orange.  The thyroid   capsule is tan-purple, smooth and intact.   Sectioning reveals a tan-white, soft, homogenous, encapsulated,   well-circumscribed mass (4.2 cm   superior-inferior by 3.1 cm medial lateral by 2.7 cm anterior-posterior)   that grossly abuts the anterior and   posterior thyroid capsule, and is 0.4 cm from the isthmus resection   margin.  Also identified within the   specimen is a tan-brown, homogenous, semifirm well-circumscribed   nonencapsulated mass (1.6 anterior-posterior   by 0.9 cm medial lateral that " grossly abuts the anterior and posterior   thyroid capsule and mass.  The grossly   uninvolved parenchyma is tan-brown, soft, homogenous and unremarkable.  No    parathyroid glands are grossly   identified.  Representative sections are submitted as per summary of   sections: (Mass capsule entirely   submitted)     Summary of Sections:   A1 - left anterior capsule   A2 - mass to posterior capsule   A3 - mass to isthmus resection margin   A4-A14 - larger mass capsule is entirely   A15-A18 - smaller mass, entirely   [id number] - remainder of mass   [id number]-C27ZE - remainder of thyroid     (Dictated by: Yoav GARCIA 11/27/2019 12:15 PM)     MICROSCOPIC:   Microscopic examination was performed.     The technical component of this testing was completed at the [facility], with the professional component performed    at the [facility], [address] [place] [phone] ([phone])     CPT Codes:   A: 08855-FI5, 41802-LPK, 52002-GWQ, 43495-ESH, 57676-PDN     COLLECTION SITE:   Client: [facility]   Location: [facility] (B)         Lab on 02/08/2022   Component Date Value Ref Range Status     TSH 02/08/2022 1.38  0.40 - 4.00 mU/L Final     Free T4 02/08/2022 0.98  0.76 - 1.46 ng/dL Final     Thyroglobulin Antibody 02/08/2022 <20  <40 IU/mL Final     Thyroglobulin by Immunoassay 02/08/2022 19.6  <55.0 ng/mL Final       US THYROID, HEAD AND NECK SOFT TISSUE ULTRASOUND 12/10/2021 12:48 PM     COMPARISON: Thyroid ultrasound 1/7/2021, head and neck soft tissue  ultrasound 1/7/2021     HISTORY: Papillary thyroid carcinoma (H)     FINDINGS:   Right hemithyroidectomy changes.     Thyroid parenchyma: homogenous and diffusely hyperemic.  The left lobe of the thyroid measures: 1.8 x 1.2 x 5.5 cm.  The thyroid isthmus measures: 0.2 cm (measured by  interpreting  radiologist).     Nodule 1:  Lobe: Left  Location: Superior  Size: 0.4 x 0.3 x 0.5 cm (previously 0.3 x 0.2 x 0.2 cm  retrospectively on 1/7/2021 cines).  Composition: Solid or almost completely solid (2 points)  Echogenicity: Hypoechoic (2 points)  Shape: Wider than tall (0 points)  Margin: Smooth (0 points)  Echogenic Foci: None or large comet tail artifact (0 points)  Stability: Minimally enlarged   TIRADS: TR4 (4-6 points)      Lymph nodes are measured bilaterally with measurements given in  transverse x AP x craniocaudal dimensions as follows:     Right:  Level 2: 1.8 x 0.7 x 2.3 cm hypoechoic lymph node with preserved fatty  hilum (previously 1.8 x 0.7 x 2.5 cm).  Level 3: None large enough by size criteria  Level 4: None large enough by size criteria  Level 5:      #1: 0.5 x 0.4 x 0.9 cm lymph node with preserved fatty hilum  (previously 0.5 x 0.4 cm).     #2 (labeled 3 on sonographer sheet): 0.8 x 0.5 x 1.4 cm hypoechoic  lymph node without discrete fatty hilum (largely unchanged  retrospectively on prior cines).     #3 (labeled 4 on sonographer sheet): 0.8 x 0.4 x 1.2 cm lymph node  with preserved fatty hilum (previously 0.8 x 0.4 cm retrospectively on  prior).     Level 6: None  Level 7: None     Left:  Level 2:      #1: 1.1 x 0.8 x 1.7 cm lymph node with preserved fatty hilum  (previously 1.3 x 0.6 x 2.3 cm).     #2: 1.1 x 0.5 x 1.2 cm lymph node with preserved fatty hilum  (previously 1.1 x 0.5 x 1.1 cm and labeled #3).     #3: 1.1 x 0.6 x 1.6 cm lymph node with preserved fatty hilum  (previously 1.2 x 0.6 x 1.9 cm and labeled #2).  Level 3: None large enough by size criteria  Level 4: 1.1 x 0.9 x 1.2 cm lymph node with preserved fatty hilum  (previously 0.8 x 0.6 x 1.2 cm).  Level 5: None large enough by size criteria  Level 6: None  Level 7: 0.3 x 0.2 x 0.5 cm lymph node with preserved fatty hilum  (previously 0.2 x 0.1 x 0.2 cm).                                                                          Impression:  1. Right hemithyroidectomy with retrospectively minimally enlarged 5  mm TR4 nodule in the left thyroid lobe, as detailed above,  necessitating no specific follow-up per ACR TI-RADS recommendations  below, though this can be reassessed on subsequent surveillance  ultrasounds.     1A. Unchanged nonspecific hyperemia of the residual thyroid  parenchyma.  2. Bilateral cervical lymph nodes as detailed above. No new suspicious  lymphadenopathy.        ACR TI-RADS recommendations  TR2 (2 points) & TR1 (0 points) -No FNA or follow-up  TR3 (3 points) - FNA if ? 2.5cm, follow-up if 1.5 -2.4 cm in 1, 3 and  5 years  TR4 (4-6 points) - FNA if ? 1.5cm, follow-up if 1 -1.4 cm in 1, 2, 3  and 5 years  TR5 (?7 points) - FNA if ? 1cm, follow-up if 0.5 -0.9 cm every year  for 5 years      I have personally reviewed the examination and initial interpretation  and I agree with the findings.     DAYSI MIRANDA, DO     ASSESSMENT:    #1  Multifocal Papillary thyroid cancer (T3, N0, Mx) , status post right hemithyroidectomy and isthumusectomy in November 2019-4.3 cm in size with 10% possibly poorly differentiated  Extensive discussion with patient about options.  Certainly, the most important step, removal of the noted nodule, was performed.  I do think that the patient may benefit from a completion thyroidectomy due to the noted focal area of possibly poorly differentiated carcinoma.  In addition, there is also an increased risk for multifocal disease.  Moreover, there is a thyroid nodule on the left side which is slightly increased in size (0.3 previously, now 0.5 cm).  I did offer the possibility of biopsying the enlarging nodule on the left side and she has been quite reluctant to consider the FNA, given that her previous experience was somewhat traumatic for her.    I did discuss with her that the most cautious approach would be completion thyroidectomy with radioactive iodine.  However, the patient would  like to preserve her remaining thyroid is much as possible concerns about being dependent on thyroid hormone the rest of her life.  An alternative approach would be repeat ultrasound with repeat FNA (however patient finds FNAs traumatic), moreover, I am less inclined toward this approach given the noted area of dedifferentiation on the pathology.    After extensive discussion with patient, I think she would be better served by a second opinion with regards to this thyroid cancer on the right side, especially rereview of the poorly differentiated area.  I think she would be especially best served by referral to [facility] to a thyroid cancer specialist.  She will look into her insurance coverage.  I will see if a referral can be generated.      Again, thank you for allowing me to participate in the care of your patient.      Sincerely,    Marzena Lagunas MD

## 2022-02-11 NOTE — LETTER
Dr. Aide Scott  [facility]  [address] [place] [address]      RE: Ms.Valerie Alejandro  [address]  [facility] [address]        February 11, 2022    Dear Aide      I would appreciate your independent review of this patient.  In short, she has a 4.3 cm right thyroid cancer that was removed in 2019 by hemithyroidectomy and isthmusectomy (left side remains), without radioactive iodine (since the left-sided remained).  However, the right-sided thyroid cancer has roughly a 10% area of dedifferentiation.  Completion thyroidectomy was recommended by multiple providers.  However the patient is very interested in keeping her left thyroid, and following up with serial ultrasounds.  She would like a better assessment of the risks versus benefit of completion thyroidectomy (with radioactive iodine) versus watchful waiting.     More recently, in December 2021, she had a repeat thyroid ultrasound of the remaining left side which showed an incremental increase in one of the left thyroid nodules (0.3 cm in size, now 0.5 cm in size).  However she has been reluctant to consider biopsy of this noted enlarging nodule, again concerned about risks versus benefits.     As such, I think she would best be served by an expert such as yourself.  If you have any questions, please feel free to let me know.    Regards    Marzena Lagunas MD ,MS     Patient Name: CYNTHIA ALEJANDRO   MR#: [id number]   Specimen #: [id number]   Collected: 11/26/2019   Received: 11/26/2019   Reported: 12/3/2019 18:17   Ordering Phy(s): JIGNESH POND     For improved result formatting, select 'View Enhanced Report Format' under    Linked Documents section.     +++Original Report Follows Addendum+++     TO ORIGINAL REPORT   Status: Signed Out   Date Ordered:12/3/2019   Date Reported:12/3/2019 18:31   Signed Out By: Mago Sadler M.D., PhD, [facility]     INTERPRETATION:   One perithyroidal lymph node is identified and negative for malignancy.      Therefore, the tumor staging should   be pT3aN0 rather than pT3aNx.     ORIGINAL REPORT:     SPECIMEN(S):   Thyroid, right lobe and isthmus     FINAL DIAGNOSIS:   Thyroid, right lobe and isthmus, hemithyroidectomy:   - Papillary thyroid carcinoma, mixed types (see comment)        - Tumor focality: Unifocal        - Tumor laterality: Right lobe        - Largest tumor size: 4.2 cm        - Margins: Uninvolved by carcinoma        - Lymphatic invasion: Not identified        - Perineural invasion: Not identified        - Extrathyroidal extension: Not identified   - Follicular adenoma (1.6 cm)     COMMENT:   Sections reveal that the tumor is well circumscribed and encapsulated with    a predominantly follicular growth   pattern.  There are multifocal papillary growths with tall cells and   oncocytic changes.  There are also solid   areas.  No definite necrosis is identified.  Mitosis is focally increased   up to five per 10 high-power fields.    These findings are consistent with focal poorly differentiated thyroid   carcinoma of approximately 10%.   However, no capsular or angiolymphatic invasion is identified.  Therefore,    this tumor will most likely not   behave in an aggressive fashion.     Immunostains with appropriate controls were performed.  Beta-Catenin   highlights cell membrane without apparent   nuclear positivity.  Ki-67 labeling index varies from 5-10%.  No   expression of HBME1 and CDX2 is identified in   the tumor cells.  These findings support the above diagnosis.     This case received intradepartmental consultation.     Report Name: Thyroid Gland - Resection        Status: Submitted Checklist Inst: 1      Last Updated By: Mago Sadler M.D., PhD, [facility], 12/03/2019   18:11:53   Part(s) Involved:   A: Thyroid, right lobe and isthmus     Synoptic Report:     CLINICAL     Clinical History:         - No known radiation exposure     SPECIMEN     Procedure:         - Right lobectomy with  "isthmusectomy (hemithyroidectomy)     TUMOR     Tumor Focality:         - Unifocal     Multiple Tumors of the Same Lineage       Tumors of the Same Cellular Lineage         Tumor Site:             - Right lobe         Histologic Type:             - Papillary carcinoma variant - mixed types with tall cells,   oncocytic             and solid variants         Tumor Size: 4.2 cm         Extrathyroidal Extension:             - Not identified         Angioinvasion (vascular invasion):             - Not identified         Lymphatic Invasion:             - Not identified         Margins:             - Uninvolved by carcinoma     LYMPH NODES     Regional Lymph Nodes:         - No lymph nodes submitted or found     PATHOLOGIC STAGE CLASSIFICATION (PTNM, AJCC 8TH EDITION)     Primary Tumor (pT):         - pT3a     Regional Lymph Nodes (pN):         - pNX     CAP [facility] February 2019 Annual Release     I have personally reviewed all specimens and/or slides, including the   listed special stains, and used them   with my medical judgement to determine or confirm the final diagnosis.     Electronically signed out by:     Mago Sadler M.D., PhD, [facility]     CLINICAL HISTORY:   29 year old female with thyroid nodule.     GROSS:   The specimen is received in formalin with proper patient identification,   labeled \"right thyroid lobe and   isthmus\".  The specimen consists of 24.2 g intact right thyroid lobectomy   (5.4 cm superior-inferior by 3.8 cm   medial lateral by 3.1 cm anterior-posterior).  Specimen is differentially   inked as follows: Anterior - blue,   posteriorblack, and isthmus resection margin - orange.  The thyroid   capsule is tan-purple, smooth and intact.   Sectioning reveals a tan-white, soft, homogenous, encapsulated,   well-circumscribed mass (4.2 cm   superior-inferior by 3.1 cm medial lateral by 2.7 cm anterior-posterior)   that grossly abuts the anterior and   posterior thyroid capsule, and is 0.4 cm from the " isthmus resection   margin.  Also identified within the   specimen is a tan-brown, homogenous, semifirm well-circumscribed   nonencapsulated mass (1.6 anterior-posterior   by 0.9 cm medial lateral that grossly abuts the anterior and posterior   thyroid capsule and mass.  The grossly   uninvolved parenchyma is tan-brown, soft, homogenous and unremarkable.  No    parathyroid glands are grossly   identified.  Representative sections are submitted as per summary of   sections: (Mass capsule entirely   submitted)     Summary of Sections:   A1 - left anterior capsule   A2 - mass to posterior capsule   A3 - mass to isthmus resection margin   A4-A14 - larger mass capsule is entirely   A15-A18 - smaller mass, entirely   [id number] - remainder of mass   [id number] - remainder of thyroid     (Dictated by: Yova GARCIA 11/27/2019 12:15 PM)     MICROSCOPIC:   Microscopic examination was performed.     The technical component of this testing was completed at the [facility], with the professional component performed    at [facility] [address] [place] [phone] ([phone])     CPT Codes:   A: 84307-NM3, 61529-RZP, 66603-IXR, 41272-WUB, 38288-YMH     COLLECTION SITE:   Client: [facility]   Location: OR (B)

## 2022-02-11 NOTE — NURSING NOTE
Please remover methelPREDnislone off pt chart. Currently not taking any medications.   Taylor Myhre,VF

## 2022-02-14 ENCOUNTER — TELEPHONE (OUTPATIENT)
Dept: ENDOCRINOLOGY | Facility: CLINIC | Age: 32
End: 2022-02-14
Payer: COMMERCIAL

## 2022-02-14 DIAGNOSIS — C73 PAPILLARY THYROID CARCINOMA (H): Primary | ICD-10-CM

## 2022-02-14 NOTE — TELEPHONE ENCOUNTER
Called [facility]- fax number [phone] (fax) and call [phone] to confirm receipt.       ADDENDUM: Pt notified me that she wants to proceed with thyroidectomy and radioactive iodine and doesn't want to go to [place] - referral place for ENT.

## 2022-02-14 NOTE — TELEPHONE ENCOUNTER
FUTURE VISIT INFORMATION      FUTURE VISIT INFORMATION:    Date: 2/15/22    Time: 10:30AM    [place] [facility] [place]  REFERRAL INFORMATION:    Referring provider:  Marzena Lagunas MD    Referring providers clinic:  [facility] [place]     Reason for visit/diagnosis  discuss surgery -- total thyroidectomy     RECORDS REQUESTED FROM:       Clinic name Comments Records Status Imaging Status   [facility] [place]  2/11/22 note from Marzena Lagunas MD Epic    Imaging 12/10/21 US Head Neck and US Thyroid Epic PACS   [facility] 11/26/19 thyroid (Specimen #: [id number]) Epic    [facility] [place]  12/16/19 note from Dr Camargo   11/26/19 Right Thyroidectomy and Isthmusectomy EPIC

## 2022-02-15 ENCOUNTER — VIRTUAL VISIT (OUTPATIENT)
Dept: OTOLARYNGOLOGY | Facility: CLINIC | Age: 32
End: 2022-02-15
Payer: COMMERCIAL

## 2022-02-15 ENCOUNTER — PRE VISIT (OUTPATIENT)
Dept: OTOLARYNGOLOGY | Facility: CLINIC | Age: 32
End: 2022-02-15
Payer: COMMERCIAL

## 2022-02-15 DIAGNOSIS — C73 PAPILLARY THYROID CARCINOMA (H): ICD-10-CM

## 2022-02-15 PROCEDURE — 99207 PR NON-BILLABLE SERV PER CHARTING: CPT | Performed by: SURGERY

## 2022-02-15 NOTE — LETTER
2/15/2022       RE: Yumiko Marquez  [address]  [facility] [address]     Dear Colleague,    Thank you for referring your patient, Yumiko Marquez, to the [facility] [place] at [facility]. Please see a copy of my visit note below.    Not dictated        Again, thank you for allowing me to participate in the care of your patient.      Sincerely,    Olivia Camargo MD

## 2022-02-24 ENCOUNTER — PREP FOR PROCEDURE (OUTPATIENT)
Dept: OTOLARYNGOLOGY | Facility: CLINIC | Age: 32
End: 2022-02-24
Payer: COMMERCIAL

## 2022-02-24 DIAGNOSIS — C73 MALIGNANT NEOPLASM OF THYROID GLAND (H): Primary | ICD-10-CM

## 2022-02-24 RX ORDER — DEXAMETHASONE SODIUM PHOSPHATE 4 MG/ML
10 INJECTION, SOLUTION INTRA-ARTICULAR; INTRALESIONAL; INTRAMUSCULAR; INTRAVENOUS; SOFT TISSUE ONCE
Status: CANCELLED | OUTPATIENT
Start: 2022-02-24 | End: 2022-02-24

## 2022-02-25 ENCOUNTER — TELEPHONE (OUTPATIENT)
Dept: ENDOCRINOLOGY | Facility: CLINIC | Age: 32
End: 2022-02-25
Payer: COMMERCIAL

## 2022-02-25 NOTE — TELEPHONE ENCOUNTER
Talked with patient by phone.  Patient would prefer not to go to [facility] at this time.  She is willing to have pathology at [facility] do a second review of the path slides-I have contacted pathology.  She is most interested in a completion thyroidectomy in early April.  I will let Dr. Camargo know.

## 2022-03-01 ENCOUNTER — E-VISIT (OUTPATIENT)
Dept: URGENT CARE | Facility: CLINIC | Age: 32
End: 2022-03-01
Payer: COMMERCIAL

## 2022-03-01 ENCOUNTER — TELEPHONE (OUTPATIENT)
Dept: ENDOCRINOLOGY | Facility: CLINIC | Age: 32
End: 2022-03-01

## 2022-03-01 DIAGNOSIS — R05.9 COUGH: Primary | ICD-10-CM

## 2022-03-01 PROCEDURE — 99421 OL DIG E/M SVC 5-10 MIN: CPT | Performed by: EMERGENCY MEDICINE

## 2022-03-01 RX ORDER — AZITHROMYCIN 250 MG/1
TABLET, FILM COATED ORAL
Qty: 6 TABLET | Refills: 0 | Status: SHIPPED | OUTPATIENT
Start: 2022-03-01 | End: 2022-03-06

## 2022-03-01 NOTE — PATIENT INSTRUCTIONS
"    Dear Yumiko Marquez    After reviewing your responses, I've been able to diagnose you with \"Bronchitis\" which is a common infection of your lungs. While this is most commonly caused by a virus, the symptoms you have given suggest you should be treated with antibiotics.     I have sent Z-Connor to your pharmacy to treat this infection.     It is important that you take all of your prescribed medication even if your symptoms are improving after a few doses. Taking all of your medicine helps prevent the symptoms from returning.     If your symptoms worsen, you develop chest pain or shortness of breath, fevers over 101, or are not improving in 5 days, please contact your primary care provider for an appointment or visit any of our convenient Walk-in Care or Urgent Care Centers to be seen which can be found on our website here.    Thanks again for choosing us as your health care partner,    Yoav Montez MD    "

## 2022-03-02 DIAGNOSIS — Z11.59 ENCOUNTER FOR SCREENING FOR OTHER VIRAL DISEASES: Primary | ICD-10-CM

## 2022-03-02 NOTE — TELEPHONE ENCOUNTER
Pathology re-reviewed the slides - summary noted below.    ---    Dear Bebo,    I re-reviewed this case and am confident that this tumor had 10% poorly differentiated thyroid carcinoma in a papillary thyroid carcinoma with mixed growth pattern as originally reported. This is different from anaplastic thyroid carcinoma which I did not see or reported. Even without the poorly differentiated area, the patient should have total thyroidectomy. If you want more evidence to convince the patient for total thyroidectomy, you can consider ordering our molecular panel for thyroid cancer. I do not think that another opinion will help. I believe that this case has been discussed internally and also in the thyroid conference. Thanks.    Mago Sadler MD PhD

## 2022-03-10 ENCOUNTER — TELEPHONE (OUTPATIENT)
Dept: ENDOCRINOLOGY | Facility: CLINIC | Age: 32
End: 2022-03-10

## 2022-03-10 NOTE — TELEPHONE ENCOUNTER
----- Message from Shey Enamorado sent at 3/3/2022  3:52 PM CST -----    ----- Message -----  From: Marzena Lagunas MD  Sent: 3/1/2022   8:13 PM CST  To: Clinic [id number]    Can I see the pt on 4/29 at 4:30 pm? Please arrange and let pt know

## 2022-03-15 NOTE — PROGRESS NOTES
[facility]  [address], [address]  [facility] [address]  Phone: [phone]  Primary Provider: Aleta Irene  Pre-op Performing Provider: ALETA IRENE      PREOPERATIVE EVALUATION:  Today's date: 3/28/2022    Yumiko Marquez is a 31 year old female who presents for a preoperative evaluation.    Surgical Information:  Surgery/Procedure: complete thyroidectomy  Surgery Location: [facility]  Surgeon: Olivia Jessica MD  Surgery Date: 4/12/2022  Time of Surgery: 8am  Where patient plans to recover: At home with family  Fax number for surgical facility: Note does not need to be faxed, will be available electronically in Epic.    Type of Anesthesia Anticipated: General    Assessment & Plan     The proposed surgical procedure is considered INTERMEDIATE risk.    Preop general physical exam      Malignant neoplasm of thyroid gland (H)      Starting new medication for hair growth  reviewed need for potassium and renal function checks       Risks and Recommendations:  The patient has the following additional risks and recommendations for perioperative complications:   - No identified additional risk factors other than previously addressed        RECOMMENDATION:  APPROVAL GIVEN to proceed with proposed procedure, without further diagnostic evaluation.              25 minutes spent on the date of the encounter doing chart review, history and exam, documentation and further activities per the note         Subjective     HPI related to upcoming procedure: complete thyroidectomy      Preop Questions 3/28/2022   1. Have you ever had a heart attack or stroke? No   2. Have you ever had surgery on your heart or blood vessels, such as a stent placement, a coronary artery bypass, or surgery on an artery in your head, neck, heart, or legs? No   3. Do you have chest pain with activity? No   4. Do you have a history of  heart failure? No   5. Do you currently have a cold,  bronchitis or symptoms of other infection? UNKNOWN - still recovering has some chest congestion   6. Do you have a cough, shortness of breath, or wheezing? No   7. Do you or anyone in your family have previous history of blood clots? No   8. Do you or does anyone in your family have a serious bleeding problem such as prolonged bleeding following surgeries or cuts? No   9. Have you ever had problems with anemia or been told to take iron pills? No   10. Have you had any abnormal blood loss such as black, tarry or bloody stools, or abnormal vaginal bleeding? No   11. Have you ever had a blood transfusion? No   12. Are you willing to have a blood transfusion if it is medically needed before, during, or after your surgery? Yes   13. Have you or any of your relatives ever had problems with anesthesia? No   14. Do you have sleep apnea, excessive snoring or daytime drowsiness? No   15. Do you have any artifical heart valves or other implanted medical devices like a pacemaker, defibrillator, or continuous glucose monitor? No   16. Do you have artificial joints? No   17. Are you allergic to latex? No   18. Is there any chance that you may be pregnant? No     Health Care Directive:  Patient does not have a Health Care Directive or Living Will: Discussed advance care planning with patient; information given to patient to review.    Preoperative Review of :   reviewed - no record of controlled substances prescribed.      Recently ill with bronchitis  Took a Z pack for 5 days - felt this helped  Now has some persistent chest symptoms.      Review of Systems  CONSTITUTIONAL: NEGATIVE for fever, chills, change in weight  ENT/MOUTH: NEGATIVE for ear, mouth and throat problems  RESP: NEGATIVE for significant cough or SOB  CV: NEGATIVE for chest pain, palpitations or peripheral edema    Patient Active Problem List    Diagnosis Date Noted     Malignant neoplasm of thyroid gland (H) 02/25/2021     Priority: Medium     Removed  surgically  papillary carcinoma  Has had Q6 month serial US - normal  Plans for yearly surveillance         Cervical high risk HPV (human papillomavirus) test positive 02/25/2021     Priority: Medium     02/25/21 NIL Pap, + HR HPV (not 16 or 18). Plan cotest in 1 year due 02/25/22.   03/4/21 Msg left on voice mail to return call or advised patient to review results and recommendations that were released through [facility].  03/09/21 Left message for patient to call back or informed pt to view [facility] message.  03/16/21 Letter sent. Pt didn't return the call or view [facility] message.   02/09/22 Reminder Jogg, Letter  3/28/22 Pre-op appt -- added to visit notes pap due in case it can be done then             Thyroid nodule 10/29/2019     Priority: Medium     Added automatically from request for surgery [id number]       Thyroid enlarged 04/11/2019     Priority: Medium     Strain of left knee 04/08/2019     Priority: Medium     Raynaud's disease without gangrene 04/08/2019     Priority: Medium     Pain of toe of left foot 04/08/2019     Priority: Medium      Past Medical History:   Diagnosis Date     Cervical high risk HPV (human papillomavirus) test positive 02/25/2021 02/25/21     Complication of anesthesia      History of ITP 1995     PONV (postoperative nausea and vomiting)      Past Surgical History:   Procedure Laterality Date     APPENDECTOMY  2001     THYROIDECTOMY Right 11/26/2019    Procedure: Right Thyroidectomy and Isthmusectomy;  Surgeon: Olivia Camargo MD;  Location: [facility] OR     TONSILLECTOMY       Current Outpatient Medications   Medication Sig Dispense Refill     methylPREDNISolone (MEDROL DOSEPAK) 4 MG tablet therapy pack Follow Package Directions 21 tablet 0       No Known Allergies     Social History     Tobacco Use     Smoking status: Never Smoker     Smokeless tobacco: Never Used   Substance Use Topics     Alcohol use: Yes     Comment: very litter        History   Drug Use Unknown        "  Objective     /77   Pulse 60   Temp 98.3  F (36.8  C) (Oral)   Ht 1.725 m (5' 7.91\")   Wt 69.1 kg (152 lb 4.8 oz)   SpO2 99%   BMI 23.22 kg/m      Physical Exam    GENERAL APPEARANCE: healthy, alert and no distress     EYES: EOMI, PERRL     HENT: ear canals and TM's normal and nose and mouth without ulcers or lesions     NECK: no adenopathy, no asymmetry, masses, or scars and thyroid normal to palpation     RESP: lungs clear to auscultation - no rales, rhonchi or wheezes     CV: regular rates and rhythm, normal S1 S2, no S3 or S4 and no murmur, click or rub     ABDOMEN:  soft, nontender, no HSM or masses and bowel sounds normal     MS: extremities normal- no gross deformities noted, no evidence of inflammation in joints, FROM in all extremities.     SKIN: no suspicious lesions or rashes     NEURO: Normal strength and tone, sensory exam grossly normal, mentation intact and speech normal     PSYCH: mentation appears normal. and affect normal/bright     LYMPHATICS: No cervical adenopathy    No results for input(s): HGB, PLT, INR, NA, POTASSIUM, CR, A1C in the last 56199 hours.     Diagnostics:  Labs pending at this time.  Results will be reviewed when available.   No EKG required for low risk surgery (cataract, skin procedure, breast biopsy, etc).    Revised Cardiac Risk Index (RCRI):  The patient has the following serious cardiovascular risks for perioperative complications:   - No serious cardiac risks = 0 points     RCRI Interpretation: 0 points: Class I (very low risk - 0.4% complication rate)           Signed Electronically by: Aleta Holder MD  Copy of this evaluation report is provided to requesting physician.      "

## 2022-03-15 NOTE — PATIENT INSTRUCTIONS
Preparing for Your Surgery  Getting started  A nurse will call you to review your health history and instructions. They will give you an arrival time based on your scheduled surgery time. Please be ready to share:    Your doctor's clinic name and phone number    Your medical, surgical and anesthesia history    A list of allergies and sensitivities    A list of medicines, including herbal treatments and over-the-counter drugs    Whether the patient has a legal guardian (ask how to send us the papers in advance)  Please tell us if you're pregnant--or if there's any chance you might be pregnant. Some surgeries may injure a fetus (unborn baby), so they require a pregnancy test. Surgeries that are safe for a fetus don't always need a test, and you can choose whether to have one.   If you have a child who's having surgery, please ask for a copy of Preparing for Your Child's Surgery.    Preparing for surgery    Within 30 days of surgery: Have a pre-op exam (sometimes called an H&P, or History and Physical). This can be done at a clinic or pre-operative center.  ? If you're having a , you may not need this exam. Talk to your care team.    At your pre-op exam, talk to your care team about all medicines you take. If you need to stop any medicines before surgery, ask when to start taking them again.  ? We do this for your safety. Many medicines can make you bleed too much during surgery. Some change how well surgery (anesthesia) drugs work.    Call your insurance company to let them know you're having surgery. (If you don't have insurance, call [phone].)    Call your clinic if there's any change in your health. This includes signs of a cold or flu (sore throat, runny nose, cough, rash, fever). It also includes a scrape or scratch near the surgery site.    If you have questions on the day of surgery, call your hospital or surgery center.  COVID testing  You may need to be tested for COVID-19 before having  surgery. If so, your surgical team will give you instructions for scheduling this test, separate from your preoperative history and physical.  Eating and drinking guidelines  For your safety: Unless your surgeon tells you otherwise, follow the guidelines below.    Eat and drink as usual until 8 hours before surgery. After that, no food or milk.    Drink clear liquids until 2 hours before surgery. These are liquids you can see through, like water, Gatorade and Propel Water. You may also have black coffee and tea (no cream or milk).    Nothing by mouth within 2 hours of surgery. This includes gum, candy and breath mints.    If you drink alcohol: Stop drinking it the night before surgery.    If your care team tells you to take medicine on the morning of surgery, it's okay to take it with a sip of water.  Preventing infection    Shower or bathe the night before and morning of your surgery. Follow the instructions your clinic gave you. (If no instructions, use regular soap.)    Don't shave or clip hair near your surgery site. We'll remove the hair if needed.    Don't smoke or vape the morning of surgery. You may chew nicotine gum up to 2 hours before surgery. A nicotine patch is okay.  ? Note: Some surgeries require you to completely quit smoking and nicotine. Check with your surgeon.    Your care team will make every effort to keep you safe from infection. We will:  ? Clean our hands often with soap and water (or an alcohol-based hand rub).  ? Clean the skin at your surgery site with a special soap that kills germs.  ? Give you a special gown to keep you warm. (Cold raises the risk of infection.)  ? Wear special hair covers, masks, gowns and gloves during surgery.  ? Give antibiotic medicine, if prescribed. Not all surgeries need antibiotics.  What to bring on the day of surgery    Photo ID and insurance card    Copy of your health care directive, if you have one    Glasses and hearing aides (bring cases)  ? You can't  wear contacts during surgery    Inhaler and eye drops, if you use them (tell us about these when you arrive)    CPAP machine or breathing device, if you use them    A few personal items, if spending the night    If you have . . .  ? A pacemaker, ICD (cardiac defibrillator) or other implant: Bring the ID card.  ? An implanted stimulator: Bring the remote control.  ? A legal guardian: Bring a copy of the certified (court-stamped) guardianship papers.  Please remove any jewelry, including body piercings. Leave jewelry and other valuables at home.  If you're going home the day of surgery    You must have a responsible adult drive you home. They should stay with you overnight as well.    If you don't have someone to stay with you, and you aren't safe to go home alone, we may keep you overnight. Insurance often won't pay for this.  After surgery  If it's hard to control your pain or you need more pain medicine, please call your surgeon's office.  Questions?   If you have any questions for your care team, list them here: _________________________________________________________________________________________________________________________________________________________________________ ____________________________________ ____________________________________ ____________________________________  For informational purposes only. Not to replace the advice of your health care provider. Copyright   2003, 2019 [facility]. All rights reserved. Clinically reviewed by Ewa Way MD. [employer] [id number] - REV 07/21.

## 2022-03-26 ENCOUNTER — HEALTH MAINTENANCE LETTER (OUTPATIENT)
Age: 32
End: 2022-03-26

## 2022-03-28 ENCOUNTER — OFFICE VISIT (OUTPATIENT)
Dept: FAMILY MEDICINE | Facility: CLINIC | Age: 32
End: 2022-03-28
Payer: COMMERCIAL

## 2022-03-28 VITALS
TEMPERATURE: 98.3 F | DIASTOLIC BLOOD PRESSURE: 77 MMHG | BODY MASS INDEX: 23.08 KG/M2 | HEIGHT: 68 IN | OXYGEN SATURATION: 99 % | WEIGHT: 152.3 LBS | SYSTOLIC BLOOD PRESSURE: 113 MMHG | HEART RATE: 60 BPM

## 2022-03-28 DIAGNOSIS — C73 MALIGNANT NEOPLASM OF THYROID GLAND (H): ICD-10-CM

## 2022-03-28 DIAGNOSIS — Z01.818 PREOP GENERAL PHYSICAL EXAM: Primary | ICD-10-CM

## 2022-03-28 DIAGNOSIS — R30.0 DYSURIA: ICD-10-CM

## 2022-03-28 DIAGNOSIS — L68.9 EXCESSIVE HAIR GROWTH: ICD-10-CM

## 2022-03-28 LAB
ALBUMIN UR-MCNC: NEGATIVE MG/DL
APPEARANCE UR: CLEAR
BACTERIA #/AREA URNS HPF: ABNORMAL /HPF
BILIRUB UR QL STRIP: NEGATIVE
COLOR UR AUTO: YELLOW
GLUCOSE UR STRIP-MCNC: NEGATIVE MG/DL
HGB UR QL STRIP: ABNORMAL
KETONES UR STRIP-MCNC: NEGATIVE MG/DL
LEUKOCYTE ESTERASE UR QL STRIP: ABNORMAL
NITRATE UR QL: NEGATIVE
PH UR STRIP: 6.5 [PH] (ref 5–7)
RBC #/AREA URNS AUTO: ABNORMAL /HPF
SP GR UR STRIP: 1.02 (ref 1–1.03)
SQUAMOUS #/AREA URNS AUTO: ABNORMAL /LPF
UROBILINOGEN UR STRIP-ACNC: 0.2 E.U./DL
WBC #/AREA URNS AUTO: ABNORMAL /HPF

## 2022-03-28 PROCEDURE — 81001 URINALYSIS AUTO W/SCOPE: CPT | Performed by: FAMILY MEDICINE

## 2022-03-28 PROCEDURE — 99214 OFFICE O/P EST MOD 30 MIN: CPT | Performed by: FAMILY MEDICINE

## 2022-03-28 PROCEDURE — 36415 COLL VENOUS BLD VENIPUNCTURE: CPT | Performed by: FAMILY MEDICINE

## 2022-03-28 PROCEDURE — 80048 BASIC METABOLIC PNL TOTAL CA: CPT | Performed by: FAMILY MEDICINE

## 2022-03-28 RX ORDER — SPIRONOLACTONE 50 MG/1
50 TABLET, FILM COATED ORAL DAILY
Qty: 90 TABLET | Refills: 0 | Status: SHIPPED | OUTPATIENT
Start: 2022-03-28 | End: 2022-07-06

## 2022-03-30 LAB
ANION GAP SERPL CALCULATED.3IONS-SCNC: 7 MMOL/L (ref 3–14)
BUN SERPL-MCNC: 11 MG/DL (ref 7–30)
CALCIUM SERPL-MCNC: 9.9 MG/DL (ref 8.5–10.1)
CHLORIDE BLD-SCNC: 106 MMOL/L (ref 94–109)
CO2 SERPL-SCNC: 24 MMOL/L (ref 20–32)
CREAT SERPL-MCNC: 0.7 MG/DL (ref 0.52–1.04)
GFR SERPL CREATININE-BSD FRML MDRD: >90 ML/MIN/1.73M2
GLUCOSE BLD-MCNC: 90 MG/DL (ref 70–99)
POTASSIUM BLD-SCNC: 4.5 MMOL/L (ref 3.4–5.3)
SODIUM SERPL-SCNC: 137 MMOL/L (ref 133–144)

## 2022-04-03 ENCOUNTER — MYC MEDICAL ADVICE (OUTPATIENT)
Dept: FAMILY MEDICINE | Facility: CLINIC | Age: 32
End: 2022-04-03
Payer: COMMERCIAL

## 2022-04-04 NOTE — TELEPHONE ENCOUNTER
It is great that she does not have any symptoms at this time. I would recommend repeating her UA in 2 weeks to ensure resolution of blood from her urine.  As Dr. River recommended, you may start the spironolactone 1 month after your surgery.     MD Vladimir

## 2022-04-04 NOTE — TELEPHONE ENCOUNTER
FS,  Please see below and advise in SN's absence.  Looks like she is responding to message in lab results.  Please advise.  Thanks,  Conchita Ceballos RN

## 2022-04-08 ENCOUNTER — LAB (OUTPATIENT)
Dept: LAB | Facility: CLINIC | Age: 32
End: 2022-04-08
Attending: SURGERY
Payer: COMMERCIAL

## 2022-04-08 DIAGNOSIS — Z11.59 ENCOUNTER FOR SCREENING FOR OTHER VIRAL DISEASES: ICD-10-CM

## 2022-04-08 PROCEDURE — U0005 INFEC AGEN DETEC AMPLI PROBE: HCPCS | Mod: 90 | Performed by: PATHOLOGY

## 2022-04-08 PROCEDURE — U0003 INFECTIOUS AGENT DETECTION BY NUCLEIC ACID (DNA OR RNA); SEVERE ACUTE RESPIRATORY SYNDROME CORONAVIRUS 2 (SARS-COV-2) (CORONAVIRUS DISEASE [COVID-19]), AMPLIFIED PROBE TECHNIQUE, MAKING USE OF HIGH THROUGHPUT TECHNOLOGIES AS DESCRIBED BY CMS-2020-01-R: HCPCS | Mod: 90 | Performed by: PATHOLOGY

## 2022-04-08 PROCEDURE — 99000 SPECIMEN HANDLING OFFICE-LAB: CPT | Performed by: PATHOLOGY

## 2022-04-09 LAB — SARS-COV-2 RNA RESP QL NAA+PROBE: NEGATIVE

## 2022-04-11 ENCOUNTER — ANESTHESIA EVENT (OUTPATIENT)
Dept: SURGERY | Facility: AMBULATORY SURGERY CENTER | Age: 32
End: 2022-04-11
Payer: COMMERCIAL

## 2022-04-11 NOTE — ANESTHESIA PREPROCEDURE EVALUATION
Anesthesia Pre-Procedure Evaluation    Patient: Yumiko Marquez   MRN: [id number] : 1990        Procedure : Procedure(s):  completion thyroidectomy          Past Medical History:   Diagnosis Date     Cervical high risk HPV (human papillomavirus) test positive 2021     Complication of anesthesia      History of ITP      PONV (postoperative nausea and vomiting)       Past Surgical History:   Procedure Laterality Date     APPENDECTOMY  2001     THYROIDECTOMY Right 2019    Procedure: Right Thyroidectomy and Isthmusectomy;  Surgeon: Olivia Camargo MD;  Location: [facility] OR     TONSILLECTOMY        No Known Allergies   Social History     Tobacco Use     Smoking status: Never Smoker     Smokeless tobacco: Never Used   Substance Use Topics     Alcohol use: Yes     Comment: very litter       Wt Readings from Last 1 Encounters:   22 69.1 kg (152 lb 4.8 oz)        Anesthesia Evaluation   Pt has had prior anesthetic. Type: General.    History of anesthetic complications  - PONV.      ROS/MED HX  ENT/Pulmonary:  - neg pulmonary ROS     Neurologic:  - neg neurologic ROS     Cardiovascular:  - neg cardiovascular ROS     METS/Exercise Tolerance:     Hematologic: Comments: History of ITP      Musculoskeletal:  - neg musculoskeletal ROS     GI/Hepatic:  - neg GI/hepatic ROS     Renal/Genitourinary:  - neg Renal ROS     Endo:     (+) thyroid problem,     Psychiatric/Substance Use:  - neg psychiatric ROS     Infectious Disease:  - neg infectious disease ROS     Malignancy:  - neg malignancy ROS     Other:            Physical Exam    Airway        Mallampati: I   TM distance: > 3 FB   Neck ROM: full   Mouth opening: > 3 cm    Respiratory Devices and Support         Dental  no notable dental history     (+) upper retainer and lower retainer      Cardiovascular   cardiovascular exam normal          Pulmonary   pulmonary exam normal                OUTSIDE LABS:  CBC:   Lab Results   Component  Value Date    HGB 14.2 11/18/2019    HCT 44.3 11/18/2019     BMP:   Lab Results   Component Value Date     03/28/2022    POTASSIUM 4.5 03/28/2022    CHLORIDE 106 03/28/2022    CO2 24 03/28/2022    BUN 11 03/28/2022    CR 0.70 03/28/2022    GLC 90 03/28/2022     COAGS: No results found for: PTT, INR, FIBR  POC:   Lab Results   Component Value Date    HCG Negative 11/26/2019     HEPATIC: No results found for: ALBUMIN, PROTTOTAL, ALT, AST, GGT, ALKPHOS, BILITOTAL, BILIDIRECT, HATTIE  OTHER:   Lab Results   Component Value Date    AMELIA 9.9 03/28/2022    TSH 1.38 02/08/2022    T4 0.98 02/08/2022    T3 101 04/08/2019       Anesthesia Plan    ASA Status:  2   NPO Status:  NPO Appropriate    Anesthesia Type: General.     - Airway: ETT   Induction: Intravenous.   Maintenance: Balanced.        Consents    Anesthesia Plan(s) and associated risks, benefits, and realistic alternatives discussed. Questions answered and patient/representative(s) expressed understanding.     - Discussed: Risks, Benefits and Alternatives for BOTH SEDATION and the PROCEDURE were discussed     - Discussed with:  Patient         Postoperative Care    Pain management: Multi-modal analgesia.   PONV prophylaxis: Ondansetron (or other 5HT-3), Dexamethasone or Solumedrol, Background Propofol Infusion     Comments:                Jordan Reed MD

## 2022-04-12 ENCOUNTER — ANESTHESIA (OUTPATIENT)
Dept: SURGERY | Facility: AMBULATORY SURGERY CENTER | Age: 32
End: 2022-04-12
Payer: COMMERCIAL

## 2022-04-12 ENCOUNTER — HOSPITAL ENCOUNTER (OUTPATIENT)
Facility: AMBULATORY SURGERY CENTER | Age: 32
Discharge: HOME | End: 2022-04-12
Attending: SURGERY
Payer: COMMERCIAL

## 2022-04-12 VITALS
HEIGHT: 67 IN | OXYGEN SATURATION: 94 % | RESPIRATION RATE: 20 BRPM | SYSTOLIC BLOOD PRESSURE: 123 MMHG | BODY MASS INDEX: 23.86 KG/M2 | WEIGHT: 152 LBS | TEMPERATURE: 97.2 F | HEART RATE: 67 BPM | DIASTOLIC BLOOD PRESSURE: 73 MMHG

## 2022-04-12 DIAGNOSIS — Z98.890 STATUS POST SURGERY: Primary | ICD-10-CM

## 2022-04-12 LAB
HCG UR QL: NEGATIVE
INTERNAL QC OK POCT: NORMAL
POCT KIT EXPIRATION DATE: NORMAL
POCT KIT LOT NUMBER: NORMAL
PTH-INTACT SERPL-MCNC: 26 PG/ML (ref 18–80)

## 2022-04-12 PROCEDURE — 60260 REPEAT THYROID SURGERY: CPT | Mod: LT | Performed by: SURGERY

## 2022-04-12 PROCEDURE — 88307 TISSUE EXAM BY PATHOLOGIST: CPT | Mod: 26 | Performed by: PATHOLOGY

## 2022-04-12 PROCEDURE — 88307 TISSUE EXAM BY PATHOLOGIST: CPT | Mod: TC | Performed by: SURGERY

## 2022-04-12 PROCEDURE — 60260 REPEAT THYROID SURGERY: CPT | Mod: LT

## 2022-04-12 RX ORDER — DEXAMETHASONE SODIUM PHOSPHATE 10 MG/ML
10 INJECTION, SOLUTION INTRAMUSCULAR; INTRAVENOUS ONCE
Status: COMPLETED | OUTPATIENT
Start: 2022-04-12 | End: 2022-04-12

## 2022-04-12 RX ORDER — FENTANYL CITRATE 50 UG/ML
INJECTION, SOLUTION INTRAMUSCULAR; INTRAVENOUS PRN
Status: DISCONTINUED | OUTPATIENT
Start: 2022-04-12 | End: 2022-04-12

## 2022-04-12 RX ORDER — OXYCODONE HYDROCHLORIDE 5 MG/1
5-10 TABLET ORAL EVERY 4 HOURS PRN
Qty: 6 TABLET | Refills: 0 | Status: SHIPPED | OUTPATIENT
Start: 2022-04-12 | End: 2022-04-22

## 2022-04-12 RX ORDER — SODIUM CHLORIDE, SODIUM LACTATE, POTASSIUM CHLORIDE, CALCIUM CHLORIDE 600; 310; 30; 20 MG/100ML; MG/100ML; MG/100ML; MG/100ML
INJECTION, SOLUTION INTRAVENOUS CONTINUOUS
Status: DISCONTINUED | OUTPATIENT
Start: 2022-04-12 | End: 2022-04-12 | Stop reason: HOSPADM

## 2022-04-12 RX ORDER — LEVOTHYROXINE SODIUM 112 UG/1
112 TABLET ORAL DAILY
Qty: 30 TABLET | Refills: 3 | Status: SHIPPED | OUTPATIENT
Start: 2022-04-12 | End: 2022-04-22

## 2022-04-12 RX ORDER — FENTANYL CITRATE 50 UG/ML
25 INJECTION, SOLUTION INTRAMUSCULAR; INTRAVENOUS
Status: DISCONTINUED | OUTPATIENT
Start: 2022-04-12 | End: 2022-04-13 | Stop reason: HOSPADM

## 2022-04-12 RX ORDER — LIDOCAINE HYDROCHLORIDE 20 MG/ML
INJECTION, SOLUTION INFILTRATION; PERINEURAL PRN
Status: DISCONTINUED | OUTPATIENT
Start: 2022-04-12 | End: 2022-04-12

## 2022-04-12 RX ORDER — LIDOCAINE 40 MG/G
CREAM TOPICAL
Status: DISCONTINUED | OUTPATIENT
Start: 2022-04-12 | End: 2022-04-12 | Stop reason: HOSPADM

## 2022-04-12 RX ORDER — ACETAMINOPHEN 325 MG/1
975 TABLET ORAL ONCE
Status: COMPLETED | OUTPATIENT
Start: 2022-04-12 | End: 2022-04-12

## 2022-04-12 RX ORDER — GABAPENTIN 300 MG/1
300 CAPSULE ORAL
Status: COMPLETED | OUTPATIENT
Start: 2022-04-12 | End: 2022-04-12

## 2022-04-12 RX ORDER — OXYCODONE HYDROCHLORIDE 5 MG/1
5 TABLET ORAL EVERY 4 HOURS PRN
Status: DISCONTINUED | OUTPATIENT
Start: 2022-04-12 | End: 2022-04-13 | Stop reason: HOSPADM

## 2022-04-12 RX ORDER — FENTANYL CITRATE 50 UG/ML
25 INJECTION, SOLUTION INTRAMUSCULAR; INTRAVENOUS EVERY 5 MIN PRN
Status: DISCONTINUED | OUTPATIENT
Start: 2022-04-12 | End: 2022-04-12 | Stop reason: HOSPADM

## 2022-04-12 RX ORDER — SODIUM CHLORIDE, SODIUM LACTATE, POTASSIUM CHLORIDE, CALCIUM CHLORIDE 600; 310; 30; 20 MG/100ML; MG/100ML; MG/100ML; MG/100ML
INJECTION, SOLUTION INTRAVENOUS CONTINUOUS
Status: DISCONTINUED | OUTPATIENT
Start: 2022-04-12 | End: 2022-04-13 | Stop reason: HOSPADM

## 2022-04-12 RX ORDER — PROPOFOL 10 MG/ML
INJECTION, EMULSION INTRAVENOUS PRN
Status: DISCONTINUED | OUTPATIENT
Start: 2022-04-12 | End: 2022-04-12

## 2022-04-12 RX ORDER — HYDROCODONE BITARTRATE AND ACETAMINOPHEN 5; 325 MG/1; MG/1
1 TABLET ORAL
Status: DISCONTINUED | OUTPATIENT
Start: 2022-04-12 | End: 2022-04-13 | Stop reason: HOSPADM

## 2022-04-12 RX ORDER — LABETALOL HYDROCHLORIDE 5 MG/ML
10 INJECTION, SOLUTION INTRAVENOUS
Status: DISCONTINUED | OUTPATIENT
Start: 2022-04-12 | End: 2022-04-12 | Stop reason: HOSPADM

## 2022-04-12 RX ORDER — PROPOFOL 10 MG/ML
INJECTION, EMULSION INTRAVENOUS CONTINUOUS PRN
Status: DISCONTINUED | OUTPATIENT
Start: 2022-04-12 | End: 2022-04-12

## 2022-04-12 RX ORDER — MEPERIDINE HYDROCHLORIDE 25 MG/ML
12.5 INJECTION INTRAMUSCULAR; INTRAVENOUS; SUBCUTANEOUS
Status: DISCONTINUED | OUTPATIENT
Start: 2022-04-12 | End: 2022-04-13 | Stop reason: HOSPADM

## 2022-04-12 RX ORDER — ONDANSETRON 2 MG/ML
INJECTION INTRAMUSCULAR; INTRAVENOUS PRN
Status: DISCONTINUED | OUTPATIENT
Start: 2022-04-12 | End: 2022-04-12

## 2022-04-12 RX ORDER — ONDANSETRON 2 MG/ML
4 INJECTION INTRAMUSCULAR; INTRAVENOUS EVERY 30 MIN PRN
Status: DISCONTINUED | OUTPATIENT
Start: 2022-04-12 | End: 2022-04-13 | Stop reason: HOSPADM

## 2022-04-12 RX ORDER — ONDANSETRON 4 MG/1
4 TABLET, ORALLY DISINTEGRATING ORAL EVERY 30 MIN PRN
Status: DISCONTINUED | OUTPATIENT
Start: 2022-04-12 | End: 2022-04-13 | Stop reason: HOSPADM

## 2022-04-12 RX ORDER — HYDROMORPHONE HYDROCHLORIDE 1 MG/ML
0.2 INJECTION, SOLUTION INTRAMUSCULAR; INTRAVENOUS; SUBCUTANEOUS EVERY 5 MIN PRN
Status: DISCONTINUED | OUTPATIENT
Start: 2022-04-12 | End: 2022-04-12 | Stop reason: HOSPADM

## 2022-04-12 RX ADMIN — ONDANSETRON 4 MG: 2 INJECTION INTRAMUSCULAR; INTRAVENOUS at 09:00

## 2022-04-12 RX ADMIN — DEXAMETHASONE SODIUM PHOSPHATE 10 MG: 10 INJECTION, SOLUTION INTRAMUSCULAR; INTRAVENOUS at 09:00

## 2022-04-12 RX ADMIN — SODIUM CHLORIDE, SODIUM LACTATE, POTASSIUM CHLORIDE, CALCIUM CHLORIDE: 600; 310; 30; 20 INJECTION, SOLUTION INTRAVENOUS at 07:03

## 2022-04-12 RX ADMIN — OXYCODONE HYDROCHLORIDE 5 MG: 5 TABLET ORAL at 10:17

## 2022-04-12 RX ADMIN — LIDOCAINE HYDROCHLORIDE 60 MG: 20 INJECTION, SOLUTION INFILTRATION; PERINEURAL at 08:52

## 2022-04-12 RX ADMIN — ONDANSETRON 4 MG: 2 INJECTION INTRAMUSCULAR; INTRAVENOUS at 10:46

## 2022-04-12 RX ADMIN — ACETAMINOPHEN 975 MG: 325 TABLET ORAL at 07:03

## 2022-04-12 RX ADMIN — FENTANYL CITRATE 100 MCG: 50 INJECTION, SOLUTION INTRAMUSCULAR; INTRAVENOUS at 08:52

## 2022-04-12 RX ADMIN — PROPOFOL 150 MG: 10 INJECTION, EMULSION INTRAVENOUS at 08:52

## 2022-04-12 RX ADMIN — PROPOFOL 150 MCG/KG/MIN: 10 INJECTION, EMULSION INTRAVENOUS at 08:53

## 2022-04-12 RX ADMIN — Medication 0.5 MG: at 09:42

## 2022-04-12 RX ADMIN — GABAPENTIN 300 MG: 300 CAPSULE ORAL at 07:03

## 2022-04-12 NOTE — OR NURSING
Notfied Dr. Franco due to patient's occasional PVC's s/p complete thyroidectomy. Patient's HR ranging from 60-65. BP is WNL, 120's-70's. Patient does report feeling light headed. No new orders at this time. Will continue to assess.

## 2022-04-12 NOTE — DISCHARGE INSTRUCTIONS
[facility]  Home Care Following Anesthesia  For 24 hours after surgery:  Get plenty of rest.  A responsible adult must stay with you for at least 24 hours after you leave the surgery center.  Do not drive or use heavy equipment.  If you have weakness or tingling, don't drive or use heavy equipment until this feeling goes away.   Do not drink alcohol.   Avoid strenuous or risky activities.  Ask for help when climbing stairs.  You may feel lightheaded.  IF so, sit for a few minutes before standing.  Have someone help you get up.   If you have nausea (feel sick to your stomach): Drink only clear liquids such as apple juice, ginger ale, broth or 7-Up.  Rest may also help.  Be sure to drink enough fluids.  Move to a regular diet as you feel able.   You may have a slight fever.  Call the doctor if your fever is over 100 F (37.7 C) (taken under the tongue) or lasts longer than 24 hours.  You may have a dry mouth, a sore throat, muscle aches or trouble sleeping. These should go away after 24 hours.  Do not make important or legal decisions.   It is recommended to avoid smoking.               Tips for taking pain medications  To get the best pain relief possible, remember these points:  Take pain medications as directed, before pain becomes severe.  Pain medication can upset your stomach: taking it with food may help.  Constipation is a common side effect of pain medication. Drink plenty of  fluids.  Eat foods high in fiber. Take a stool softener if recommended by your doctor or pharmacist.  Do not drink alcohol, drive or operate machinery while taking pain medications.  Ask about other ways to control pain, such as with heat, ice or relaxation.    Tylenol/Acetaminophen Consumption  To help encourage the safe use of acetaminophen, the makers of TYLENOL  have lowered the maximum daily dose for single-ingredient Extra Strength TYLENOL  (acetaminophen) products sold in the [place] from 8 pills  per day (4,000 mg) to 6 pills per day (3,000 mg). The dosing interval has also changed from 2 pills every 4-6 hours to 2 pills every 6 hours.  If you feel your pain relief is insufficient, you may take Tylenol/Acetaminophen in addition to your narcotic pain medication.   Be careful not to exceed 3,000 mg of Tylenol/Acetaminophen in a 24 hour period from all sources.  If you are taking extra strength Tylenol/acetaminophen (500 mg), the maximum dose is 6 tablets in 24 hours.  If you are taking regular strength acetaminophen (325 mg), the maximum dose is 9 tablets in 24 hours.  You were last given 975mg of Tylenol at 7:00 am, you may take Tylenol again at 1:00pm.    Call a doctor for any of the following:  Signs of infection (fever, growing tenderness at the surgery site, a large amount of drainage or bleeding, severe pain, foul-smelling drainage, redness, swelling).  It has been over 8 to 10 hours since surgery and you are still not able to urinate (pass water).  Headache for over 24 hours.  Signs of Covid-19 infection (temperature over 100 degrees, shortness of breath, cough, loss of taste/smell, generalized body aches, persistent headache, chills, sore throat, nausea/vomiting/diarrhea)  Your doctor is:  Dr. Olivia Camargo, ENT Otolaryngology: [phone]                    Or dial [phone] and ask for the resident on call for:  ENT Otolaryngology  For emergency care, call the:  [place] [facility]:  [phone] (TTY for hearing impaired: [phone])

## 2022-04-12 NOTE — ANESTHESIA POSTPROCEDURE EVALUATION
Patient: Yumiko Marquez    Procedure: Procedure(s):  completion thyroidectomy       Anesthesia Type:  General    Note:  Disposition: Outpatient   Postop Pain Control: Uneventful            Sign Out: Well controlled pain   PONV:    Neuro/Psych: Uneventful            Sign Out: Acceptable/Baseline neuro status   Airway/Respiratory: Uneventful            Sign Out: Acceptable/Baseline resp. status   CV/Hemodynamics: Uneventful            Sign Out: Acceptable CV status; No obvious hypovolemia; No obvious fluid overload   Other NRE:    DID A NON-ROUTINE EVENT OCCUR?            Last vitals:  Vitals Value Taken Time   /73 04/12/22 1030   Temp 37.2  C (98.9  F) 04/12/22 1030   Pulse 64 04/12/22 1034   Resp 10 04/12/22 1034   SpO2 97 % 04/12/22 1034   Vitals shown include unvalidated device data.    Electronically Signed By: Quintin Franco MD  April 12, 2022  3:33 PM

## 2022-04-12 NOTE — BRIEF OP NOTE
[facility] [place]    Brief Operative Note    Pre-operative diagnosis: Malignant neoplasm of thyroid gland (H) [C73]  Post-operative diagnosis Same as pre-operative diagnosis    Procedure: Procedure(s):  completion thyroidectomy  Surgeon: Surgeon(s) and Role:     * Olivia Camargo MD - Primary  Anesthesia: General   Estimated Blood Loss: Minimal    Drains: None  Specimens:   ID Type Source Tests Collected by Time Destination   1 : Left lobe thyroid Tissue Thyroid, left SURGICAL PATHOLOGY EXAM Olivia Camargo MD 4/12/2022  9:40 AM      Findings:   None.  Complications: None.  Implants: * No implants in log *

## 2022-04-12 NOTE — ANESTHESIA CARE TRANSFER NOTE
Patient: Yumiko Marquez    Procedure: Procedure(s):  completion thyroidectomy       Diagnosis: Malignant neoplasm of thyroid gland (H) [C73]  Diagnosis Additional Information: No value filed.    Anesthesia Type:   General     Note:    Oropharynx: oropharynx clear of all foreign objects  Level of Consciousness: awake  Oxygen Supplementation: nasal cannula  Level of Supplemental Oxygen (L/min / FiO2): 3  Independent Airway: airway patency satisfactory and stable  Dentition: dentition unchanged  Vital Signs Stable: post-procedure vital signs reviewed and stable  Report to RN Given: handoff report given  Patient transferred to: PACU  Comments: VSS and WNL, comfortable, no PONV, report to Melchor TORRES  Handoff Report: Identifed the Patient, Identified the Reponsible Provider, Reviewed the pertinent medical history, Discussed the surgical course, Reviewed Intra-OP anesthesia mangement and issues during anesthesia, Set expectations for post-procedure period and Allowed opportunity for questions and acknowledgement of understanding      Vitals:  Vitals Value Taken Time   /78 04/12/22 1000   Temp 36.6  C (97.9  F) 04/12/22 1000   Pulse 86 04/12/22 1003   Resp 11 04/12/22 1003   SpO2 99 % 04/12/22 1003   Vitals shown include unvalidated device data.    Electronically Signed By: SAMANTHA Calvillo CRNA  April 12, 2022  10:04 AM

## 2022-04-17 LAB
PATH REPORT.COMMENTS IMP SPEC: NORMAL
PATH REPORT.COMMENTS IMP SPEC: NORMAL
PATH REPORT.FINAL DX SPEC: NORMAL
PATH REPORT.GROSS SPEC: NORMAL
PATH REPORT.MICROSCOPIC SPEC OTHER STN: NORMAL
PATH REPORT.RELEVANT HX SPEC: NORMAL
PHOTO IMAGE: NORMAL

## 2022-04-21 ENCOUNTER — HOSPITAL ENCOUNTER (EMERGENCY)
Facility: CLINIC | Age: 32
Discharge: LEFT BEFORE DISCHARGE | End: 2022-04-22
Payer: COMMERCIAL

## 2022-04-21 ENCOUNTER — NURSE TRIAGE (OUTPATIENT)
Dept: NURSING | Facility: CLINIC | Age: 32
End: 2022-04-21
Payer: COMMERCIAL

## 2022-04-21 VITALS
WEIGHT: 152.03 LBS | BODY MASS INDEX: 23.81 KG/M2 | RESPIRATION RATE: 18 BRPM | TEMPERATURE: 98.6 F | HEART RATE: 85 BPM | SYSTOLIC BLOOD PRESSURE: 127 MMHG | OXYGEN SATURATION: 100 % | DIASTOLIC BLOOD PRESSURE: 63 MMHG

## 2022-04-21 NOTE — TELEPHONE ENCOUNTER
"TRIAGE CALL     Patient And Mom calling   COVID TEST neg  thyroidectomy on 4/12 and started levothyroxine the next day.     REPORTS  Tuesday evening Mild sore throat  She was working on Tuesday since then  Muscle pain.  Abdominal pain 7/10    TODAY   Vomiting this AM X 2  SOB she has been in bed -  rib and shoulder hurt to breath in   Unable to take a deep breath  Left Shoulder pain  7/10 - sharp - when she \"breath on\" .  She has been having night sweats,  Anxiety and  can't get warm    Took Oxycodone 3 hrs  TEMP - mom does believe she she has a temp - reading with forehead TEMP 98.2  Incision clean and not swollen  She is currently on Tyroid medication Take 1 tablet (112 mcg)    Used suppository that helped her with BM.  Zinc supplement in the last 2 days    Patient is able to speak in full long sentences without getting short of breath. - but she is in bed and it hurts to move    Pt s PCP/Clinic:Her PCP   Aleta Holder MD Family Medicine    Per protocol, disposition to ER now    Patient and momverbalized understanding and agrees with plan  Giulia Pate RN Nurse Triage Advisor 3:05 PM 4/21/2022    Reason for Disposition    Chest pain    Pain also in shoulder(s) or arm(s) or jaw (Exception: pain is clearly made worse by movement)    Additional Information    Negative: [1] Major abdominal surgical incision AND [2] wound gaping open AND [3] visible internal organs    Negative: Sounds like a life-threatening emergency to the triager    Negative: [1] Bleeding from incision AND [2] won't stop after 10 minutes of direct pressure    Negative: [1] Widespread rash AND [2] bright red, sunburn-like    Negative: SEVERE chest pain    Negative: [1] Chest pain (or \"angina\") comes and goes AND [2] is happening more often (increasing in frequency) or getting worse (increasing in severity) (Exception: chest pains that last only a few seconds)    Protocols used: POST-OP SYMPTOMS AND [id number], CHEST PAIN-A-AH, POST-OP " INCISION SYMPTOMS-A-AH

## 2022-04-21 NOTE — ED TRIAGE NOTES
Pt had surgery to remove her thyroid on 4/12/22.     Pt is experiencing dyspnea, abdominal pain and a sore throat. She was told by urgent care to come to the ER to rule out a PE?      VSS

## 2022-04-21 NOTE — ED PROVIDER NOTES
ED Provider Note  [facility]      History     Chief Complaint   Patient presents with     Abdominal Pain     Pharyngitis     HPI  Yumiko Marquez is a 31 year old female with a PMH of thyroid cancer S/P thyroidectomy, Raynaud's disease, SP appendectomy, complication of anesthesia and high risk HPV positive who presents to the ED today complaining of pharyngitis and abdominal pain.***    Patient recently underwent a thyroidectomy on 4/12/2022, has been experiencing dyspnea, abdominal pain and sore throat, told by urgent care to come to the the ED to rule out a PE.    Past Medical History  Past Medical History:   Diagnosis Date     Cervical high risk HPV (human papillomavirus) test positive 02/25/2021 02/25/21     Complication of anesthesia      History of ITP 1995     PONV (postoperative nausea and vomiting)      Past Surgical History:   Procedure Laterality Date     APPENDECTOMY  2001     THYROIDECTOMY Right 11/26/2019    Procedure: Right Thyroidectomy and Isthmusectomy;  Surgeon: Olivia Camargo MD;  Location: [facility] OR     THYROIDECTOMY N/A 4/12/2022    Procedure: completion thyroidectomy;  Surgeon: Olivia Camargo MD;  [place] [facility] [place]     TONSILLECTOMY       levothyroxine (SYNTHROID/LEVOTHROID) 112 MCG tablet  oxyCODONE (ROXICODONE) 5 MG tablet  spironolactone (ALDACTONE) 50 MG tablet      No Known Allergies  Family History  Family History   Problem Relation Age of Onset     Thyroid Disease Maternal Grandfather 45     Heart Disease Maternal Grandfather      Valvular heart disease Maternal Grandfather      Thyroid Disease Paternal Aunt         maybe     Melanoma Maternal Grandmother 60     Diabetes No family hx of      Hyperlipidemia No family hx of      Social History   Social History     Tobacco Use     Smoking status: Never Smoker     Smokeless tobacco: Never Used   Substance Use Topics     Alcohol use: Yes     Comment: very litter      Drug use: Never      Past  "medical history, past surgical history, medications, allergies, family history, and social history were reviewed with the patient. No additional pertinent items.       Review of Systems  {Complete vs limited ROS:909025::\"A complete review of systems was performed with pertinent positives and negatives noted in the HPI, and all other systems negative.\"}    Physical Exam   BP: 127/63  Pulse: 85  Temp: 98.6  F (37  C)  Resp: 18  Weight: 69 kg (152 lb 0.5 oz)  SpO2: 100 %  Physical Exam  ***  ED Course      Procedures       {ED Course Selections:828477}              No results found for any visits on 04/21/22.  Medications - No data to display     Assessments & Plan (with Medical Decision Making)   ***    I have reviewed the nursing notes. I have reviewed the findings, diagnosis, plan and need for follow up with the patient.    New Prescriptions    No medications on file       Final diagnoses:   None       --  ***  [facility] EMERGENCY DEPARTMENT  4/21/2022  "

## 2022-04-21 NOTE — OP NOTE
Procedure Date: 04/12/2022    PREOPERATIVE DIAGNOSIS:  Papillary thyroid carcinoma.    POSTOPERATIVE DIAGNOSIS:  Papillary thyroid carcinoma.    SURGICAL PROCEDURE: Completion thyroidectomy.    SURGEON:  Olivia Camargo MD    ASSISTANT:  None.    ANESTHESIA:  General endotracheal with nerve monitoring endotracheal tube.    COMPLICATIONS:  None.    ESTIMATED BLOOD LOSS:  Less than 5 mL    CLINICAL INDICATIONS FOR THE PROCEDURE:  This is a 31-year-old female who had a right thyroid lobectomy in 11/2019 and noted to have a 4.2 cm papillary thyroid carcinoma.  Because the patient had concerns of nodules on the left side as well as the large papillary thyroid carcinoma, it was recommended she have a completion thyroidectomy.  The surgical procedure was discussed with the patient including but not limited to the risks of bleeding, infection, injury to the recurrent laryngeal nerve or nerves, potential permanent hypocalcemia or loss of airway.  The patient is aware of this and agreed to proceed with surgery.  Consent was obtained.  Site was marked.    DESCRIPTION OF PROCEDURE:  The patient was brought to the operating room in stable condition, placed on the operating table in supine position.  After appropriate general anesthesia was obtained, the patient was prepped and draped in sterile fashion.  Timeout was then performed.  The previous incision site was excised.  We then dissected through the platysma.  Subplatysmal planes were then created.  The strap muscles were divided at the midline and retracted laterally.  The superior pole of the left lobe of the thyroid gland was grasped and retracted inferolaterally.  The superior thyroidal vessels were separately skeletonized, clipped and divided with the LigaSure.  We then carried our dissection inferolaterally to identify the middle thyroid vein.  This was also separately skeletonized, clipped and divided.  As we rotated the gland from lateral to medial manner, we  identified the left superior parathyroid gland and dissected it from the undersurface of the thyroid, maintaining its viability.  At this point, we identified the left recurrent laryngeal nerve.  We followed this from a superior to inferior manner, dissecting the thyroid gland off it anteriorly.  We then identified the left inferior parathyroid gland, dissecting it from the undersurface of the thyroid.  The left inferior thyroidal vessels were separately skeletonized, clipped and divided.  We continued rotating the gland from lateral to medial manner, dissecting it over the anterior portion of the trachea.  The specimen was then completely removed and sent for permanent pathologic evaluation.  We did not enter the right neck.  The area was irrigated.  No bleeding was identified.  The left recurrent laryngeal nerve was intact visibly and confirmed to be intact with nerve stimulation.  Left superior and left inferior parathyroid glands were intact and viable at the conclusion of the case.  The strap muscles were then approximated at the midline using a 3-0 chromic interrupted suture.  The platysma was approximated using a 3-0 chromic interrupted suture.  The skin incision was approximated with 5-0 Monocryl running subcuticular suture.  The wound was dressed with Dermabond.  The patient was then extubated and returned to the recovery room in stable condition.  I was present for the entire surgical procedure.    Olivia Camargo MD        D: 2022   T: 2022   MT: marcelino    Name:     CYNTHIA ALEJANDRO  MRN:      [id number]        Account:        [id number]   :      1990           Procedure Date: 2022     Document: [id number]

## 2022-04-22 ENCOUNTER — LAB (OUTPATIENT)
Dept: LAB | Facility: CLINIC | Age: 32
End: 2022-04-22
Payer: COMMERCIAL

## 2022-04-22 ENCOUNTER — VIRTUAL VISIT (OUTPATIENT)
Dept: ENDOCRINOLOGY | Facility: CLINIC | Age: 32
End: 2022-04-22
Payer: COMMERCIAL

## 2022-04-22 ENCOUNTER — TELEPHONE (OUTPATIENT)
Dept: ENDOCRINOLOGY | Facility: CLINIC | Age: 32
End: 2022-04-22

## 2022-04-22 ENCOUNTER — TELEPHONE (OUTPATIENT)
Dept: ENDOCRINOLOGY | Facility: CLINIC | Age: 32
End: 2022-04-22
Payer: COMMERCIAL

## 2022-04-22 DIAGNOSIS — C73 PAPILLARY THYROID CARCINOMA (H): Primary | ICD-10-CM

## 2022-04-22 DIAGNOSIS — C73 PAPILLARY THYROID CARCINOMA (H): ICD-10-CM

## 2022-04-22 DIAGNOSIS — Z11.59 NEED FOR HEPATITIS C SCREENING TEST: Primary | ICD-10-CM

## 2022-04-22 LAB
ANION GAP SERPL CALCULATED.3IONS-SCNC: 8 MMOL/L (ref 3–14)
BASOPHILS # BLD AUTO: 0 10E3/UL (ref 0–0.2)
BASOPHILS NFR BLD AUTO: 0 %
BUN SERPL-MCNC: 5 MG/DL (ref 7–30)
CA-I BLD-MCNC: 4.8 MG/DL (ref 4.4–5.2)
CALCIUM SERPL-MCNC: 9 MG/DL (ref 8.5–10.1)
CHLORIDE BLD-SCNC: 103 MMOL/L (ref 94–109)
CO2 SERPL-SCNC: 28 MMOL/L (ref 20–32)
CREAT SERPL-MCNC: 0.6 MG/DL (ref 0.52–1.04)
EOSINOPHIL # BLD AUTO: 0.1 10E3/UL (ref 0–0.7)
EOSINOPHIL NFR BLD AUTO: 0 %
ERYTHROCYTE [DISTWIDTH] IN BLOOD BY AUTOMATED COUNT: 13.2 % (ref 10–15)
GFR SERPL CREATININE-BSD FRML MDRD: >90 ML/MIN/1.73M2
GLUCOSE BLD-MCNC: 95 MG/DL (ref 70–99)
HCT VFR BLD AUTO: 37.3 % (ref 35–47)
HCV AB SERPL QL IA: NONREACTIVE
HGB BLD-MCNC: 12.2 G/DL (ref 11.7–15.7)
IMM GRANULOCYTES # BLD: 0.1 10E3/UL
IMM GRANULOCYTES NFR BLD: 1 %
LYMPHOCYTES # BLD AUTO: 1.1 10E3/UL (ref 0.8–5.3)
LYMPHOCYTES NFR BLD AUTO: 8 %
MCH RBC QN AUTO: 29.6 PG (ref 26.5–33)
MCHC RBC AUTO-ENTMCNC: 32.7 G/DL (ref 31.5–36.5)
MCV RBC AUTO: 91 FL (ref 78–100)
MONOCYTES # BLD AUTO: 0.5 10E3/UL (ref 0–1.3)
MONOCYTES NFR BLD AUTO: 4 %
NEUTROPHILS # BLD AUTO: 11.4 10E3/UL (ref 1.6–8.3)
NEUTROPHILS NFR BLD AUTO: 87 %
NRBC # BLD AUTO: 0 10E3/UL
NRBC BLD AUTO-RTO: 0 /100
PLATELET # BLD AUTO: 334 10E3/UL (ref 150–450)
POTASSIUM BLD-SCNC: 3.2 MMOL/L (ref 3.4–5.3)
RBC # BLD AUTO: 4.12 10E6/UL (ref 3.8–5.2)
SODIUM SERPL-SCNC: 139 MMOL/L (ref 133–144)
T3 SERPL-MCNC: 54 NG/DL (ref 60–181)
T3FREE SERPL-MCNC: 1.5 PG/ML (ref 2.3–4.2)
T4 FREE SERPL-MCNC: 0.94 NG/DL (ref 0.76–1.46)
TSH SERPL DL<=0.005 MIU/L-ACNC: 6.97 MU/L (ref 0.4–4)
WBC # BLD AUTO: 13.2 10E3/UL (ref 4–11)

## 2022-04-22 PROCEDURE — 84439 ASSAY OF FREE THYROXINE: CPT | Performed by: PATHOLOGY

## 2022-04-22 PROCEDURE — 99000 SPECIMEN HANDLING OFFICE-LAB: CPT | Performed by: PATHOLOGY

## 2022-04-22 PROCEDURE — 80048 BASIC METABOLIC PNL TOTAL CA: CPT | Performed by: PATHOLOGY

## 2022-04-22 PROCEDURE — 82330 ASSAY OF CALCIUM: CPT | Performed by: PATHOLOGY

## 2022-04-22 PROCEDURE — 82306 VITAMIN D 25 HYDROXY: CPT | Mod: 90 | Performed by: PATHOLOGY

## 2022-04-22 PROCEDURE — 36415 COLL VENOUS BLD VENIPUNCTURE: CPT | Performed by: PATHOLOGY

## 2022-04-22 PROCEDURE — 99214 OFFICE O/P EST MOD 30 MIN: CPT | Mod: 95 | Performed by: INTERNAL MEDICINE

## 2022-04-22 PROCEDURE — 86803 HEPATITIS C AB TEST: CPT | Mod: 90 | Performed by: PATHOLOGY

## 2022-04-22 PROCEDURE — 84480 ASSAY TRIIODOTHYRONINE (T3): CPT | Mod: 90 | Performed by: PATHOLOGY

## 2022-04-22 PROCEDURE — 84443 ASSAY THYROID STIM HORMONE: CPT | Performed by: PATHOLOGY

## 2022-04-22 PROCEDURE — 85025 COMPLETE CBC W/AUTO DIFF WBC: CPT | Performed by: PATHOLOGY

## 2022-04-22 RX ORDER — LEVOTHYROXINE SODIUM 125 UG/1
125 TABLET ORAL DAILY
Qty: 90 TABLET | Refills: 3 | Status: SHIPPED | OUTPATIENT
Start: 2022-04-22 | End: 2022-08-12

## 2022-04-22 RX ORDER — NABUMETONE 500 MG/1
500 TABLET, FILM COATED ORAL 2 TIMES DAILY
Qty: 60 TABLET | Refills: 1 | Status: SHIPPED | OUTPATIENT
Start: 2022-04-22 | End: 2022-10-24

## 2022-04-22 RX ORDER — POTASSIUM CHLORIDE 750 MG/1
CAPSULE, EXTENDED RELEASE ORAL
Qty: 14 CAPSULE | Refills: 0 | Status: SHIPPED | OUTPATIENT
Start: 2022-04-22 | End: 2022-08-11

## 2022-04-22 RX ORDER — LIOTHYRONINE SODIUM 5 UG/1
TABLET ORAL
Qty: 60 TABLET | Refills: 1 | Status: SHIPPED | OUTPATIENT
Start: 2022-04-22 | End: 2022-06-20

## 2022-04-22 NOTE — PROGRESS NOTES
Yumiko is a 31 year old who is being evaluated via a billable video visit.      How would you like to obtain your AVS? MyChart  If the video visit is dropped, the invitation should be resent by: Send to e-mail at: [email or web address]  Will anyone else be joining your video visit? No    This is a telephone visit - start time 8:35, stop time 8:50, chart review, documentation time 15 minutes, total time 30 minutes    HPI  #1  Multifocal Papillary thyroid cancer (T3, N0, Mx) , status post right hemithyroidectomy and isthumusectomy in November 2019-4.3 cm in size with 10% possibly poorly differentiated - now s/p completion thyroidectomy on 4/12/22  In 2019, the pt had  An ultrasound thyroid demonstrated multiple nodules in the right thyroid lobe, the largest was 3.7cm. 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  She underwent right thyroidectomy and isthmusectomy by Dr. Camargo on 11/26/2019.  Surgical pathology showed 4.3 cm papillary thyroid cancer, unifocal with clear margin.  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 The papillary thyroid cancer was well-circumscribed and encapsulated with predominantly follicular growth pattern.  However the multifocal growth of tall cells and a oncocytic changes and mitosis is focally increased up to 5 per 10 high-power fields.  The findings are consistent with focal poorly differentiated thyroid carcinoma of 10% however there was no capsular or angiolymphatic invasion.  Therefore this tumor is most likely not behave in an aggressive fashion.  No lymph node was removed.  Tumor staging pT3aNx.   She was seen by endocrinology in 2020-completion thyroidectomy was recommended however the patient had deferred due to concerns about need for lifelong levothyroxine replacement.  February 2022 neck ultrasound shows possibly enlarging nodule on the left side (around 5 mm). February 2022 TSH each normal, thyroglobulin around 19.6 (still has  remaining left thyroid)    Interval history: Pt has completion thyroidectomy on 4/12/22. Pathology showed no malignancy in remaining thyroid.  She was discharged on levothyroxine at 112 mcg daily.  Interestingly, she has had multiple symptoms  (noted below) occurred several days after her thyroidectomy    #2 Abdominal pain noted post-operatively  Patient reports some bloating of her abdomen, generalized pain, postoperatively.  She did take about 4 tablets of oxycodone for pain control.  She denies any similar symptoms with previous opioid use (last times many years ago).  She is passing gas and having bowel movements.  She reports some shortness of breath (see below).  She did take her last oxycodone tablet today.  She does report that her menses will likely start soon.    #3 throat soreness for 2 days  The patient reports sore throat for the last 2 days.  She reports 3 home COVID test which were unremarkable.    #4 left shoulder pain  She reports some pain in her left shoulder, particular when she takes a deep breath.    #5 dyspnea  She reports some pain with deep inspiration.  She is uncertain whether the bloating may be exacerbating her symptoms.  She was instructed to go to the emergency room last night but after several hours waiting in the emergency room due to a delayed evaluation (triage), she decided to leave.    Past Medical History  Past Medical History:   Diagnosis Date     Cervical high risk HPV (human papillomavirus) test positive 02/25/2021 02/25/21     Complication of anesthesia      History of ITP 1995     PONV (postoperative nausea and vomiting)        Allergies  No Known Allergies  Medications  Current Outpatient Medications   Medication Sig Dispense Refill     levothyroxine (SYNTHROID/LEVOTHROID) 112 MCG tablet Take 1 tablet (112 mcg) by mouth in the morning. 30 tablet 3     oxyCODONE (ROXICODONE) 5 MG tablet Take 1-2 tablets (5-10 mg) by mouth every 4 hours as needed for moderate to severe  pain 6 tablet 0     spironolactone (ALDACTONE) 50 MG tablet Take 1 tablet (50 mg) by mouth daily 90 tablet 0     Family History  family history includes Heart Disease in her maternal grandfather; Melanoma (age of onset: 60) in her maternal grandmother; Thyroid Disease in her paternal aunt; Thyroid Disease (age of onset: 45) in her maternal grandfather; Valvular heart disease in her maternal grandfather.  Social History  Social History     Socioeconomic History     Marital status: Single     Spouse name: Not on file     Number of children: Not on file     Years of education: Not on file     Highest education level: Not on file   Occupational History     Not on file   Tobacco Use     Smoking status: Never Smoker     Smokeless tobacco: Never Used   Substance and Sexual Activity     Alcohol use: Yes     Comment: very litter      Drug use: Never     Sexual activity: Not Currently     Partners: Male     Birth control/protection: Condom   Other Topics Concern     Not on file   Social History Narrative     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 with 1 roomate     Social Determinants of Health     Financial Resource Strain: Not on file   Food Insecurity: Not on file   Transportation Needs: Not on file   Physical Activity: Not on file   Stress: Not on file   Social Connections: Not on file   Intimate Partner Violence: Not on file   Housing Stability: Not on file       ROS  Per HPI      Physical Exam  Vital signs and physical exam not available.  However the patient reports feeling well. Psych: Alert and oriented times 3; coherent speech, normal  rate and volume, able to articulate logical thoughts, able to abstract reason, no tangential thoughts, no hallucinations or delusions    RESULTS  Thyroid tissue with no pathologic abnormality.  -No evidence of malignancy identified.   Electronically signed by Deion Howe MD on 4/17/2022 at  6:27 PM   Clinical Information  [facility]  "  Procedure:  completion thyroidectomy  Pre-op Diagnosis: Malignant neoplasm of thyroid gland (H) [C73]  Post-op Diagnosis: C73 - Malignant neoplasm of thyroid gland (H) [ICD-10-CM]     31-year-old female with history of papillary thyroid carcinoma of right lobe (resected in 2019), now undergoing completion thyroidectomy.   Gross Description  [facility]   A(1). Thyroid, left, Left lobe thyroid:  The specimen is received in formalin with proper patient identification, labeled \"left lobe thyroid\".  The specimen consists of a 6.8 g left hemithyroidectomy specimen which measures 4.1 cm superior to inferior, 2.5 cm transverse, and 1.0 cm anterior to posterior.  The isthmic margin is inked orange, the anterior surface is inked blue, and the posterior surface is inked black.  The capsule is ragged but appears intact.  The specimen is sectioned to reveal an unremarkable red-brown surface with no nodules or lesions identified.  Representative sections are submitted from superior to inferior in cassettes A1-A4.           No visits with results within 1 Week(s) from this visit.   Latest known visit with results is:   Hospital Outpatient Visit on 04/12/2022   Component Date Value Ref Range Status     HCG Qual Urine 04/12/2022 Negative  Negative Final     Internal QC Check POCT 04/12/2022 Valid  Valid Final     POCT Kit Lot Number 04/12/2022 [id number]   Final     POCT Kit Expiration Date 04/12/2022 08/31/2023   Final     Case Report 04/12/2022    Final                    Value:Surgical Pathology Report                         Case: [id number]                                  Authorizing Provider:  Olivia Camargo MD   Collected:           04/12/2022 09:40 AM          Ordering Location:     [facility] OR  Received:            04/12/2022 09:48 AM                                 [place]                                                                  Pathologist:           Deion Howe, " MD                                                   Specimen:    Thyroid, left, Left lobe thyroid                                                            Final Diagnosis 04/12/2022    Final                    Value:This result contains rich text formatting which cannot be displayed here.     Clinical Information 04/12/2022    Final                    Value:This result contains rich text formatting which cannot be displayed here.     Gross Description 04/12/2022    Final                    Value:This result contains rich text formatting which cannot be displayed here.     Microscopic Description 04/12/2022    Final                    Value:This result contains rich text formatting which cannot be displayed here.     Performing Labs 04/12/2022    Final                    Value:This result contains rich text formatting which cannot be displayed here.     Parathyroid Hormone Intact 04/12/2022 26  18 - 80 pg/mL Final       ASSESSMENT:    #1  Multifocal Papillary thyroid cancer (T3, N0, Mx) , status post right hemithyroidectomy and isthumusectomy in November 2019-4.3 cm in size with 10% possibly poorly differentiated - now s/p completion thyroidectomy on 4/12/22  Discussed with patient that her remaining left thyroid did not show any evidence for malignancy-this is great news.  She is on levothyroxine replacement.  We will have patient do labs noted below.    She is having some symptoms postoperatively-we will have patient do evaluation as noted below.  I may consider [place] Thyroid on patient for replacement.    #2 Abdominal pain noted post-operatively  I suspect that the patient did have constipation related to her opioid pain medication use.  We will have her try nabumetone in the meantime.  Discussed the possibility of abdominal ultrasound-she has deferred.    #3 throat soreness for 2 days  Patient to check labs noted below    #4 left shoulder pain  Chest x-ray noted below    #5 dyspnea  Will have patient do chest  x-ray as noted below.    Patient has follow-up next week-patient to do evaluation as noted below today.    Orders Placed This Encounter   Procedures     X-Ray Chest PA and Lateral     TSH     T4 free     T3 total     25 Hydroxyvitamin D2 and D3     T3 Free     Basic metabolic panel     Ionized Calcium     Asymptomatic COVID-19 Virus (Coronavirus) by PCR     CBC with platelets differential

## 2022-04-22 NOTE — LETTER
"4/22/2022       RE: Yumiko Marquez  [address]  [facility] [address]     Dear Colleague,    Thank you for referring your patient, Yumiko Marquez, to the [facility] [place] at [facility]. Please see a copy of my visit note below.    The patient has been notified of following:     \"This telephone visit will be conducted via a call between you and your physician/provider. We have found that certain health care needs can be provided without the need for a physical exam.  This service lets us provide the care you need with a short phone conversation.  If a prescription is necessary we can send it directly to your pharmacy.  If lab work is needed we can place an order for that and you can then stop by our lab to have the test done at a later time.    Telephone visits are billed at different rates depending on your insurance coverage. During this emergency period, for some insurers they may be billed the same as an in-person visit.  Please reach out to your insurance provider with any questions.    If during the course of the call the physician/provider feels a telephone visit is not appropriate, you will not be charged for this service.\"      Yumiko is a 31 year old who is being evaluated via a billable video visit.      How would you like to obtain your AVS? MyChart  If the video visit is dropped, the invitation should be resent by: Send to e-mail at: [email or web address]  Will anyone else be joining your video visit? No    This is a telephone visit - start time 8:35, stop time 8:50, chart review, documentation time 15 minutes, total time 30 minutes    HPI  #1  Multifocal Papillary thyroid cancer (T3, N0, Mx) , status post right hemithyroidectomy and isthumusectomy in November 2019-4.3 cm in size with 10% possibly poorly differentiated - now s/p completion thyroidectomy on 4/12/22  In 2019, the pt had  An ultrasound " thyroid demonstrated multiple nodules in the right thyroid lobe, the largest was 3.7cm. 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  She underwent right thyroidectomy and isthmusectomy by Dr. Camargo on 11/26/2019.  Surgical pathology showed 4.3 cm papillary thyroid cancer, unifocal with clear margin.  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 The papillary thyroid cancer was well-circumscribed and encapsulated with predominantly follicular growth pattern.  However the multifocal growth of tall cells and a oncocytic changes and mitosis is focally increased up to 5 per 10 high-power fields.  The findings are consistent with focal poorly differentiated thyroid carcinoma of 10% however there was no capsular or angiolymphatic invasion.  Therefore this tumor is most likely not behave in an aggressive fashion.  No lymph node was removed.  Tumor staging pT3aNx.   She was seen by endocrinology in 2020-completion thyroidectomy was recommended however the patient had deferred due to concerns about need for lifelong levothyroxine replacement.  February 2022 neck ultrasound shows possibly enlarging nodule on the left side (around 5 mm). February 2022 TSH each normal, thyroglobulin around 19.6 (still has remaining left thyroid)    Interval history: Pt has completion thyroidectomy on 4/12/22. Pathology showed no malignancy in remaining thyroid.  She was discharged on levothyroxine at 112 mcg daily.  Interestingly, she has had multiple symptoms  (noted below) occurred several days after her thyroidectomy    #2 Abdominal pain noted post-operatively  Patient reports some bloating of her abdomen, generalized pain, postoperatively.  She did take about 4 tablets of oxycodone for pain control.  She denies any similar symptoms with previous opioid use (last times many years ago).  She is passing gas and having bowel movements.  She reports some shortness of breath (see below).  She did take her  last oxycodone tablet today.  She does report that her menses will likely start soon.    #3 throat soreness for 2 days  The patient reports sore throat for the last 2 days.  She reports 3 home COVID test which were unremarkable.    #4 left shoulder pain  She reports some pain in her left shoulder, particular when she takes a deep breath.    #5 dyspnea  She reports some pain with deep inspiration.  She is uncertain whether the bloating may be exacerbating her symptoms.  She was instructed to go to the emergency room last night but after several hours waiting in the emergency room due to a delayed evaluation (triage), she decided to leave.    Past Medical History  Past Medical History:   Diagnosis Date     Cervical high risk HPV (human papillomavirus) test positive 02/25/2021 02/25/21     Complication of anesthesia      History of ITP 1995     PONV (postoperative nausea and vomiting)        Allergies  No Known Allergies  Medications  Current Outpatient Medications   Medication Sig Dispense Refill     levothyroxine (SYNTHROID/LEVOTHROID) 112 MCG tablet Take 1 tablet (112 mcg) by mouth in the morning. 30 tablet 3     oxyCODONE (ROXICODONE) 5 MG tablet Take 1-2 tablets (5-10 mg) by mouth every 4 hours as needed for moderate to severe pain 6 tablet 0     spironolactone (ALDACTONE) 50 MG tablet Take 1 tablet (50 mg) by mouth daily 90 tablet 0     Family History  family history includes Heart Disease in her maternal grandfather; Melanoma (age of onset: 60) in her maternal grandmother; Thyroid Disease in her paternal aunt; Thyroid Disease (age of onset: 45) in her maternal grandfather; Valvular heart disease in her maternal grandfather.  Social History  Social History     Socioeconomic History     Marital status: Single     Spouse name: Not on file     Number of children: Not on file     Years of education: Not on file     Highest education level: Not on file   Occupational History     Not on file   Tobacco Use      "Smoking status: Never Smoker     Smokeless tobacco: Never Used   Substance and Sexual Activity     Alcohol use: Yes     Comment: very litter      Drug use: Never     Sexual activity: Not Currently     Partners: Male     Birth control/protection: Condom   Other Topics Concern     Not on file   Social History Narrative     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 with 1 roomate     Social Determinants of Health     Financial Resource Strain: Not on file   Food Insecurity: Not on file   Transportation Needs: Not on file   Physical Activity: Not on file   Stress: Not on file   Social Connections: Not on file   Intimate Partner Violence: Not on file   Housing Stability: Not on file       ROS  Per HPI      Physical Exam  Vital signs and physical exam not available.  However the patient reports feeling well. Psych: Alert and oriented times 3; coherent speech, normal  rate and volume, able to articulate logical thoughts, able to abstract reason, no tangential thoughts, no hallucinations or delusions    RESULTS  Thyroid tissue with no pathologic abnormality.  -No evidence of malignancy identified.   Electronically signed by Deion Howe MD on 4/17/2022 at  6:27 PM   Clinical Information  [facility]   Procedure:  completion thyroidectomy  Pre-op Diagnosis: Malignant neoplasm of thyroid gland (H) [C73]  Post-op Diagnosis: C73 - Malignant neoplasm of thyroid gland (H) [ICD-10-CM]     31-year-old female with history of papillary thyroid carcinoma of right lobe (resected in 2019), now undergoing completion thyroidectomy.   Gross Description  [facility]   A(1). Thyroid, left, Left lobe thyroid:  The specimen is received in formalin with proper patient identification, labeled \"left lobe thyroid\".  The specimen consists of a 6.8 g left hemithyroidectomy specimen which measures 4.1 cm superior to inferior, 2.5 cm transverse, and 1.0 cm anterior to posterior.  The isthmic margin is inked " orange, the anterior surface is inked blue, and the posterior surface is inked black.  The capsule is ragged but appears intact.  The specimen is sectioned to reveal an unremarkable red-brown surface with no nodules or lesions identified.  Representative sections are submitted from superior to inferior in cassettes A1-A4.           No visits with results within 1 Week(s) from this visit.   Latest known visit with results is:   Hospital Outpatient Visit on 04/12/2022   Component Date Value Ref Range Status     HCG Qual Urine 04/12/2022 Negative  Negative Final     Internal QC Check POCT 04/12/2022 Valid  Valid Final     POCT Kit Lot Number 04/12/2022 [id number]   Final     POCT Kit Expiration Date 04/12/2022 08/31/2023   Final     Case Report 04/12/2022    Final                    Value:Surgical Pathology Report                         Case: [id number]                                  Authorizing Provider:  Olivia Camargo MD   Collected:           04/12/2022 09:40 AM          Ordering Location:     [facility] OR  Received:            04/12/2022 09:48 AM                                 [place]                                                                  Pathologist:           Deion Howe MD                                                   Specimen:    Thyroid, left, Left lobe thyroid                                                            Final Diagnosis 04/12/2022    Final                    Value:This result contains rich text formatting which cannot be displayed here.     Clinical Information 04/12/2022    Final                    Value:This result contains rich text formatting which cannot be displayed here.     Gross Description 04/12/2022    Final                    Value:This result contains rich text formatting which cannot be displayed here.     Microscopic Description 04/12/2022    Final                    Value:This result contains rich text formatting which cannot be  displayed here.     Performing Labs 04/12/2022    Final                    Value:This result contains rich text formatting which cannot be displayed here.     Parathyroid Hormone Intact 04/12/2022 26  18 - 80 pg/mL Final       ASSESSMENT:    #1  Multifocal Papillary thyroid cancer (T3, N0, Mx) , status post right hemithyroidectomy and isthumusectomy in November 2019-4.3 cm in size with 10% possibly poorly differentiated - now s/p completion thyroidectomy on 4/12/22  Discussed with patient that her remaining left thyroid did not show any evidence for malignancy-this is great news.  She is on levothyroxine replacement.  We will have patient do labs noted below.    She is having some symptoms postoperatively-we will have patient do evaluation as noted below.  I may consider [place] Thyroid on patient for replacement.    #2 Abdominal pain noted post-operatively  I suspect that the patient did have constipation related to her opioid pain medication use.  We will have her try nabumetone in the meantime.  Discussed the possibility of abdominal ultrasound-she has deferred.    #3 throat soreness for 2 days  Patient to check labs noted below    #4 left shoulder pain  Chest x-ray noted below    #5 dyspnea  Will have patient do chest x-ray as noted below.    Patient has follow-up next week-patient to do evaluation as noted below today.    Orders Placed This Encounter   Procedures     X-Ray Chest PA and Lateral     TSH     T4 free     T3 total     25 Hydroxyvitamin D2 and D3     T3 Free     Basic metabolic panel     Ionized Calcium     Asymptomatic COVID-19 Virus (Coronavirus) by PCR     CBC with platelets differential         Marzena Lagunas MD

## 2022-04-22 NOTE — PROGRESS NOTES
"The patient has been notified of following:     \"This telephone visit will be conducted via a call between you and your physician/provider. We have found that certain health care needs can be provided without the need for a physical exam.  This service lets us provide the care you need with a short phone conversation.  If a prescription is necessary we can send it directly to your pharmacy.  If lab work is needed we can place an order for that and you can then stop by our lab to have the test done at a later time.    Telephone visits are billed at different rates depending on your insurance coverage. During this emergency period, for some insurers they may be billed the same as an in-person visit.  Please reach out to your insurance provider with any questions.    If during the course of the call the physician/provider feels a telephone visit is not appropriate, you will not be charged for this service.\"    "

## 2022-04-22 NOTE — LETTER
Patient:  Yumiko Marquez  :   1990  MRN:     [id number]        Ms.Valerie ENRIQUE Marquez  [address]  [facility] [address]        2022    Dear Yumiko    Here are your labs.  I think you need a little bit more thyroid hormone.  I am going to have you take liothyronine at 5 mcg twice daily on top of the levothyroxine (take both together).  For the levothyroxine, I will increase you to 125 mcg daily.  I will send an updated prescription to your pharmacy.  Your potassium was also a little bit lower.  I will have you take some potassium for roughly 1 week.  Your calcium level is fine.    We will likely have you recheck your potassium, white count, and thyroid in about 2 weeks.  Lets get you going on this program to see how you do.    If you have any questions, please feel free to contact my nurse at [phone] select option #3 for triage nurse  or  option #1 for scheduling related questions.    Regards    Marzena Lagunas MD     Resulted Orders   TSH   Result Value Ref Range    TSH 6.97 (H) 0.40 - 4.00 mU/L   T4 free   Result Value Ref Range    Free T4 0.94 0.76 - 1.46 ng/dL   T3 total   Result Value Ref Range    T3 Total 54 (L) 60 - 181 ng/dL   T3 Free   Result Value Ref Range    T3 Free 1.5 (L) 2.3 - 4.2 pg/mL   Basic metabolic panel   Result Value Ref Range    Sodium 139 133 - 144 mmol/L    Potassium 3.2 (L) 3.4 - 5.3 mmol/L    Chloride 103 94 - 109 mmol/L    Carbon Dioxide (CO2) 28 20 - 32 mmol/L    Anion Gap 8 3 - 14 mmol/L    Urea Nitrogen 5 (L) 7 - 30 mg/dL    Creatinine 0.60 0.52 - 1.04 mg/dL    Calcium 9.0 8.5 - 10.1 mg/dL    Glucose 95 70 - 99 mg/dL    GFR Estimate >90 >60 mL/min/1.73m2      Comment:      Effective 2021 eGFRcr in adults is calculated using the  CKD-EPI creatinine equation which includes age and gender (Petra hahn al., NEJM, DOI: 10.1056/FZIAiy4152380)   Ionized Calcium   Result Value Ref Range    Calcium Ionized 4.8 4.4 - 5.2 mg/dL   CBC with platelets  and differential   Result Value Ref Range    WBC Count 13.2 (H) 4.0 - 11.0 10e3/uL    RBC Count 4.12 3.80 - 5.20 10e6/uL    Hemoglobin 12.2 11.7 - 15.7 g/dL    Hematocrit 37.3 35.0 - 47.0 %    MCV 91 78 - 100 fL    MCH 29.6 26.5 - 33.0 pg    MCHC 32.7 31.5 - 36.5 g/dL    RDW 13.2 10.0 - 15.0 %    Platelet Count 334 150 - 450 10e3/uL    % Neutrophils 87 %    % Lymphocytes 8 %    % Monocytes 4 %    % Eosinophils 0 %    % Basophils 0 %    % Immature Granulocytes 1 %    NRBCs per 100 WBC 0 <1 /100    Absolute Neutrophils 11.4 (H) 1.6 - 8.3 10e3/uL    Absolute Lymphocytes 1.1 0.8 - 5.3 10e3/uL    Absolute Monocytes 0.5 0.0 - 1.3 10e3/uL    Absolute Eosinophils 0.1 0.0 - 0.7 10e3/uL    Absolute Basophils 0.0 0.0 - 0.2 10e3/uL    Absolute Immature Granulocytes 0.1 <=0.4 10e3/uL    Absolute NRBCs 0.0 10e3/uL       Lab

## 2022-04-23 NOTE — TELEPHONE ENCOUNTER
Labs  noted below.  Potassium slightly low at 3.2-we will have patient take potassium at 20 mEq daily for 1 week.  We will add liothyronine at 5 mcg twice daily to her levothyroxine program.  We will increase levothyroxine to 125 mcg daily.  -  Dear Yumiko    Here are your labs.  I think you need a little bit more thyroid hormone.  I am going to have you take liothyronine at 5 mcg twice daily on top of the levothyroxine (take both together).  For the levothyroxine, I will increase you to 125 mcg daily.  I will send an updated prescription to your pharmacy.  Your potassium was also a little bit lower.  I will have you take some potassium for roughly 1 week.  Your calcium level is fine.    We will likely have you recheck your potassium, white count, and thyroid in about 2 weeks.  Lets get you going on this program to see how you do.    If you have any questions, please feel free to contact my nurse at [phone] select option #3 for triage nurse  or  option #1 for scheduling related questions.    Regards    Marzena Lagunas MD     -    Lab on 04/22/2022   Component Date Value Ref Range Status     TSH 04/22/2022 6.97 (A) 0.40 - 4.00 mU/L Final     Free T4 04/22/2022 0.94  0.76 - 1.46 ng/dL Final     T3 Total 04/22/2022 54 (A) 60 - 181 ng/dL Final     T3 Free 04/22/2022 1.5 (A) 2.3 - 4.2 pg/mL Final     Sodium 04/22/2022 139  133 - 144 mmol/L Final     Potassium 04/22/2022 3.2 (A) 3.4 - 5.3 mmol/L Final     Chloride 04/22/2022 103  94 - 109 mmol/L Final     Carbon Dioxide (CO2) 04/22/2022 28  20 - 32 mmol/L Final     Anion Gap 04/22/2022 8  3 - 14 mmol/L Final     Urea Nitrogen 04/22/2022 5 (A) 7 - 30 mg/dL Final     Creatinine 04/22/2022 0.60  0.52 - 1.04 mg/dL Final     Calcium 04/22/2022 9.0  8.5 - 10.1 mg/dL Final     Glucose 04/22/2022 95  70 - 99 mg/dL Final     GFR Estimate 04/22/2022 >90  >60 mL/min/1.73m2 Final    Effective December 21, 2021 eGFRcr in adults is calculated using the 2021 CKD-EPI creatinine  equation which includes age and gender (Petra hahn al., [facility], DOI: 10.1056/KIFTqc8382697)     Calcium Ionized 04/22/2022 4.8  4.4 - 5.2 mg/dL Final     WBC Count 04/22/2022 13.2 (A) 4.0 - 11.0 10e3/uL Final     RBC Count 04/22/2022 4.12  3.80 - 5.20 10e6/uL Final     Hemoglobin 04/22/2022 12.2  11.7 - 15.7 g/dL Final     Hematocrit 04/22/2022 37.3  35.0 - 47.0 % Final     MCV 04/22/2022 91  78 - 100 fL Final     MCH 04/22/2022 29.6  26.5 - 33.0 pg Final     MCHC 04/22/2022 32.7  31.5 - 36.5 g/dL Final     RDW 04/22/2022 13.2  10.0 - 15.0 % Final     Platelet Count 04/22/2022 334  150 - 450 10e3/uL Final     % Neutrophils 04/22/2022 87  % Final     % Lymphocytes 04/22/2022 8  % Final     % Monocytes 04/22/2022 4  % Final     % Eosinophils 04/22/2022 0  % Final     % Basophils 04/22/2022 0  % Final     % Immature Granulocytes 04/22/2022 1  % Final     NRBCs per 100 WBC 04/22/2022 0  <1 /100 Final     Absolute Neutrophils 04/22/2022 11.4 (A) 1.6 - 8.3 10e3/uL Final     Absolute Lymphocytes 04/22/2022 1.1  0.8 - 5.3 10e3/uL Final     Absolute Monocytes 04/22/2022 0.5  0.0 - 1.3 10e3/uL Final     Absolute Eosinophils 04/22/2022 0.1  0.0 - 0.7 10e3/uL Final     Absolute Basophils 04/22/2022 0.0  0.0 - 0.2 10e3/uL Final     Absolute Immature Granulocytes 04/22/2022 0.1  <=0.4 10e3/uL Final     Absolute NRBCs 04/22/2022 0.0  10e3/uL Final

## 2022-04-23 NOTE — RESULT ENCOUNTER NOTE
Dear Yumiko    Here are your labs.  I think you need a little bit more thyroid hormone.  I am going to have you take liothyronine at 5 mcg twice daily on top of the levothyroxine (take both together).  For the levothyroxine, I will increase you to 125 mcg daily.  I will send an updated prescription to your pharmacy.  Your potassium was also a little bit lower.  I will have you take some potassium for roughly 1 week.  Your calcium level is fine.    We will likely have you recheck your potassium, white count, and thyroid in about 2 weeks.  Lets get you going on this program to see how you do.    If you have any questions, please feel free to contact my nurse at [phone] select option #3 for triage nurse  or  option #1 for scheduling related questions.    Regards    Marzena Lagunas MD

## 2022-04-25 ENCOUNTER — OFFICE VISIT (OUTPATIENT)
Dept: OTOLARYNGOLOGY | Facility: CLINIC | Age: 32
End: 2022-04-25
Payer: COMMERCIAL

## 2022-04-25 ENCOUNTER — TELEPHONE (OUTPATIENT)
Dept: ENDOCRINOLOGY | Facility: CLINIC | Age: 32
End: 2022-04-25

## 2022-04-25 VITALS
SYSTOLIC BLOOD PRESSURE: 111 MMHG | WEIGHT: 154 LBS | BODY MASS INDEX: 24.17 KG/M2 | HEART RATE: 74 BPM | DIASTOLIC BLOOD PRESSURE: 65 MMHG | OXYGEN SATURATION: 100 % | HEIGHT: 67 IN | TEMPERATURE: 98.4 F

## 2022-04-25 DIAGNOSIS — F41.9 ANXIETY: Primary | ICD-10-CM

## 2022-04-25 DIAGNOSIS — C73 MALIGNANT NEOPLASM OF THYROID GLAND (H): Primary | ICD-10-CM

## 2022-04-25 PROCEDURE — 99024 POSTOP FOLLOW-UP VISIT: CPT | Performed by: SURGERY

## 2022-04-25 RX ORDER — DULOXETIN HYDROCHLORIDE 30 MG/1
30 CAPSULE, DELAYED RELEASE ORAL DAILY
Qty: 30 CAPSULE | Refills: 1 | Status: SHIPPED | OUTPATIENT
Start: 2022-04-25 | End: 2022-08-11

## 2022-04-25 ASSESSMENT — PAIN SCALES - GENERAL: PAINLEVEL: MILD PAIN (2)

## 2022-04-25 NOTE — LETTER
4/25/2022       RE: Yumiko Marquez  [address]  [facility] [address]     Dear Colleague,    Thank you for referring your patient, Yumiko Marquez, to the [facility] [place] at [facility]. Please see a copy of my visit note below.    2 week s/p completions thyroidectomy for PTC.     Since the surgery the patient denies any problems with voice quality, breathing. No sx of hypocalcemia or hypothyroidism.     PE:  Wound is healing well. No evidence of hematoma or infection.    Pathology reviewed with the patient.    Plan:  Reviewed wound management and massage  Dr. Lagunas is following the patients medication and thyroid cancer management hereafter    Olivia Camargo MD

## 2022-04-25 NOTE — NURSING NOTE
"Chief Complaint   Patient presents with     RECHECK     2 wek post op      Blood pressure 111/65, pulse 74, temperature 98.4  F (36.9  C), height 1.702 m (5' 7\"), weight 69.9 kg (154 lb), SpO2 100 %, not currently breastfeeding.    Jero Cadena LPN    "

## 2022-04-27 DIAGNOSIS — C73 PAPILLARY THYROID CARCINOMA (H): ICD-10-CM

## 2022-04-27 LAB
DEPRECATED CALCIDIOL+CALCIFEROL SERPL-MC: <32 UG/L (ref 20–75)
VITAMIN D2 SERPL-MCNC: <5 UG/L
VITAMIN D3 SERPL-MCNC: 27 UG/L

## 2022-04-28 NOTE — PROGRESS NOTES
Yumiko Marquez  is being evaluated via a billable video visit.      How would you like to obtain your AVS? [employer]  For the video visit, send the invitation by: Send to e-mail at: [email or web address]  Will anyone else be joining your video visit? No     This was a virtual visit conducted by Benny.  Start time 430, stop time 450.  Chart review, documentation time, coordination of care, 10 minutes.  Total time spent day of encounter 30 minutes    HPI  #1  Multifocal Papillary thyroid cancer (T3, N0, Mx) , status post right hemithyroidectomy and isthumusectomy in November 2019-4.3 cm in size with 10% possibly poorly differentiated - now s/p completion thyroidectomy on 4/12/22  In 2019, the pt had  An ultrasound thyroid demonstrated multiple nodules in the right thyroid lobe, the largest was 3.7cm. 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  She underwent right thyroidectomy and isthmusectomy by Dr. Camargo on 11/26/2019.  Surgical pathology showed 4.3 cm papillary thyroid cancer, unifocal with clear margin.  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 The papillary thyroid cancer was well-circumscribed and encapsulated with predominantly follicular growth pattern.  However the multifocal growth of tall cells and a oncocytic changes and mitosis is focally increased up to 5 per 10 high-power fields.  The findings are consistent with focal poorly differentiated thyroid carcinoma of 10% however there was no capsular or angiolymphatic invasion.  Therefore this tumor is most likely not behave in an aggressive fashion.  No lymph node was removed.  Tumor staging [id number].   She was seen by endocrinology in 2020-completion thyroidectomy was recommended however the patient had deferred due to concerns about need for lifelong levothyroxine replacement.  February 2022 neck ultrasound shows possibly enlarging nodule on the left side (around 5 mm). February 2022 TSH each normal,  thyroglobulin around 19.6 (still has remaining left thyroid)    Pt completion thyroidectomy on 4/12/22. Pathology showed no malignancy in remaining thyroid.  She was discharged on levothyroxine at 112 mcg daily.      Interval history: When I saw the patient last week (April 22, 2022), she was feeling extremely poorly with a high TSH at 6.97, low T3 at 54, low free T3 at 1.5, and normal free T4 at 0.94.  Her potassium was also low at 3.2 and her white count was slightly higher at 11.4.  As a result, I increased her levothyroxine to 125 mcg daily and added T3 at 5 mcg twice daily.  On this program, the patient reports feeling better.  She does report regular bowel movements.  She reports that her shortness of breath has improved.  She actually has even returned to work earlier this week.    #2 Abdominal pain noted post-operatively  Patient reports some bloating of her abdomen, generalized pain, postoperatively.  She did take about 4 tablets of oxycodone for pain control.  She denies any similar symptoms with previous opioid use (last times many years ago).      Interval history: The patient has been using nabumetone for pain (primarily menstrual pain) as needed.  She reports that this has improved and she is passing gas.    #3 throat soreness for 2 days  The patient reports sore throat for the last 2 days.  She reports 3 home COVID test which were unremarkable.    Interval history not discussed today    #4 left shoulder pain  She reports some pain in her left shoulder, particular when she takes a deep breath.    Interval history: Chest x-ray from last week was unremarkable, per patient report this has improved    #5 dyspnea  She reports some pain with deep inspiration.  She is uncertain whether the bloating may be exacerbating her symptoms.  She was instructed to go to the emergency room last night but after several hours waiting in the emergency room due to a delayed evaluation (triage), she decided to  leave.    Interval history: Chest x-ray from April 2022 was negative, patient reports feeling better.    Past Medical History  Past Medical History:   Diagnosis Date     Cervical high risk HPV (human papillomavirus) test positive 02/25/2021 02/25/21     Complication of anesthesia      History of ITP 1995     PONV (postoperative nausea and vomiting)        Allergies  No Known Allergies  Medications  Current Outpatient Medications   Medication Sig Dispense Refill     DULoxetine (CYMBALTA) 30 MG capsule Take 1 capsule (30 mg) by mouth daily (Patient not taking: Reported on 4/25/2022) 30 capsule 1     levothyroxine (SYNTHROID/LEVOTHROID) 125 MCG tablet Take 1 tablet (125 mcg) by mouth daily 90 tablet 3     liothyronine (CYTOMEL) 5 MCG tablet 1 tablet twice daily (on top of levothyroxine) 60 tablet 1     nabumetone (RELAFEN) 500 MG tablet Take 1 tablet (500 mg) by mouth 2 times daily 60 tablet 1     potassium chloride ER (MICRO-K) 10 MEQ CR capsule 2 tablet daily for 1 week 14 capsule 0     spironolactone (ALDACTONE) 50 MG tablet Take 1 tablet (50 mg) by mouth daily (Patient not taking: Reported on 4/25/2022) 90 tablet 0     Family History  family history includes Heart Disease in her maternal grandfather; Melanoma (age of onset: 60) in her maternal grandmother; Thyroid Disease in her paternal aunt; Thyroid Disease (age of onset: 45) in her maternal grandfather; Valvular heart disease in her maternal grandfather.  Social History  Social History     Socioeconomic History     Marital status: Single     Spouse name: Not on file     Number of children: Not on file     Years of education: Not on file     Highest education level: Not on file   Occupational History     Not on file   Tobacco Use     Smoking status: Never Smoker     Smokeless tobacco: Never Used   Substance and Sexual Activity     Alcohol use: Yes     Comment: very litter      Drug use: Never     Sexual activity: Not Currently     Partners: Male     Birth  "control/protection: Condom   Other Topics Concern     Not on file   Social History Narrative     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  No discharge or erythema, or obvious scleral/conjunctival abnormalities.\",\"RESP: No audible wheeze, cough, or visible cyanosis.  No visible retractions or increased work of breathing.  \",\"SKIN: Visible skin clear. No significant rash, abnormal pigmentation or lesions.\",\"NEURO: Cranial nerves grossly intact.  Mentation and speech appropriate for age.\",\"PSYCH: Mentation appears normal, affect normal/bright, judgement and insight intact, normal speech and appearance well-groomed.    RESULTS  No visits with results within 1 Week(s) from this visit.   Latest known visit with results is:   Lab on 04/22/2022   Component Date Value Ref Range Status     TSH 04/22/2022 6.97 (A) 0.40 - 4.00 mU/L Final     Free T4 04/22/2022 0.94  0.76 - 1.46 ng/dL Final     T3 Total 04/22/2022 54 (A) 60 - 181 ng/dL Final     25 OH Vitamin D2 04/22/2022 <5  ug/L Final     25 OH Vitamin D3 04/22/2022 27  ug/L Final     25 OH Vit D Total 04/22/2022 <32  20 - 75 ug/L Final    Season, race, dietary intake, and treatment affect the concentration of 25-hydroxy-Vitamin D. Values may decrease during winter months and increase during summer months. Values 20-29 ug/L may indicate Vitamin D insufficiency and values <20 ug/L may indicate Vitamin D deficiency.     T3 Free 04/22/2022 1.5 (A) 2.3 - 4.2 pg/mL Final     Sodium 04/22/2022 139  133 - 144 mmol/L " Final     Potassium 04/22/2022 3.2 (A) 3.4 - 5.3 mmol/L Final     Chloride 04/22/2022 103  94 - 109 mmol/L Final     Carbon Dioxide (CO2) 04/22/2022 28  20 - 32 mmol/L Final     Anion Gap 04/22/2022 8  3 - 14 mmol/L Final     Urea Nitrogen 04/22/2022 5 (A) 7 - 30 mg/dL Final     Creatinine 04/22/2022 0.60  0.52 - 1.04 mg/dL Final     Calcium 04/22/2022 9.0  8.5 - 10.1 mg/dL Final     Glucose 04/22/2022 95  70 - 99 mg/dL Final     GFR Estimate 04/22/2022 >90  >60 mL/min/1.73m2 Final    Effective December 21, 2021 eGFRcr in adults is calculated using the 2021 CKD-EPI creatinine equation which includes age and gender (Petra et al., NE, DOI: 10.1056/ZZGAcc4522426)     Calcium Ionized 04/22/2022 4.8  4.4 - 5.2 mg/dL Final     Hepatitis C Antibody 04/22/2022 Nonreactive  Nonreactive Final     WBC Count 04/22/2022 13.2 (A) 4.0 - 11.0 10e3/uL Final     RBC Count 04/22/2022 4.12  3.80 - 5.20 10e6/uL Final     Hemoglobin 04/22/2022 12.2  11.7 - 15.7 g/dL Final     Hematocrit 04/22/2022 37.3  35.0 - 47.0 % Final     MCV 04/22/2022 91  78 - 100 fL Final     MCH 04/22/2022 29.6  26.5 - 33.0 pg Final     MCHC 04/22/2022 32.7  31.5 - 36.5 g/dL Final     RDW 04/22/2022 13.2  10.0 - 15.0 % Final     Platelet Count 04/22/2022 334  150 - 450 10e3/uL Final     % Neutrophils 04/22/2022 87  % Final     % Lymphocytes 04/22/2022 8  % Final     % Monocytes 04/22/2022 4  % Final     % Eosinophils 04/22/2022 0  % Final     % Basophils 04/22/2022 0  % Final     % Immature Granulocytes 04/22/2022 1  % Final     NRBCs per 100 WBC 04/22/2022 0  <1 /100 Final     Absolute Neutrophils 04/22/2022 11.4 (A) 1.6 - 8.3 10e3/uL Final     Absolute Lymphocytes 04/22/2022 1.1  0.8 - 5.3 10e3/uL Final     Absolute Monocytes 04/22/2022 0.5  0.0 - 1.3 10e3/uL Final     Absolute Eosinophils 04/22/2022 0.1  0.0 - 0.7 10e3/uL Final     Absolute Basophils 04/22/2022 0.0  0.0 - 0.2 10e3/uL Final     Absolute Immature Granulocytes 04/22/2022 0.1  <=0.4 10e3/uL  Final     Absolute NRBCs 04/22/2022 0.0  10e3/uL Final         ASSESSMENT:    #1  Multifocal Papillary thyroid cancer (T3, N0, Mx) , status post right hemithyroidectomy and isthumusectomy in November 2019-4.3 cm in size with 10% possibly poorly differentiated - now s/p completion thyroidectomy on 4/12/22  Discussed with patient that her remaining left thyroid did not show any evidence for malignancy.  Extensive discussion with patient about radioactive iodine ablation.  Although this could be considered, given that she has had completion thyroidectomy with no evidence for residual disease on the left side, continued observation would be reasonable.  Although my preference probably would be ablative treatment with I-131, given her other symptoms currently, I think it is reasonable to wait.  Indeed, if we do consider radioactive iodine ablation, it would be at least 2 to 3 months after surgery to give her more time to heal.    I discussed with the patient the options on [id number] we would recommend thyroglobulin as well as neck ultrasound.  We will do the thyroglobulin every 6 months and a neck ultrasound once per year.  If she does decide for the radioactive iodine, we would also do a whole-body scan and uptake.  However the most important work has been done which is a completion thyroidectomy.    We will see what is her ultimate thyroid hormone program.  Currently she feels much better.  We will have patient check labs next week.  My preference is that she stop her Cytomel after 1 month if you can continue just on the levothyroxine.  If she does not feel well just on the levothyroxine, we can always add the Cytomel back.  I discussed with her the possibility of [place] Thyroid, however there are some variability with [place] Thyroid that both she and I would like to minimize.    Patient giving a standing order to check thyroid function at any time at local [facility].    #2 Abdominal pain noted  post-operatively  This has improved.    #3 throat soreness for 2 days  Not discussed today    #4 left shoulder pain  Improved    #5 dyspnea  Improved.    Patient to do labs in 1 week, plan for return visit in 6 months.

## 2022-04-28 NOTE — RESULT ENCOUNTER NOTE
Hello,    Great news, your results were normal.  We have seen a significant rise of hepatitis C in some adults.  Your labs show no signs of exposure    This is not something we routinely retest for unless you have high risk partners, a blood transfusion or needle exposures  Very good,    Aleta Holder MD

## 2022-04-29 ENCOUNTER — VIRTUAL VISIT (OUTPATIENT)
Dept: ENDOCRINOLOGY | Facility: CLINIC | Age: 32
End: 2022-04-29
Payer: COMMERCIAL

## 2022-04-29 DIAGNOSIS — C73 PAPILLARY THYROID CARCINOMA (H): Primary | ICD-10-CM

## 2022-04-29 PROCEDURE — 99214 OFFICE O/P EST MOD 30 MIN: CPT | Mod: 95 | Performed by: INTERNAL MEDICINE

## 2022-04-29 NOTE — LETTER
4/29/2022       RE: Yumiko Marquez  [address]  [facility] [address]     Dear Colleague,    Thank you for referring your patient, Yumiko Marquez, to the [facility] [place] at [facility]. Please see a copy of my visit note below.    Yumiko Marquez  is being evaluated via a billable video visit.      How would you like to obtain your AVS? [email or web address]  For the video visit, send the invitation by: Send to e-mail at: [email or web address]  Will anyone else be joining your video visit? No     This was a virtual visit conducted by Benny.  Start time 430, stop time 450.  Chart review, documentation time, coordination of care, 10 minutes.  Total time spent day of encounter 30 minutes    HPI  #1  Multifocal Papillary thyroid cancer (T3, N0, Mx) , status post right hemithyroidectomy and isthumusectomy in November 2019-4.3 cm in size with 10% possibly poorly differentiated - now s/p completion thyroidectomy on 4/12/22  In 2019, the pt had  An ultrasound thyroid demonstrated multiple nodules in the right thyroid lobe, the largest was 3.7cm. 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  She underwent right thyroidectomy and isthmusectomy by Dr. Camargo on 11/26/2019.  Surgical pathology showed 4.3 cm papillary thyroid cancer, unifocal with clear margin.  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 The papillary thyroid cancer was well-circumscribed and encapsulated with predominantly follicular growth pattern.  However the multifocal growth of tall cells and a oncocytic changes and mitosis is focally increased up to 5 per 10 high-power fields.  The findings are consistent with focal poorly differentiated thyroid carcinoma of 10% however there was no capsular or angiolymphatic invasion.  Therefore this tumor is most likely not behave in an aggressive fashion.  No lymph node was removed.   Tumor staging [id number].   She was seen by endocrinology in 2020-completion thyroidectomy was recommended however the patient had deferred due to concerns about need for lifelong levothyroxine replacement.  February 2022 neck ultrasound shows possibly enlarging nodule on the left side (around 5 mm). February 2022 TSH each normal, thyroglobulin around 19.6 (still has remaining left thyroid)    Pt completion thyroidectomy on 4/12/22. Pathology showed no malignancy in remaining thyroid.  She was discharged on levothyroxine at 112 mcg daily.      Interval history: When I saw the patient last week (April 22, 2022), she was feeling extremely poorly with a high TSH at 6.97, low T3 at 54, low free T3 at 1.5, and normal free T4 at 0.94.  Her potassium was also low at 3.2 and her white count was slightly higher at 11.4.  As a result, I increased her levothyroxine to 125 mcg daily and added T3 at 5 mcg twice daily.  On this program, the patient reports feeling better.  She does report regular bowel movements.  She reports that her shortness of breath has improved.  She actually has even returned to work earlier this week.    #2 Abdominal pain noted post-operatively  Patient reports some bloating of her abdomen, generalized pain, postoperatively.  She did take about 4 tablets of oxycodone for pain control.  She denies any similar symptoms with previous opioid use (last times many years ago).      Interval history: The patient has been using nabumetone for pain (primarily menstrual pain) as needed.  She reports that this has improved and she is passing gas.    #3 throat soreness for 2 days  The patient reports sore throat for the last 2 days.  She reports 3 home COVID test which were unremarkable.    Interval history not discussed today    #4 left shoulder pain  She reports some pain in her left shoulder, particular when she takes a deep breath.    Interval history: Chest x-ray from last week was unremarkable, per patient report  this has improved    #5 dyspnea  She reports some pain with deep inspiration.  She is uncertain whether the bloating may be exacerbating her symptoms.  She was instructed to go to the emergency room last night but after several hours waiting in the emergency room due to a delayed evaluation (triage), she decided to leave.    Interval history: Chest x-ray from April 2022 was negative, patient reports feeling better.    Past Medical History  Past Medical History:   Diagnosis Date     Cervical high risk HPV (human papillomavirus) test positive 02/25/2021 02/25/21     Complication of anesthesia      History of ITP 1995     PONV (postoperative nausea and vomiting)        Allergies  No Known Allergies  Medications  Current Outpatient Medications   Medication Sig Dispense Refill     DULoxetine (CYMBALTA) 30 MG capsule Take 1 capsule (30 mg) by mouth daily (Patient not taking: Reported on 4/25/2022) 30 capsule 1     levothyroxine (SYNTHROID/LEVOTHROID) 125 MCG tablet Take 1 tablet (125 mcg) by mouth daily 90 tablet 3     liothyronine (CYTOMEL) 5 MCG tablet 1 tablet twice daily (on top of levothyroxine) 60 tablet 1     nabumetone (RELAFEN) 500 MG tablet Take 1 tablet (500 mg) by mouth 2 times daily 60 tablet 1     potassium chloride ER (MICRO-K) 10 MEQ CR capsule 2 tablet daily for 1 week 14 capsule 0     spironolactone (ALDACTONE) 50 MG tablet Take 1 tablet (50 mg) by mouth daily (Patient not taking: Reported on 4/25/2022) 90 tablet 0     Family History  family history includes Heart Disease in her maternal grandfather; Melanoma (age of onset: 60) in her maternal grandmother; Thyroid Disease in her paternal aunt; Thyroid Disease (age of onset: 45) in her maternal grandfather; Valvular heart disease in her maternal grandfather.  Social History  Social History     Socioeconomic History     Marital status: Single     Spouse name: Not on file     Number of children: Not on file     Years of education: Not on file     Highest  "education level: Not on file   Occupational History     Not on file   Tobacco Use     Smoking status: Never Smoker     Smokeless tobacco: Never Used   Substance and Sexual Activity     Alcohol use: Yes     Comment: very litter      Drug use: Never     Sexual activity: Not Currently     Partners: Male     Birth control/protection: Condom   Other Topics Concern     Not on file   Social History Narrative     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  No discharge or erythema, or obvious scleral/conjunctival abnormalities.\",\"RESP: No audible wheeze, cough, or visible cyanosis.  No visible retractions or increased work of breathing.  \",\"SKIN: Visible skin clear. No significant rash, abnormal pigmentation or lesions.\",\"NEURO: Cranial nerves grossly intact.  Mentation and speech appropriate for age.\",\"PSYCH: Mentation appears normal, affect normal/bright, judgement and insight intact, normal speech and appearance well-groomed.    RESULTS  No visits with results within 1 Week(s) from this visit.   Latest known visit with results is:   Lab on 04/22/2022   Component Date Value Ref Range Status     TSH 04/22/2022 6.97 (A) 0.40 - 4.00 mU/L Final     Free T4 04/22/2022 0.94  0.76 - 1.46 ng/dL Final     T3 Total 04/22/2022 54 (A) 60 - 181 ng/dL Final     25 OH Vitamin D2 04/22/2022 <5  ug/L Final     25 OH Vitamin D3 04/22/2022 27  ug/L Final     25 OH Vit D Total 04/22/2022 <32  20 - 75 ug/L Final    Season, race, dietary intake, and treatment affect the concentration " of 25-hydroxy-Vitamin D. Values may decrease during winter months and increase during summer months. Values 20-29 ug/L may indicate Vitamin D insufficiency and values <20 ug/L may indicate Vitamin D deficiency.     T3 Free 04/22/2022 1.5 (A) 2.3 - 4.2 pg/mL Final     Sodium 04/22/2022 139  133 - 144 mmol/L Final     Potassium 04/22/2022 3.2 (A) 3.4 - 5.3 mmol/L Final     Chloride 04/22/2022 103  94 - 109 mmol/L Final     Carbon Dioxide (CO2) 04/22/2022 28  20 - 32 mmol/L Final     Anion Gap 04/22/2022 8  3 - 14 mmol/L Final     Urea Nitrogen 04/22/2022 5 (A) 7 - 30 mg/dL Final     Creatinine 04/22/2022 0.60  0.52 - 1.04 mg/dL Final     Calcium 04/22/2022 9.0  8.5 - 10.1 mg/dL Final     Glucose 04/22/2022 95  70 - 99 mg/dL Final     GFR Estimate 04/22/2022 >90  >60 mL/min/1.73m2 Final    Effective December 21, 2021 eGFRcr in adults is calculated using the 2021 CKD-EPI creatinine equation which includes age and gender (Petra et al., NE, DOI: 10.1056/WJIFds6477499)     Calcium Ionized 04/22/2022 4.8  4.4 - 5.2 mg/dL Final     Hepatitis C Antibody 04/22/2022 Nonreactive  Nonreactive Final     WBC Count 04/22/2022 13.2 (A) 4.0 - 11.0 10e3/uL Final     RBC Count 04/22/2022 4.12  3.80 - 5.20 10e6/uL Final     Hemoglobin 04/22/2022 12.2  11.7 - 15.7 g/dL Final     Hematocrit 04/22/2022 37.3  35.0 - 47.0 % Final     MCV 04/22/2022 91  78 - 100 fL Final     MCH 04/22/2022 29.6  26.5 - 33.0 pg Final     MCHC 04/22/2022 32.7  31.5 - 36.5 g/dL Final     RDW 04/22/2022 13.2  10.0 - 15.0 % Final     Platelet Count 04/22/2022 334  150 - 450 10e3/uL Final     % Neutrophils 04/22/2022 87  % Final     % Lymphocytes 04/22/2022 8  % Final     % Monocytes 04/22/2022 4  % Final     % Eosinophils 04/22/2022 0  % Final     % Basophils 04/22/2022 0  % Final     % Immature Granulocytes 04/22/2022 1  % Final     NRBCs per 100 WBC 04/22/2022 0  <1 /100 Final     Absolute Neutrophils 04/22/2022 11.4 (A) 1.6 - 8.3 10e3/uL Final     Absolute  Lymphocytes 04/22/2022 1.1  0.8 - 5.3 10e3/uL Final     Absolute Monocytes 04/22/2022 0.5  0.0 - 1.3 10e3/uL Final     Absolute Eosinophils 04/22/2022 0.1  0.0 - 0.7 10e3/uL Final     Absolute Basophils 04/22/2022 0.0  0.0 - 0.2 10e3/uL Final     Absolute Immature Granulocytes 04/22/2022 0.1  <=0.4 10e3/uL Final     Absolute NRBCs 04/22/2022 0.0  10e3/uL Final         ASSESSMENT:    #1  Multifocal Papillary thyroid cancer (T3, N0, Mx) , status post right hemithyroidectomy and isthumusectomy in November 2019-4.3 cm in size with 10% possibly poorly differentiated - now s/p completion thyroidectomy on 4/12/22  Discussed with patient that her remaining left thyroid did not show any evidence for malignancy.  Extensive discussion with patient about radioactive iodine ablation.  Although this could be considered, given that she has had completion thyroidectomy with no evidence for residual disease on the left side, continued observation would be reasonable.  Although my preference probably would be ablative treatment with I-131, given her other symptoms currently, I think it is reasonable to wait.  Indeed, if we do consider radioactive iodine ablation, it would be at least 2 to 3 months after surgery to give her more time to heal.    I discussed with the patient the options on [id number] we would recommend thyroglobulin as well as neck ultrasound.  We will do the thyroglobulin every 6 months and a neck ultrasound once per year.  If she does decide for the radioactive iodine, we would also do a whole-body scan and uptake.  However the most important work has been done which is a completion thyroidectomy.    We will see what is her ultimate thyroid hormone program.  Currently she feels much better.  We will have patient check labs next week.  My preference is that she stop her Cytomel after 1 month if you can continue just on the levothyroxine.  If she does not feel well just on the levothyroxine, we can always add  the Cytomel back.  I discussed with her the possibility of [facility] Thyroid, however there are some variability with [facility] Thyroid that both she and I would like to minimize.    Patient giving a standing order to check thyroid function at any time at local [place] [facility].    #2 Abdominal pain noted post-operatively  This has improved.    #3 throat soreness for 2 days  Not discussed today    #4 left shoulder pain  Improved    #5 dyspnea  Improved.    Patient to do labs in 1 week, plan for return visit in 6 months.    Sincerely,    Marzena Lagunas MD

## 2022-04-29 NOTE — PATIENT INSTRUCTIONS
"Let me know if you would like a visit sooner to talk about radioactive iodine.  However at this point my priority is for you to heal up well after your surgery and to make sure you are on a good thyroid hormone program.    If you feel \"off\" -feel free to check your thyroid hormone at your local [place] clinic.  I have a standing order that you can just call your local [place] clinic to schedule.  "

## 2022-05-01 NOTE — TELEPHONE ENCOUNTER
potassium chloride ER (MICRO-K) 10 MEQ CR capsule      Last Written Prescription Date:  4-22-22  Last Fill Quantity: 14,   # refills: 0  Last Office Visit : 4-22-22  Future Office visit:  8-12-22    See lab result tab:4-22-22  Routing refill request to provider for review/approval because:  Abnormal K,    No future lab APPT

## 2022-05-02 ENCOUNTER — TELEPHONE (OUTPATIENT)
Dept: ENDOCRINOLOGY | Facility: CLINIC | Age: 32
End: 2022-05-02
Payer: COMMERCIAL

## 2022-05-02 RX ORDER — POTASSIUM CHLORIDE 750 MG/1
CAPSULE, EXTENDED RELEASE ORAL
Qty: 14 CAPSULE | Refills: 0 | OUTPATIENT
Start: 2022-05-02

## 2022-05-02 NOTE — TELEPHONE ENCOUNTER
IZA for patient to call and schedule her endocrinology follow up. Please schedule with Dr. Lagunas around the end of October.  Daisy Sebastian CMA

## 2022-05-24 NOTE — TELEPHONE ENCOUNTER
FYI to provider - Patient is lost to pap tracking follow-up. Attempts to contact pt have been made per reminder process and there has been no reply and/or no appt scheduled. Contact hx listed below.   02/25/21 NIL Pap, + HR HPV (not 16 or 18). Plan cotest in 1 year due 02/25/22.   03/4/21 Msg left on voice mail to return call or advised patient to review results and recommendations that were released through [employer].  03/09/21 Left message for patient to call back or informed pt to view [employer] message.  03/16/21 Letter sent. Pt didn't return the call or view [employer] message.   02/09/22 Reminder Rebeccahart, Letter  3/28/22 Pre-op appt -- added to visit notes pap due in case it can be done then  04/29/22 Reminder call-lm  5/24/22 Lost to follow-up for pap tracking

## 2022-05-27 ENCOUNTER — OFFICE VISIT (OUTPATIENT)
Dept: OTOLARYNGOLOGY | Facility: CLINIC | Age: 32
End: 2022-05-27
Payer: COMMERCIAL

## 2022-05-27 VITALS
HEIGHT: 67 IN | DIASTOLIC BLOOD PRESSURE: 81 MMHG | WEIGHT: 151 LBS | HEART RATE: 57 BPM | TEMPERATURE: 97.8 F | SYSTOLIC BLOOD PRESSURE: 132 MMHG | BODY MASS INDEX: 23.7 KG/M2

## 2022-05-27 DIAGNOSIS — T81.41XA POSTOPERATIVE STITCH ABSCESS: ICD-10-CM

## 2022-05-27 DIAGNOSIS — E89.0 S/P THYROIDECTOMY: Primary | ICD-10-CM

## 2022-05-27 PROCEDURE — 99202 OFFICE O/P NEW SF 15 MIN: CPT | Mod: 24 | Performed by: REGISTERED NURSE

## 2022-05-27 ASSESSMENT — PAIN SCALES - GENERAL: PAINLEVEL: NO PAIN (0)

## 2022-05-27 NOTE — NURSING NOTE
"Chief Complaint   Patient presents with     RECHECK       Discharge at   incision site from thyroidectomy        Blood pressure 132/81, pulse 57, temperature 97.8  F (36.6  C), height 1.702 m (5' 7\"), weight 68.5 kg (151 lb), not currently breastfeeding.    Jero Cadena LPN    "

## 2022-05-27 NOTE — PROGRESS NOTES
May 27, 2022    Prior Oncologic History: Yumiko Marquez is a 31 year old female with a history of a right papillary thyroid carcinoma s/p right thyroid lobectomy in 11/2019. Because the patient had concerns of nodules on the left side as well as the large papillary thyroid carcinoma, it was recommended she have a completion thyroidectomy. This completion thyroidectomy was performed by Dr. Camargo on 4/12/22. Pathology demonstrated thyroid tissue with no malignancy identified. Was seen for a 2 week post op visit by Dr. Camargo on 4/25/22.     Interval History:   Patient comes in today for assessment of her incision. Reports that incision began to look inflamed a couple of days ago with small amount of purulent drainage. Noticed a stitch abscess on 5/24/22 and was recommended to come to clinic for further assessment.    Today, patient reports inflammation of right lateral incision has improved. Can feel a stitch on that side. Denies any significant pain or drainage.     Past Medical History:  Past Medical History:   Diagnosis Date     Cervical high risk HPV (human papillomavirus) test positive 02/25/2021 02/25/21     Complication of anesthesia      History of ITP 1995     PONV (postoperative nausea and vomiting)        Past Surgical History:  Past Surgical History:   Procedure Laterality Date     APPENDECTOMY  2001     THYROIDECTOMY Right 11/26/2019    Procedure: Right Thyroidectomy and Isthmusectomy;  Surgeon: Olivia Camargo MD;  Location: [facility] OR     THYROIDECTOMY N/A 4/12/2022    Procedure: completion thyroidectomy;  Surgeon: Olivia Camargo MD;  Location: [facility] OR     TONSILLECTOMY         Medications:  Current Outpatient Medications   Medication Sig Dispense Refill     DULoxetine (CYMBALTA) 30 MG capsule Take 1 capsule (30 mg) by mouth daily (Patient not taking: No sig reported) 30 capsule 1     levothyroxine (SYNTHROID/LEVOTHROID) 125 MCG tablet Take 1 tablet (125 mcg) by mouth daily 90  tablet 3     liothyronine (CYTOMEL) 5 MCG tablet 1 tablet twice daily (on top of levothyroxine) 60 tablet 1     nabumetone (RELAFEN) 500 MG tablet Take 1 tablet (500 mg) by mouth 2 times daily 60 tablet 1     potassium chloride ER (MICRO-K) 10 MEQ CR capsule 2 tablet daily for 1 week 14 capsule 0     spironolactone (ALDACTONE) 50 MG tablet Take 1 tablet (50 mg) by mouth daily (Patient not taking: No sig reported) 90 tablet 0       Allergies:  No Known Allergies     Social History:  Social History     Tobacco Use     Smoking status: Never Smoker     Smokeless tobacco: Never Used   Substance Use Topics     Alcohol use: Yes     Comment: very litter      Drug use: Never     ROS: 10 point ROS neg other than the symptoms noted above in the HPI.    Physical Exam:    There were no vitals taken for this visit.  Wt Readings from Last 3 Encounters:   04/25/22 69.9 kg (154 lb)   04/21/22 69 kg (152 lb 0.5 oz)   04/12/22 68.9 kg (152 lb)        Constitutional:  The patient was unaccompanied, well-groomed, and in no acute distress.     Neurologic: Alert and oriented x 3. Voice normal.   Psychiatric: The patient's affect was calm, cooperative, and appropriate.   Communication:  Normal; communicates verbally, normal voice quality.    Respiratory: Breathing comfortably without stridor or exertion of accessory muscles.   Neck: Well healed thyroidectomy incision. Slight redness on lateral edges without drainage or edema. Small subcutaneous stitch on right lateral portion of incision removed with alligator forceps. No bleeding at site of extruded stitch.     Labs and Imaging Reviewed:  Pathology:   4/12/22  Final Diagnosis   A.  THYROID GLAND, LEFT LOBE, COMPLETION THYROIDECTOMY:  -Thyroid tissue with no pathologic abnormality.  -No evidence of malignancy identified.     Lab:  TSH   Date Value Ref Range Status   04/22/2022 6.97 (H) 0.40 - 4.00 mU/L Final   01/02/2020 2.19 0.40 - 4.00 mU/L Final       Assessment/Plan:  Patient is a  little over 1 month from completion thyroidectomy by Dr. Camargo. Patient likely had a stitch abscess that has now resolved. Incision appears well healed with one extruded stitch that was removed today. No evidence of any further extruded stitches. Encouraged patient to continue to monitor incision and contact clinic with any further concerns.    Follow up as instructed by Dr. Camargo.     Maria Luisa Lee DNP, APRN, CNP  Otolaryngology  Head & Neck Surgery  [phone]    20 minutes spent on the date of the encounter doing chart review, history and exam, documentation and further activities per the note.

## 2022-05-27 NOTE — LETTER
5/27/2022       RE: Yumiko Marquez  [address]  [facility] [address]     Dear Colleague,    Thank you for referring your patient, Yumiko Marquez, to the [facility] EAR [facility]SE [facility] THROAT [facility]INIC [place] at [facility]. Please see a copy of my visit note below.    May 27, 2022    Prior Oncologic History: Yumiko Marquez is a 31 year old female with a history of a right papillary thyroid carcinoma s/p right thyroid lobectomy in 11/2019. Because the patient had concerns of nodules on the left side as well as the large papillary thyroid carcinoma, it was recommended she have a completion thyroidectomy. This completion thyroidectomy was performed by Dr. Camargo on 4/12/22. Pathology demonstrated thyroid tissue with no malignancy identified. Was seen for a 2 week post op visit by Dr. Camargo on 4/25/22.     Interval History:   Patient comes in today for assessment of her incision. Reports that incision began to look inflamed a couple of days ago with small amount of purulent drainage. Noticed a stitch abscess on 5/24/22 and was recommended to come to clinic for further assessment.    Today, patient reports inflammation of right lateral incision has improved. Can feel a stitch on that side. Denies any significant pain or drainage.     Past Medical History:  Past Medical History:   Diagnosis Date     Cervical high risk HPV (human papillomavirus) test positive 02/25/2021 02/25/21     Complication of anesthesia      History of ITP 1995     PONV (postoperative nausea and vomiting)        Past Surgical History:  Past Surgical History:   Procedure Laterality Date     APPENDECTOMY  2001     THYROIDECTOMY Right 11/26/2019    Procedure: Right Thyroidectomy and Isthmusectomy;  Surgeon: Olivia Camargo MD;  Location: [facility] OR     THYROIDECTOMY N/A 4/12/2022    Procedure: completion thyroidectomy;  Surgeon: Olivia Camargo MD;  Location: [facility] OR      TONSILLECTOMY         Medications:  Current Outpatient Medications   Medication Sig Dispense Refill     DULoxetine (CYMBALTA) 30 MG capsule Take 1 capsule (30 mg) by mouth daily (Patient not taking: No sig reported) 30 capsule 1     levothyroxine (SYNTHROID/LEVOTHROID) 125 MCG tablet Take 1 tablet (125 mcg) by mouth daily 90 tablet 3     liothyronine (CYTOMEL) 5 MCG tablet 1 tablet twice daily (on top of levothyroxine) 60 tablet 1     nabumetone (RELAFEN) 500 MG tablet Take 1 tablet (500 mg) by mouth 2 times daily 60 tablet 1     potassium chloride ER (MICRO-K) 10 MEQ CR capsule 2 tablet daily for 1 week 14 capsule 0     spironolactone (ALDACTONE) 50 MG tablet Take 1 tablet (50 mg) by mouth daily (Patient not taking: No sig reported) 90 tablet 0       Allergies:  No Known Allergies     Social History:  Social History     Tobacco Use     Smoking status: Never Smoker     Smokeless tobacco: Never Used   Substance Use Topics     Alcohol use: Yes     Comment: very litter      Drug use: Never     ROS: 10 point ROS neg other than the symptoms noted above in the HPI.    Physical Exam:    There were no vitals taken for this visit.  Wt Readings from Last 3 Encounters:   04/25/22 69.9 kg (154 lb)   04/21/22 69 kg (152 lb 0.5 oz)   04/12/22 68.9 kg (152 lb)        Constitutional:  The patient was unaccompanied, well-groomed, and in no acute distress.     Neurologic: Alert and oriented x 3. Voice normal.   Psychiatric: The patient's affect was calm, cooperative, and appropriate.   Communication:  Normal; communicates verbally, normal voice quality.    Respiratory: Breathing comfortably without stridor or exertion of accessory muscles.   Neck: Well healed thyroidectomy incision. Slight redness on lateral edges without drainage or edema. Small subcutaneous stitch on right lateral portion of incision removed with alligator forceps. No bleeding at site of extruded stitch.     Labs and Imaging Reviewed:  Pathology:    4/12/22  Final Diagnosis   A.  THYROID GLAND, LEFT LOBE, COMPLETION THYROIDECTOMY:  -Thyroid tissue with no pathologic abnormality.  -No evidence of malignancy identified.     Lab:  TSH   Date Value Ref Range Status   04/22/2022 6.97 (H) 0.40 - 4.00 mU/L Final   01/02/2020 2.19 0.40 - 4.00 mU/L Final     Assessment/Plan:  Patient is a little over 1 month from completion thyroidectomy by Dr. Camargo. Patient likely had a stitch abscess that has now resolved. Incision appears well healed with one extruded stitch that was removed today. No evidence of any further extruded stitches. Encouraged patient to continue to monitor incision and contact clinic with any further concerns.    Follow up as instructed by Dr. Camargo.     Maria Luisa Lee DNP, APRN, CNP  Otolaryngology  Head & Neck Surgery  [phone]    20 minutes spent on the date of the encounter doing chart review, history and exam, documentation and further activities per the note.

## 2022-06-20 ENCOUNTER — MYC MEDICAL ADVICE (OUTPATIENT)
Dept: FAMILY MEDICINE | Facility: CLINIC | Age: 32
End: 2022-06-20
Payer: COMMERCIAL

## 2022-06-20 ENCOUNTER — TELEPHONE (OUTPATIENT)
Dept: ENDOCRINOLOGY | Facility: CLINIC | Age: 32
End: 2022-06-20
Payer: COMMERCIAL

## 2022-06-20 NOTE — TELEPHONE ENCOUNTER
----- Message from Marzena Lagunas MD sent at 6/20/2022  9:54 AM CDT -----  Please call pt's pharmacy to cancel T3 - I have cancelled in EPIC

## 2022-06-23 DIAGNOSIS — C73 PAPILLARY THYROID CARCINOMA (H): ICD-10-CM

## 2022-06-23 DIAGNOSIS — F41.9 ANXIETY: ICD-10-CM

## 2022-06-25 NOTE — PROGRESS NOTES
Yumiko is a 31 year old who is being evaluated via a billable video visit.      How would you like to obtain your AVS? MyChart  If the video visit is dropped, the invitation should be resent by: Send to e-mail at: [email or web address]  Will anyone else be joining your video visit? No        Assessment & Plan     Birth control counseling  Trial of new med - reviewed risks benefits for this and options  - etonogestrel-ethinyl estradiol (NUVARING) 0.12-0.015 MG/24HR vaginal ring; Place 1 each vaginally every 28 days    Prescription drug management  15 minutes spent on the date of the encounter doing chart review, history and exam, documentation and further activities per the note       See Patient Instructions    No follow-ups on file.    Aleta Holder MD  [facility]   Yumiko is a 31 year old, presenting for the following health issues:  No chief complaint on file.      History of Present Illness       Reason for visit:  Discuss Birth Control options    She eats 4 or more servings of fruits and vegetables daily.She consumes 0 sweetened beverage(s) daily.She exercises with enough effort to increase her heart rate 30 to 60 minutes per day.  She exercises with enough effort to increase her heart rate 5 days per week.   She is taking medications regularly.       PATIENT IS DUE FOR PHYSICAL/PAP - Already Scheduled       Had her thyroid removed  Difficulty with constipation and has had meds adjusted for this  Needs tsh levels checked and a lab only visit      Patient would like to talk to provider regarding birth control options   Did not like paragard - lots of bleeding    Thinks nuvaring or a marquez pill would be good          Review of Systems   Constitutional, HEENT, cardiovascular, pulmonary, gi and gu systems are negative, except as otherwise noted.      Objective           Vitals:  No vitals were obtained today due to virtual visit.    Physical Exam   GENERAL: Healthy,  alert and no distress  EYES: Eyes grossly normal to inspection.  No discharge or erythema, or obvious scleral/conjunctival abnormalities.  RESP: No audible wheeze, cough, or visible cyanosis.  No visible retractions or increased work of breathing.    SKIN: Visible skin clear. No significant rash, abnormal pigmentation or lesions.  NEURO: Cranial nerves grossly intact.  Mentation and speech appropriate for age.  PSYCH: Mentation appears normal, affect normal/bright, judgement and insight intact, normal speech and appearance well-groomed.                Video-Visit Details    Video Start Time: 1245    Type of service:  Video Visit    Video End Time:102    Originating Location (pt. Location): Home    Distant Location (provider location):  [facility]     Platform used for Video Visit: Chumby    .  ..

## 2022-06-27 RX ORDER — DULOXETIN HYDROCHLORIDE 30 MG/1
30 CAPSULE, DELAYED RELEASE ORAL DAILY
Qty: 30 CAPSULE | Refills: 1 | OUTPATIENT
Start: 2022-06-27

## 2022-06-27 RX ORDER — NABUMETONE 500 MG/1
500 TABLET, FILM COATED ORAL 2 TIMES DAILY
Qty: 60 TABLET | Refills: 1 | OUTPATIENT
Start: 2022-06-27

## 2022-06-27 NOTE — TELEPHONE ENCOUNTER
nabumetone (RELAFEN) 500 MG tablet      Last Written Prescription Date:  4-22-22  Last Fill Quantity: 60,   # refills: 1  Last Office Visit : 4-29-22  Future Office visit:  8-12-22    Routing refill request to provider for review/approval because:  Med not on Endo protocol    DULoxetine (CYMBALTA) 30 MG capsule      Last Written Prescription Date:  4-25-22  Last Fill Quantity: 30,   # refills: 1      Routing refill request to provider for review/approval because:  Med not on Endo protocol

## 2022-06-29 ENCOUNTER — VIRTUAL VISIT (OUTPATIENT)
Dept: FAMILY MEDICINE | Facility: CLINIC | Age: 32
End: 2022-06-29
Payer: COMMERCIAL

## 2022-06-29 DIAGNOSIS — Z30.09 BIRTH CONTROL COUNSELING: Primary | ICD-10-CM

## 2022-06-29 PROCEDURE — 99213 OFFICE O/P EST LOW 20 MIN: CPT | Mod: 95 | Performed by: FAMILY MEDICINE

## 2022-06-29 RX ORDER — ETONOGESTREL AND ETHINYL ESTRADIOL VAGINAL RING .015; .12 MG/D; MG/D
1 RING VAGINAL
Qty: 3 EACH | Refills: 3 | Status: SHIPPED | OUTPATIENT
Start: 2022-06-29 | End: 2024-01-04

## 2022-06-30 ENCOUNTER — LAB (OUTPATIENT)
Dept: LAB | Facility: CLINIC | Age: 32
End: 2022-06-30
Payer: COMMERCIAL

## 2022-06-30 DIAGNOSIS — C73 PAPILLARY THYROID CARCINOMA (H): ICD-10-CM

## 2022-06-30 LAB
ERYTHROCYTE [DISTWIDTH] IN BLOOD BY AUTOMATED COUNT: 13.8 % (ref 10–15)
HCT VFR BLD AUTO: 41.7 % (ref 35–47)
HGB BLD-MCNC: 14 G/DL (ref 11.7–15.7)
MCH RBC QN AUTO: 30.8 PG (ref 26.5–33)
MCHC RBC AUTO-ENTMCNC: 33.6 G/DL (ref 31.5–36.5)
MCV RBC AUTO: 92 FL (ref 78–100)
PLATELET # BLD AUTO: 415 10E3/UL (ref 150–450)
RBC # BLD AUTO: 4.55 10E6/UL (ref 3.8–5.2)
T3 SERPL-MCNC: 68 NG/DL (ref 85–202)
WBC # BLD AUTO: 8.1 10E3/UL (ref 4–11)

## 2022-06-30 PROCEDURE — 80048 BASIC METABOLIC PNL TOTAL CA: CPT

## 2022-06-30 PROCEDURE — 84443 ASSAY THYROID STIM HORMONE: CPT

## 2022-06-30 PROCEDURE — 84480 ASSAY TRIIODOTHYRONINE (T3): CPT

## 2022-06-30 PROCEDURE — 84439 ASSAY OF FREE THYROXINE: CPT

## 2022-06-30 PROCEDURE — 36415 COLL VENOUS BLD VENIPUNCTURE: CPT

## 2022-06-30 PROCEDURE — 85027 COMPLETE CBC AUTOMATED: CPT

## 2022-06-30 NOTE — LETTER
Patient:  Yumiko Marquez  :   1990  MRN:     [id number]        Ms.Valerie ENRIQUE Marquez  [address]  [facility] [address]        2022    Dear Yumiko    Here are your labs which show that she might need a little bit more thyroid hormone-lets have you take the levothyroxine at 125 mcg daily 6 days a week and 2 tablets (250 mcg daily) for 1 day/week.  We will plan on rechecking your labs before your return visit.    If you have any questions, please feel free to contact my nurse at [phone] select option #3 for triage nurse  or  option #1 for scheduling related questions.    Regards    Marzena Lagunas MD       Resulted Orders   TSH   Result Value Ref Range    TSH 11.26 (H) 0.40 - 4.00 mU/L   T4 free   Result Value Ref Range    Free T4 1.02 0.76 - 1.46 ng/dL   T3 total   Result Value Ref Range    T3 Total 68 (L) 85 - 202 ng/dL   CBC with platelets   Result Value Ref Range    WBC Count 8.1 4.0 - 11.0 10e3/uL    RBC Count 4.55 3.80 - 5.20 10e6/uL    Hemoglobin 14.0 11.7 - 15.7 g/dL    Hematocrit 41.7 35.0 - 47.0 %    MCV 92 78 - 100 fL    MCH 30.8 26.5 - 33.0 pg    MCHC 33.6 31.5 - 36.5 g/dL    RDW 13.8 10.0 - 15.0 %    Platelet Count 415 150 - 450 10e3/uL   Basic metabolic panel   Result Value Ref Range    Sodium 137 133 - 144 mmol/L    Potassium 4.2 3.4 - 5.3 mmol/L    Chloride 105 94 - 109 mmol/L    Carbon Dioxide (CO2) 24 20 - 32 mmol/L    Anion Gap 8 3 - 14 mmol/L    Urea Nitrogen 10 7 - 30 mg/dL    Creatinine 0.73 0.52 - 1.04 mg/dL    Calcium 9.0 8.5 - 10.1 mg/dL    Glucose 85 70 - 99 mg/dL    GFR Estimate >90 >60 mL/min/1.73m2      Comment:      Effective 2021 eGFRcr in adults is calculated using the  CKD-EPI creatinine equation which includes age and gender (Petra et al., NEJM, DOI: 10.1056/ZGGRqs5284903)       Lab Up

## 2022-07-01 LAB
ANION GAP SERPL CALCULATED.3IONS-SCNC: 8 MMOL/L (ref 3–14)
BUN SERPL-MCNC: 10 MG/DL (ref 7–30)
CALCIUM SERPL-MCNC: 9 MG/DL (ref 8.5–10.1)
CHLORIDE BLD-SCNC: 105 MMOL/L (ref 94–109)
CO2 SERPL-SCNC: 24 MMOL/L (ref 20–32)
CREAT SERPL-MCNC: 0.73 MG/DL (ref 0.52–1.04)
GFR SERPL CREATININE-BSD FRML MDRD: >90 ML/MIN/1.73M2
GLUCOSE BLD-MCNC: 85 MG/DL (ref 70–99)
POTASSIUM BLD-SCNC: 4.2 MMOL/L (ref 3.4–5.3)
SODIUM SERPL-SCNC: 137 MMOL/L (ref 133–144)
T4 FREE SERPL-MCNC: 1.02 NG/DL (ref 0.76–1.46)
TSH SERPL DL<=0.005 MIU/L-ACNC: 11.26 MU/L (ref 0.4–4)

## 2022-07-05 ENCOUNTER — TELEPHONE (OUTPATIENT)
Dept: ENDOCRINOLOGY | Facility: CLINIC | Age: 32
End: 2022-07-05

## 2022-07-05 DIAGNOSIS — C73 PAPILLARY THYROID CARCINOMA (H): Primary | ICD-10-CM

## 2022-07-05 NOTE — RESULT ENCOUNTER NOTE
Dear Yumiko    Here are your labs which show that she might need a little bit more thyroid hormone-lets have you take the levothyroxine at 125 mcg daily 6 days a week and 2 tablets (250 mcg daily) for 1 day/week.  We will plan on rechecking your labs before your return visit.    If you have any questions, please feel free to contact my nurse at [phone] select option #3 for triage nurse  or  option #1 for scheduling related questions.    Regards    Marzena Lagunas MD

## 2022-07-05 NOTE — TELEPHONE ENCOUNTER
Pt needs more thyroid hormone    -  Dear Yumiko    Here are your labs which show that she might need a little bit more thyroid hormone-lets have you take the levothyroxine at 125 mcg daily 6 days a week and 2 tablets (250 mcg daily) for 1 day/week.  We will plan on rechecking your labs before your return visit.    If you have any questions, please feel free to contact my nurse at [phone] select option #3 for triage nurse  or  option #1 for scheduling related questions.    Regards    Marzena Lagunas MD     Lab on 06/30/2022   Component Date Value Ref Range Status     TSH 06/30/2022 11.26 (A) 0.40 - 4.00 mU/L Final     Free T4 06/30/2022 1.02  0.76 - 1.46 ng/dL Final     T3 Total 06/30/2022 68 (A) 85 - 202 ng/dL Final     WBC Count 06/30/2022 8.1  4.0 - 11.0 10e3/uL Final     RBC Count 06/30/2022 4.55  3.80 - 5.20 10e6/uL Final     Hemoglobin 06/30/2022 14.0  11.7 - 15.7 g/dL Final     Hematocrit 06/30/2022 41.7  35.0 - 47.0 % Final     MCV 06/30/2022 92  78 - 100 fL Final     MCH 06/30/2022 30.8  26.5 - 33.0 pg Final     MCHC 06/30/2022 33.6  31.5 - 36.5 g/dL Final     RDW 06/30/2022 13.8  10.0 - 15.0 % Final     Platelet Count 06/30/2022 415  150 - 450 10e3/uL Final     Sodium 06/30/2022 137  133 - 144 mmol/L Final     Potassium 06/30/2022 4.2  3.4 - 5.3 mmol/L Final     Chloride 06/30/2022 105  94 - 109 mmol/L Final     Carbon Dioxide (CO2) 06/30/2022 24  20 - 32 mmol/L Final     Anion Gap 06/30/2022 8  3 - 14 mmol/L Final     Urea Nitrogen 06/30/2022 10  7 - 30 mg/dL Final     Creatinine 06/30/2022 0.73  0.52 - 1.04 mg/dL Final     Calcium 06/30/2022 9.0  8.5 - 10.1 mg/dL Final     Glucose 06/30/2022 85  70 - 99 mg/dL Final     GFR Estimate 06/30/2022 >90  >60 mL/min/1.73m2 Final    Effective December 21, 2021 eGFRcr in adults is calculated using the 2021 CKD-EPI creatinine equation which includes age and gender (Petra hahn al., NEJM, DOI: 10.1056/NGNLsc1266181)

## 2022-07-06 DIAGNOSIS — L68.9 EXCESSIVE HAIR GROWTH: ICD-10-CM

## 2022-07-06 RX ORDER — SPIRONOLACTONE 50 MG/1
50 TABLET, FILM COATED ORAL DAILY
Qty: 90 TABLET | Refills: 0 | Status: SHIPPED | OUTPATIENT
Start: 2022-07-06 | End: 2022-08-11

## 2022-07-06 NOTE — TELEPHONE ENCOUNTER
Prescription approved per [facility] Refill Protocol.  Brittani TIM RN

## 2022-08-11 ENCOUNTER — OFFICE VISIT (OUTPATIENT)
Dept: FAMILY MEDICINE | Facility: CLINIC | Age: 32
End: 2022-08-11
Payer: COMMERCIAL

## 2022-08-11 VITALS
TEMPERATURE: 97.5 F | HEART RATE: 63 BPM | RESPIRATION RATE: 17 BRPM | OXYGEN SATURATION: 100 % | WEIGHT: 148 LBS | BODY MASS INDEX: 23.18 KG/M2 | SYSTOLIC BLOOD PRESSURE: 112 MMHG | DIASTOLIC BLOOD PRESSURE: 72 MMHG

## 2022-08-11 DIAGNOSIS — Z00.00 ROUTINE GENERAL MEDICAL EXAMINATION AT A HEALTH CARE FACILITY: Primary | ICD-10-CM

## 2022-08-11 DIAGNOSIS — C73 PAPILLARY THYROID CARCINOMA (H): ICD-10-CM

## 2022-08-11 DIAGNOSIS — F41.9 ANXIETY: ICD-10-CM

## 2022-08-11 DIAGNOSIS — Z12.4 CERVICAL CANCER SCREENING: ICD-10-CM

## 2022-08-11 DIAGNOSIS — Z11.3 SCREEN FOR STD (SEXUALLY TRANSMITTED DISEASE): ICD-10-CM

## 2022-08-11 DIAGNOSIS — C73 MALIGNANT NEOPLASM OF THYROID GLAND (H): ICD-10-CM

## 2022-08-11 LAB — T3 SERPL-MCNC: 80 NG/DL (ref 85–202)

## 2022-08-11 PROCEDURE — 86780 TREPONEMA PALLIDUM: CPT | Performed by: FAMILY MEDICINE

## 2022-08-11 PROCEDURE — 87491 CHLMYD TRACH DNA AMP PROBE: CPT | Performed by: FAMILY MEDICINE

## 2022-08-11 PROCEDURE — G0145 SCR C/V CYTO,THINLAYER,RESCR: HCPCS | Performed by: FAMILY MEDICINE

## 2022-08-11 PROCEDURE — 84480 ASSAY TRIIODOTHYRONINE (T3): CPT | Performed by: FAMILY MEDICINE

## 2022-08-11 PROCEDURE — 36415 COLL VENOUS BLD VENIPUNCTURE: CPT | Performed by: FAMILY MEDICINE

## 2022-08-11 PROCEDURE — 99395 PREV VISIT EST AGE 18-39: CPT | Mod: 25 | Performed by: FAMILY MEDICINE

## 2022-08-11 PROCEDURE — 99213 OFFICE O/P EST LOW 20 MIN: CPT | Mod: 25 | Performed by: FAMILY MEDICINE

## 2022-08-11 PROCEDURE — 84443 ASSAY THYROID STIM HORMONE: CPT | Performed by: FAMILY MEDICINE

## 2022-08-11 PROCEDURE — 90715 TDAP VACCINE 7 YRS/> IM: CPT | Performed by: FAMILY MEDICINE

## 2022-08-11 PROCEDURE — 90471 IMMUNIZATION ADMIN: CPT | Performed by: FAMILY MEDICINE

## 2022-08-11 PROCEDURE — 87624 HPV HI-RISK TYP POOLED RSLT: CPT | Performed by: FAMILY MEDICINE

## 2022-08-11 PROCEDURE — 84439 ASSAY OF FREE THYROXINE: CPT | Performed by: FAMILY MEDICINE

## 2022-08-11 PROCEDURE — 87591 N.GONORRHOEAE DNA AMP PROB: CPT | Performed by: FAMILY MEDICINE

## 2022-08-11 PROCEDURE — 87389 HIV-1 AG W/HIV-1&-2 AB AG IA: CPT | Performed by: FAMILY MEDICINE

## 2022-08-11 RX ORDER — LORAZEPAM 0.5 MG/1
0.5 TABLET ORAL DAILY PRN
Qty: 15 TABLET | Refills: 0 | Status: SHIPPED | OUTPATIENT
Start: 2022-08-11 | End: 2023-11-29

## 2022-08-11 ASSESSMENT — ENCOUNTER SYMPTOMS
FREQUENCY: 0
CHILLS: 0
HEMATOCHEZIA: 0
FEVER: 0
NERVOUS/ANXIOUS: 1
DIZZINESS: 0
WEAKNESS: 0
JOINT SWELLING: 0
CONSTIPATION: 0
SHORTNESS OF BREATH: 0
ARTHRALGIAS: 0
HEADACHES: 0
DIARRHEA: 0
SORE THROAT: 0
BREAST MASS: 0
HEMATURIA: 0
NAUSEA: 0
PALPITATIONS: 0
MYALGIAS: 1
DYSURIA: 0
ABDOMINAL PAIN: 0
PARESTHESIAS: 0
EYE PAIN: 0
COUGH: 1
HEARTBURN: 0

## 2022-08-11 ASSESSMENT — PATIENT HEALTH QUESTIONNAIRE - PHQ9
5. POOR APPETITE OR OVEREATING: SEVERAL DAYS
SUM OF ALL RESPONSES TO PHQ QUESTIONS 1-9: 6

## 2022-08-11 ASSESSMENT — ANXIETY QUESTIONNAIRES
6. BECOMING EASILY ANNOYED OR IRRITABLE: SEVERAL DAYS
3. WORRYING TOO MUCH ABOUT DIFFERENT THINGS: SEVERAL DAYS
IF YOU CHECKED OFF ANY PROBLEMS ON THIS QUESTIONNAIRE, HOW DIFFICULT HAVE THESE PROBLEMS MADE IT FOR YOU TO DO YOUR WORK, TAKE CARE OF THINGS AT HOME, OR GET ALONG WITH OTHER PEOPLE: NOT DIFFICULT AT ALL
7. FEELING AFRAID AS IF SOMETHING AWFUL MIGHT HAPPEN: SEVERAL DAYS
GAD7 TOTAL SCORE: 7
GAD7 TOTAL SCORE: 7
2. NOT BEING ABLE TO STOP OR CONTROL WORRYING: SEVERAL DAYS
1. FEELING NERVOUS, ANXIOUS, OR ON EDGE: SEVERAL DAYS
5. BEING SO RESTLESS THAT IT IS HARD TO SIT STILL: SEVERAL DAYS

## 2022-08-11 ASSESSMENT — ACTIVITIES OF DAILY LIVING (ADL): CURRENT_FUNCTION: NO ASSISTANCE NEEDED

## 2022-08-11 NOTE — PROGRESS NOTES
hpv     SUBJECTIVE:   CC: Yumiko Marquez is an 31 year old woman who presents for preventive health visit.       Patient has been advised of split billing requirements and indicates understanding: Yes  Healthy Habits:     Frequency of exercise:  2-3 days/week    Duration of exercise:  45-60 minutes    Taking medications regularly:  Yes    Medication side effects:  None    PHQ-2 Total Score: 1    Additional concerns today:  No    PT would like to discuss about medication (Cymbalta and Aldactone), as well as Birth Control options - tried the nuvaring.  Had a yeast infection last week has had a few partners that are new      Anxiety - worsened since thyroid surgery April 2022    Also left a UA for sti screening               Today's PHQ-2 Score:   PHQ-2 ( 1999 Pfizer) 8/11/2022   Q1: Little interest or pleasure in doing things 0   Q2: Feeling down, depressed or hopeless 1   PHQ-2 Score 1   PHQ-2 Total Score (12-17 Years)- Positive if 3 or more points; Administer PHQ-A if positive -   Q1: Little interest or pleasure in doing things Not at all   Q2: Feeling down, depressed or hopeless Several days   PHQ-2 Score 1       Abuse: Current or Past (Physical, Sexual or Emotional) - No  Do you feel safe in your environment? Yes    Have you ever done Advance Care Planning? (For example, a Health Directive, POLST, or a discussion with a medical provider or your loved ones about your wishes): No, advance care planning information given to patient to review.  Patient declined advance care planning discussion at this time.    Social History     Tobacco Use     Smoking status: Never Smoker     Smokeless tobacco: Never Used   Substance Use Topics     Alcohol use: Yes     Comment: very litter      If you drink alcohol do you typically have >3 drinks per day or >7 drinks per week? No    Alcohol Use 8/11/2022   Prescreen: >3 drinks/day or >7 drinks/week? No   Prescreen: >3 drinks/day or >7 drinks/week? -   No flowsheet data  found.    Reviewed orders with patient.  Reviewed health maintenance and updated orders accordingly - Yes  Lab work is in process    Breast Cancer Screening:    Breast CA Risk Assessment (FHS-7) 2/24/2021   Do you have a family history of breast, colon, or ovarian cancer? No / Unknown       click delete button to remove this line now  Patient under 40 years of age: Routine Mammogram Screening not recommended.   Pertinent mammograms are reviewed under the imaging tab.      PAP / HPV Latest Ref Rng & Units 8/11/2022 2/25/2021   PAP   Negative for Intraepithelial Lesion or Malignancy (NILM) -   PAP (Historical) - - NIL   HPV16 Negative Negative Negative   HPV18 Negative Negative Negative   HRHPV Negative Positive(A) Positive(A)     Reviewed and updated as needed this visit by clinical staff   Tobacco  Allergies  Meds  Problems  Med Hx  Surg Hx  Fam Hx            Reviewed and updated as needed this visit by Provider   Tobacco  Allergies  Meds  Problems  Med Hx  Surg Hx  Fam Hx           Past Medical History:   Diagnosis Date     Cervical high risk HPV (human papillomavirus) test positive 02/25/2021 02/25/21     Complication of anesthesia      History of ITP 1995     PONV (postoperative nausea and vomiting)       Past Surgical History:   Procedure Laterality Date     APPENDECTOMY  2001     THYROIDECTOMY Right 11/26/2019    Procedure: Right Thyroidectomy and Isthmusectomy;  Surgeon: Olivia Camargo MD;  Location: [facility] OR     THYROIDECTOMY N/A 4/12/2022    Procedure: completion thyroidectomy;  Surgeon: Olivia Camargo MD;  Location: [facility]     TONSILLECTOMY         Review of Systems   Constitutional: Negative for chills and fever.   HENT: Negative for congestion, ear pain, hearing loss and sore throat.    Eyes: Negative for pain and visual disturbance.   Respiratory: Positive for cough. Negative for shortness of breath.    Cardiovascular: Negative for chest pain, palpitations and peripheral  edema.   Gastrointestinal: Negative for abdominal pain, constipation, diarrhea, heartburn, hematochezia and nausea.   Breasts:  Negative for tenderness, breast mass and discharge.   Genitourinary: Positive for vaginal discharge. Negative for dysuria, frequency, genital sores, hematuria, pelvic pain, urgency and vaginal bleeding.   Musculoskeletal: Positive for myalgias. Negative for arthralgias and joint swelling.   Skin: Negative for rash.   Neurological: Negative for dizziness, weakness, headaches and paresthesias.   Psychiatric/Behavioral: Positive for mood changes. The patient is nervous/anxious.           OBJECTIVE:   /72   Pulse 63   Temp 97.5  F (36.4  C) (Temporal)   Resp 17   Wt 67.1 kg (148 lb)   LMP 07/22/2022   SpO2 100%   BMI 23.18 kg/m    Physical Exam  GENERAL: healthy, alert and no distress  EYES: Eyes grossly normal to inspection, PERRL and conjunctivae and sclerae normal  HENT: ear canals and TM's normal, nose and mouth without ulcers or lesions  NECK: no adenopathy, no asymmetry, masses, or scars and thyroid normal to palpation  RESP: lungs clear to auscultation - no rales, rhonchi or wheezes  BREAST: normal without masses, tenderness or nipple discharge and no palpable axillary masses or adenopathy  CV: regular rate and rhythm, normal S1 S2, no S3 or S4, no murmur, click or rub, no peripheral edema and peripheral pulses strong  ABDOMEN: soft, nontender, no hepatosplenomegaly, no masses and bowel sounds normal  MS: no gross musculoskeletal defects noted, no edema  SKIN: no suspicious lesions or rashes  NEURO: Normal strength and tone, mentation intact and speech normal  PSYCH: mentation appears normal, affect normal/bright    Diagnostic Test Results:  Labs reviewed in Epic    ASSESSMENT/PLAN:       ICD-10-CM    1. Routine general medical examination at a health care facility  Z00.00 Lipid panel reflex to direct LDL Fasting   2. Cervical cancer screening  Z12.4 Pap Screen with HPV -  "recommended age 30 - 65 years     HPV Hold (Lab Only)     HPV High Risk Types DNA Cervical   3. Malignant neoplasm of thyroid gland (H)  C73    4. Anxiety  F41.9 LORazepam (ATIVAN) 0.5 MG tablet   5. Papillary thyroid carcinoma (H)  C73 T3 total     T4 free     TSH   6. Screen for STD (sexually transmitted disease)  Z11.3 NEISSERIA GONORRHOEA PCR     CHLAMYDIA TRACHOMATIS PCR     HIV Antigen Antibody Combo     Treponema Abs w Reflex to RPR and Titer     NEISSERIA GONORRHOEA PCR     CHLAMYDIA TRACHOMATIS PCR     HIV Antigen Antibody Combo     Treponema Abs w Reflex to RPR and Titer   In addition to the preventive visit, 15 minutes was spent face to face with (>50%) counseling and/or coordination of care regarding addition concerns and symptoms.      Patient has been advised of split billing requirements and indicates understanding: Yes    COUNSELING:  Reviewed preventive health counseling, as reflected in patient instructions    Estimated body mass index is 23.18 kg/m  as calculated from the following:    Height as of 5/27/22: 1.702 m (5' 7\").    Weight as of this encounter: 67.1 kg (148 lb).        She reports that she has never smoked. She has never used smokeless tobacco.      Counseling Resources:  ATP IV Guidelines  Pooled Cohorts Equation Calculator  Breast Cancer Risk Calculator  BRCA-Related Cancer Risk Assessment: FHS-7 Tool  FRAX Risk Assessment  ICSI Preventive Guidelines  Dietary Guidelines for Americans, 2010  USDA's MyPlate  ASA Prophylaxis  Lung CA Screening    Aleta Holder MD  [facility]  "

## 2022-08-11 NOTE — PATIENT INSTRUCTIONS
Preventive Health Recommendations  Female Ages 26 - 39  Yearly exam:   See your health care provider every year in order to  Review health changes.   Discuss preventive care.    Review your medicines if you your doctor has prescribed any.    Until age 30: Get a Pap test every three years (more often if you have had an abnormal result).    After age 30: Talk to your doctor about whether you should have a Pap test every 3 years or have a Pap test with HPV screening every 5 years.   You do not need a Pap test if your uterus was removed (hysterectomy) and you have not had cancer.  You should be tested each year for STDs (sexually transmitted diseases), if you're at risk.   Talk to your provider about how often to have your cholesterol checked.  If you are at risk for diabetes, you should have a diabetes test (fasting glucose).  Shots: Get a flu shot each year. Get a tetanus shot every 10 years.   Nutrition:   Eat at least 5 servings of fruits and vegetables each day.  Eat whole-grain bread, whole-wheat pasta and brown rice instead of white grains and rice.  Get adequate Calcium and Vitamin D.     Lifestyle  Exercise at least 150 minutes a week (30 minutes a day, 5 days of the week). This will help you control your weight and prevent disease.  Limit alcohol to one drink per day.  No smoking.   Wear sunscreen to prevent skin cancer.  See your dentist every six months for an exam and cleaning.  ?Vaccination in individuals > 26 years of age - For most patients >26 years of age, we suggest against catch-up vaccination (Grade 2C), as most individuals in this age range have already been exposed to HPV and vaccination is unlikely to assist in immunity. However, there are some exceptions for whom we do offer vaccination even after age 26 years:    Previously unvaccinated adults aged 27 to 45 years who have a low likelihood of prior HPV exposure (eg, no prior sexual experience or a limited number of prior sexual partners) but  have a future risk of HPV exposure (eg, new sexual partners).    Health care workers who have repeated exposure to HPV in vapors generated during surgical excision or ablation of HPV-associated lesions (eg, health care providers and operating room and office staff in the fields of gynecology, dermatology, and family practice    However, clinicians and patients should be aware that HPV vaccination after age 26 may not be covered by insurance providers or other payers, and this may also affect the decision to vaccinate. (See 'Indications and age range' above and 'Optimal timing' above.)

## 2022-08-11 NOTE — LETTER
Patient:  Yumiko Marquez  :   1990  MRN:     [id number]      Ms.Valerie ENRIQUE Marquez  [address]  [facility] [address]      2022    Dear ,  Dear Yumiko    Here are your labs which are so much better.  That being said, I think we can increase your levothyroxine some more.  Currently, your program translates to roughly 160 mcg daily (1 tablet daily 5 days a week, 2 tablets on ).  Therefore I will change you to levothyroxine 1 tablet daily of the 175 mcg.  This will simplify things immensely.  We will have you recheck your labs in about 2 months to make sure that thyroid is looking good.  We will also check the tumor marker at that time (thyroglobulin).  You do not need to be fasting.  I sent the updated prescription for levothyroxine 175 mcg daily to your pharmacy.  The orders are in the system.  Please call to schedule.    If you have any questions, please feel free to contact my nurse at [phone] select option #3 for triage nurse  or  option #1 for scheduling related questions.    Regards    Marzena Lagunas MD     Resulted Orders   T3 total   Result Value Ref Range    T3 Total 80 (L) 85 - 202 ng/dL   T4 free   Result Value Ref Range    Free T4 1.22 0.76 - 1.46 ng/dL   TSH   Result Value Ref Range    TSH 5.60 (H) 0.40 - 4.00 mU/L       Aleta Holder MD

## 2022-08-12 ENCOUNTER — VIRTUAL VISIT (OUTPATIENT)
Dept: ENDOCRINOLOGY | Facility: CLINIC | Age: 32
End: 2022-08-12
Payer: COMMERCIAL

## 2022-08-12 ENCOUNTER — TELEPHONE (OUTPATIENT)
Dept: ENDOCRINOLOGY | Facility: CLINIC | Age: 32
End: 2022-08-12

## 2022-08-12 DIAGNOSIS — C73 PAPILLARY THYROID CARCINOMA (H): ICD-10-CM

## 2022-08-12 DIAGNOSIS — C73 PAPILLARY THYROID CARCINOMA (H): Primary | ICD-10-CM

## 2022-08-12 LAB
C TRACH DNA SPEC QL NAA+PROBE: NEGATIVE
HIV 1+2 AB+HIV1 P24 AG SERPL QL IA: NONREACTIVE
N GONORRHOEA DNA SPEC QL NAA+PROBE: NEGATIVE
T PALLIDUM AB SER QL: NONREACTIVE
T4 FREE SERPL-MCNC: 1.22 NG/DL (ref 0.76–1.46)
TSH SERPL DL<=0.005 MIU/L-ACNC: 5.6 MU/L (ref 0.4–4)

## 2022-08-12 PROCEDURE — 99214 OFFICE O/P EST MOD 30 MIN: CPT | Mod: 95 | Performed by: INTERNAL MEDICINE

## 2022-08-12 RX ORDER — LEVOTHYROXINE SODIUM 175 UG/1
175 TABLET ORAL DAILY
Qty: 90 TABLET | Refills: 3 | Status: SHIPPED | OUTPATIENT
Start: 2022-08-12 | End: 2022-10-27

## 2022-08-12 RX ORDER — LEVOTHYROXINE SODIUM 125 UG/1
TABLET ORAL
Qty: 90 TABLET | Refills: 3
Start: 2022-08-12 | End: 2022-08-12

## 2022-08-12 NOTE — PROGRESS NOTES
Yumiko Marquez  is being evaluated via a billable video visit.      8/12/22    Start time 9:33, stop time 9:48, chart review, coordination of care, 15 minutes, total time 30 minutes    HPI  #1  Multifocal Papillary thyroid cancer (T3, N0, Mx) , status post right hemithyroidectomy and isthumusectomy in November 2019-4.3 cm in size with 10% possibly poorly differentiated - now s/p completion thyroidectomy on 4/12/22  In 2019, the pt had  An ultrasound thyroid demonstrated multiple nodules in the right thyroid lobe, the largest was 3.7cm. 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  She underwent right thyroidectomy and isthmusectomy by Dr. Camargo on 11/26/2019.  Surgical pathology showed 4.3 cm papillary thyroid cancer, unifocal with clear margin.  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 The papillary thyroid cancer was well-circumscribed and encapsulated with predominantly follicular growth pattern.  However the multifocal growth of tall cells and a oncocytic changes and mitosis is focally increased up to 5 per 10 high-power fields.  The findings are consistent with focal poorly differentiated thyroid carcinoma of 10% however there was no capsular or angiolymphatic invasion.  Therefore this tumor is most likely not behave in an aggressive fashion.  No lymph node was removed.  Tumor staging pT3aNx.   She was seen by endocrinology in 2020-completion thyroidectomy was recommended however the patient had deferred due to concerns about need for lifelong levothyroxine replacement.  February 2022 neck ultrasound shows possibly enlarging nodule on the left side (around 5 mm). February 2022 TSH each normal, thyroglobulin around 19.6 (still has remaining left thyroid)    Pt completion thyroidectomy on 4/12/22. Pathology showed no malignancy in remaining thyroid.  She was discharged on levothyroxine at 112 mcg daily.      Interval history: In April 2022, pt felt poorly that with  herTSH at 6.97, low T3 at 54, low free T3 at 1.5, and normal free T4 at 0.94.  Her potassium was also low at 3.2 and her white count was slightly higher at 11.4. Pt increased to levothyroxine at 125 mcg daily. Recheck TSH on 7/22 at 11.26 and I asked pt to increase to 125 mcg daily, 5 days per week and 250 mcg daily, 2 days per week.  Pt reports some fatigue but overall feeling well.  She has lost about 10 pounds.  She reports regular menses.  She reports regular exercise.    #2 Abdominal pain noted post-operatively  Patient reports some bloating of her abdomen, generalized pain, postoperatively.  She did take about 4 tablets of oxycodone for pain control.  She denies any similar symptoms with previous opioid use (last times many years ago).      Interval history: now resolved.  This was thought to be due to her postoperative status.      Past Medical History  Past Medical History:   Diagnosis Date     Cervical high risk HPV (human papillomavirus) test positive 02/25/2021 02/25/21     Complication of anesthesia      History of ITP 1995     PONV (postoperative nausea and vomiting)        Allergies  No Known Allergies  Medications  Current Outpatient Medications   Medication Sig Dispense Refill     etonogestrel-ethinyl estradiol (NUVARING) 0.12-0.015 MG/24HR vaginal ring Place 1 each vaginally every 28 days 3 each 3     levothyroxine (SYNTHROID/LEVOTHROID) 125 MCG tablet Take 1 tablet (125 mcg) by mouth daily 90 tablet 3     LORazepam (ATIVAN) 0.5 MG tablet Take 1 tablet (0.5 mg) by mouth daily as needed for anxiety (panic attacks) 15 tablet 0     nabumetone (RELAFEN) 500 MG tablet Take 1 tablet (500 mg) by mouth 2 times daily 60 tablet 1     Family History  family history includes Heart Disease in her maternal grandfather; Melanoma (age of onset: 60) in her maternal grandmother; Thyroid Disease in her paternal aunt; Thyroid Disease (age of onset: 45) in her maternal grandfather; Valvular heart disease in her  "maternal grandfather.  Social History  Social History     Socioeconomic History     Marital status: Single     Spouse name: Not on file     Number of children: Not on file     Years of education: Not on file     Highest education level: Not on file   Occupational History     Not on file   Tobacco Use     Smoking status: Never Smoker     Smokeless tobacco: Never Used   Substance and Sexual Activity     Alcohol use: Yes     Comment: very litter      Drug use: Never     Sexual activity: Not Currently     Partners: Male     Birth control/protection: Condom   Other Topics Concern     Not on file   Social History Narrative     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  No discharge or erythema, or obvious scleral/conjunctival abnormalities.\",\"RESP: No audible wheeze, cough, or visible cyanosis.  No visible retractions or increased work of breathing.  \",\"SKIN: Visible skin clear. No significant rash, abnormal pigmentation or lesions.\",\"NEURO: Cranial nerves grossly intact.  Mentation and speech appropriate for age.\",\"PSYCH: Mentation appears normal, affect normal/bright, judgement and insight intact, normal speech and appearance well-groomed.    RESULTS  Office Visit on 08/11/2022   Component Date Value Ref Range Status     T3 Total 08/11/2022 80 (A) 85 - 202 ng/dL Final       ASSESSMENT:    #1  Multifocal Papillary thyroid cancer (T3, N0, Mx) , status post right hemithyroidectomy and isthumusectomy in November 2019-4.3 cm in size with 10% possibly poorly " differentiated - now s/p completion thyroidectomy on 4/12/22  Discussed with patient that her remaining left thyroid did not show any evidence for malignancy.  Extensive discussion with patient about radioactive iodine ablation.  Although my preference is that the patient consider radioactive iodine ablation, she would prefer to observe at this time.  She did have a completion thyroidectomy.  I think we will plan on repeat neck ultrasound with thyroglobulin in April 2023, and plan on suppressive thyroid hormone therapy in the interim.  Pending TSH.  Of note, the patient would like to be on once daily tablet of levothyroxine, rather than her current program of 125 mcg daily 5 days a week, and to 50 mcg daily 2 days/week.    Of note, her total T3 is slightly lower at 80-I would not be surprised that she does need more thyroid hormone.  We will contact the patient later today about adjusting her levothyroxine.    #2 Abdominal pain noted post-operatively  This has improved.    We will plan return visit in April 2023 with thyroid ultrasound, TSH, thyroglobulin.

## 2022-08-12 NOTE — TELEPHONE ENCOUNTER
Specimen processing [facility]: states TSH were all transferred from [facility] lab is down and they have received hundreds of specimens in the last 24 hours. The are processing as able.   Provider notified.   Lesli White RN on 8/12/2022 at 1:26 PM       RE    Please call to [facility] and ask what happened to the TSH-phone number [phone].

## 2022-08-12 NOTE — TELEPHONE ENCOUNTER
----- Message from Marzena Lagunas MD sent at 8/12/2022  1:05 PM CDT -----  Please call to [facility] and ask what happened to the TSH-phone number [phone].

## 2022-08-12 NOTE — LETTER
8/12/2022       RE: Yumiko Marquez  [address]  [facility] [address]     Dear Colleague,    Thank you for referring your patient, Yumiko Marquez, to the [facility] [place] [facility]. Please see a copy of my visit note below.    Yumiko Marquez  is being evaluated via a billable video visit.      8/12/22    Start time 9:33, stop time 9:48, chart review, coordination of care, 15 minutes, total time 30 minutes    HPI  #1  Multifocal Papillary thyroid cancer (T3, N0, Mx) , status post right hemithyroidectomy and isthumusectomy in November 2019-4.3 cm in size with 10% possibly poorly differentiated - now s/p completion thyroidectomy on 4/12/22  In 2019, the pt had  An ultrasound thyroid demonstrated multiple nodules in the right thyroid lobe, the largest was 3.7cm. 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  She underwent right thyroidectomy and isthmusectomy by Dr. Camargo on 11/26/2019.  Surgical pathology showed 4.3 cm papillary thyroid cancer, unifocal with clear margin.  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 The papillary thyroid cancer was well-circumscribed and encapsulated with predominantly follicular growth pattern.  However the multifocal growth of tall cells and a oncocytic changes and mitosis is focally increased up to 5 per 10 high-power fields.  The findings are consistent with focal poorly differentiated thyroid carcinoma of 10% however there was no capsular or angiolymphatic invasion.  Therefore this tumor is most likely not behave in an aggressive fashion.  No lymph node was removed.  Tumor staging pT3aNx.   She was seen by endocrinology in 2020-completion thyroidectomy was recommended however the patient had deferred due to concerns about need for lifelong levothyroxine replacement.  February 2022 neck ultrasound shows possibly enlarging nodule on the  left side (around 5 mm). February 2022 TSH each normal, thyroglobulin around 19.6 (still has remaining left thyroid)    Pt completion thyroidectomy on 4/12/22. Pathology showed no malignancy in remaining thyroid.  She was discharged on levothyroxine at 112 mcg daily.      Interval history: In April 2022, pt felt poorly that with herTSH at 6.97, low T3 at 54, low free T3 at 1.5, and normal free T4 at 0.94.  Her potassium was also low at 3.2 and her white count was slightly higher at 11.4. Pt increased to levothyroxine at 125 mcg daily. Recheck TSH on 7/22 at 11.26 and I asked pt to increase to 125 mcg daily, 5 days per week and 250 mcg daily, 2 days per week.  Pt reports some fatigue but overall feeling well.  She has lost about 10 pounds.  She reports regular menses.  She reports regular exercise.    #2 Abdominal pain noted post-operatively  Patient reports some bloating of her abdomen, generalized pain, postoperatively.  She did take about 4 tablets of oxycodone for pain control.  She denies any similar symptoms with previous opioid use (last times many years ago).      Interval history: now resolved.  This was thought to be due to her postoperative status.    Past Medical History  Past Medical History:   Diagnosis Date     Cervical high risk HPV (human papillomavirus) test positive 02/25/2021 02/25/21     Complication of anesthesia      History of ITP 1995     PONV (postoperative nausea and vomiting)        Allergies  No Known Allergies  Medications  Current Outpatient Medications   Medication Sig Dispense Refill     etonogestrel-ethinyl estradiol (NUVARING) 0.12-0.015 MG/24HR vaginal ring Place 1 each vaginally every 28 days 3 each 3     levothyroxine (SYNTHROID/LEVOTHROID) 125 MCG tablet Take 1 tablet (125 mcg) by mouth daily 90 tablet 3     LORazepam (ATIVAN) 0.5 MG tablet Take 1 tablet (0.5 mg) by mouth daily as needed for anxiety (panic attacks) 15 tablet 0     nabumetone (RELAFEN) 500 MG tablet Take 1  "tablet (500 mg) by mouth 2 times daily 60 tablet 1     Family History  family history includes Heart Disease in her maternal grandfather; Melanoma (age of onset: 60) in her maternal grandmother; Thyroid Disease in her paternal aunt; Thyroid Disease (age of onset: 45) in her maternal grandfather; Valvular heart disease in her maternal grandfather.  Social History  Social History     Socioeconomic History     Marital status: Single     Spouse name: Not on file     Number of children: Not on file     Years of education: Not on file     Highest education level: Not on file   Occupational History     Not on file   Tobacco Use     Smoking status: Never Smoker     Smokeless tobacco: Never Used   Substance and Sexual Activity     Alcohol use: Yes     Comment: very litter      Drug use: Never     Sexual activity: Not Currently     Partners: Male     Birth control/protection: Condom   Other Topics Concern     Not on file   Social History Narrative     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  No discharge or erythema, or obvious scleral/conjunctival abnormalities.\",\"RESP: No audible wheeze, cough, or visible cyanosis.  No visible retractions or increased work of breathing.  \",\"SKIN: Visible skin clear. No significant rash, abnormal pigmentation or lesions.\",\"NEURO: Cranial nerves grossly intact.  Mentation and speech appropriate for age.\",\"PSYCH: Mentation appears normal, affect normal/bright, judgement and insight intact, normal speech and appearance " well-groomed.    RESULTS  Office Visit on 08/11/2022   Component Date Value Ref Range Status     T3 Total 08/11/2022 80 (A) 85 - 202 ng/dL Final       ASSESSMENT:    #1  Multifocal Papillary thyroid cancer (T3, N0, Mx) , status post right hemithyroidectomy and isthumusectomy in November 2019-4.3 cm in size with 10% possibly poorly differentiated - now s/p completion thyroidectomy on 4/12/22  Discussed with patient that her remaining left thyroid did not show any evidence for malignancy.  Extensive discussion with patient about radioactive iodine ablation.  Although my preference is that the patient consider radioactive iodine ablation, she would prefer to observe at this time.  She did have a completion thyroidectomy.  I think we will plan on repeat neck ultrasound with thyroglobulin in April 2023, and plan on suppressive thyroid hormone therapy in the interim.  Pending TSH.  Of note, the patient would like to be on once daily tablet of levothyroxine, rather than her current program of 125 mcg daily 5 days a week, and to 50 mcg daily 2 days/week.    Of note, her total T3 is slightly lower at 80-I would not be surprised that she does need more thyroid hormone.  We will contact the patient later today about adjusting her levothyroxine.    #2 Abdominal pain noted post-operatively  This has improved.    We will plan return visit in April 2023 with thyroid ultrasound, TSH, thyroglobulin.    Yumiko Marquez  is being evaluated via a billable video visit.      How would you like to obtain your AVS? MyChart  For the video visit, send the invitation by: Text to cell phone: [phone]   Will anyone else be joining your video visit? No    Marzena Lagunas MD

## 2022-08-12 NOTE — PROGRESS NOTES
Yumiko Marquez  is being evaluated via a billable video visit.      How would you like to obtain your AVS? [email or web address]  For the video visit, send the invitation by: Text to cell phone: [phone]   Will anyone else be joining your video visit? No

## 2022-08-13 NOTE — TELEPHONE ENCOUNTER
We will increase levothyroxine to 175 mcg daily and have her recheck labs in 2 months.    -  Dear Yumiko    Here are your labs which are so much better.  That being said, I think we can increase your levothyroxine some more.  Currently, your program translates to roughly 160 mcg daily (1 tablet daily 5 days a week, 2 tablets on Saturday Sunday).  Therefore I will change you to levothyroxine 1 tablet daily of the 175 mcg.  This will simplify things immensely.  We will have you recheck your labs in about 2 months to make sure that thyroid is looking good.  We will also check the tumor marker at that time (thyroglobulin).  You do not need to be fasting.  I sent the updated prescription for levothyroxine 175 mcg daily to your pharmacy.  The orders are in the system.  Please call to schedule.    If you have any questions, please feel free to contact my nurse at [phone] select option #3 for triage nurse  or  option #1 for scheduling related questions.    Regards    Marzena Lagunas MD     Office Visit on 08/11/2022   Component Date Value Ref Range Status     T3 Total 08/11/2022 80 (A) 85 - 202 ng/dL Final     Free T4 08/11/2022 1.22  0.76 - 1.46 ng/dL Final     TSH 08/11/2022 5.60 (A) 0.40 - 4.00 mU/L Final     Neisseria gonorrhoeae 08/11/2022 Negative  Negative Final    Negative for N. gonorrhoeae rRNA by transcription mediated amplification. A negative result by transcription mediated amplification does not preclude the presence of C. trachomatis infection because results are dependent on proper and adequate collection, absence of inhibitors and sufficient rRNA to be detected.     Chlamydia trachomatis 08/11/2022 Negative  Negative Final    A negative result by transcription mediated amplification does not preclude the presence of C. trachomatis infection because results are dependent on proper and adequate collection, absence of inhibitors and sufficient rRNA to be detected.     HIV Antigen Antibody Combo 08/11/2022  Nonreactive  Nonreactive Final    HIV-1 p24 Ag & HIV-1/HIV-2 Ab Not Detected     Treponema Antibody Total 08/11/2022 Nonreactive  Nonreactive Final

## 2022-08-13 NOTE — RESULT ENCOUNTER NOTE
Dear Yumiko    Here are your labs which are so much better.  That being said, I think we can increase your levothyroxine some more.  Currently, your program translates to roughly 160 mcg daily (1 tablet daily 5 days a week, 2 tablets on Saturday Sunday).  Therefore I will change you to levothyroxine 1 tablet daily of the 175 mcg.  This will simplify things immensely.  We will have you recheck your labs in about 2 months to make sure that thyroid is looking good.  We will also check the tumor marker at that time (thyroglobulin).  You do not need to be fasting.  I sent the updated prescription for levothyroxine 175 mcg daily to your pharmacy.  The orders are in the system.  Please call to schedule.    If you have any questions, please feel free to contact my nurse at [phone] select option #3 for triage nurse  or  option #1 for scheduling related questions.    Regards    Marzena Lagunas MD

## 2022-08-15 LAB
BKR LAB AP GYN ADEQUACY: NORMAL
BKR LAB AP GYN INTERPRETATION: NORMAL
BKR LAB AP HPV REFLEX: NORMAL
BKR LAB AP PREVIOUS ABNL DX: NORMAL
BKR LAB AP PREVIOUS ABNORMAL: NORMAL
PATH REPORT.COMMENTS IMP SPEC: NORMAL
PATH REPORT.COMMENTS IMP SPEC: NORMAL
PATH REPORT.RELEVANT HX SPEC: NORMAL

## 2022-08-18 LAB
HUMAN PAPILLOMA VIRUS 16 DNA: NEGATIVE
HUMAN PAPILLOMA VIRUS 18 DNA: NEGATIVE
HUMAN PAPILLOMA VIRUS FINAL DIAGNOSIS: ABNORMAL
HUMAN PAPILLOMA VIRUS OTHER HR: POSITIVE

## 2022-08-25 ENCOUNTER — PATIENT OUTREACH (OUTPATIENT)
Dept: FAMILY MEDICINE | Facility: CLINIC | Age: 32
End: 2022-08-25

## 2022-08-26 NOTE — PROGRESS NOTES
2 week s/p completions thyroidectomy for PTC.     Since the surgery the patient denies any problems with voice quality, breathing. No sx of hypocalcemia or hypothyroidism.     PE:  Wound is healing well. No evidence of hematoma or infection.    Pathology reviewed with the patient.    Plan:  Reviewed wound management and massage  Dr. Lagunas is following the patients medication and thyroid cancer management hereafter    Olivia Camargo MD

## 2022-09-02 NOTE — RESULT ENCOUNTER NOTE
Hello,    Great news, your results were normal.  No STDs were noted  Very good    Aleta Holder MD

## 2022-09-06 NOTE — TELEPHONE ENCOUNTER
8/11/22 NIL, +HR HPV, not 16/18. Plan updated plan to repeat co-test in 6-9 months, between Feb - May 2023

## 2022-09-18 ENCOUNTER — HEALTH MAINTENANCE LETTER (OUTPATIENT)
Age: 32
End: 2022-09-18

## 2022-10-21 ENCOUNTER — LAB (OUTPATIENT)
Dept: LAB | Facility: CLINIC | Age: 32
End: 2022-10-21
Payer: COMMERCIAL

## 2022-10-21 DIAGNOSIS — C73 PAPILLARY THYROID CARCINOMA (H): ICD-10-CM

## 2022-10-21 LAB
T3 SERPL-MCNC: 92 NG/DL (ref 85–202)
T4 FREE SERPL-MCNC: 1.62 NG/DL (ref 0.76–1.46)
TSH SERPL DL<=0.005 MIU/L-ACNC: 2.68 MU/L (ref 0.4–4)

## 2022-10-21 PROCEDURE — 84443 ASSAY THYROID STIM HORMONE: CPT

## 2022-10-21 PROCEDURE — 36415 COLL VENOUS BLD VENIPUNCTURE: CPT

## 2022-10-21 PROCEDURE — 84480 ASSAY TRIIODOTHYRONINE (T3): CPT

## 2022-10-21 PROCEDURE — 86800 THYROGLOBULIN ANTIBODY: CPT | Mod: 90

## 2022-10-21 PROCEDURE — 84439 ASSAY OF FREE THYROXINE: CPT

## 2022-10-21 PROCEDURE — 99000 SPECIMEN HANDLING OFFICE-LAB: CPT

## 2022-10-21 PROCEDURE — 84432 ASSAY OF THYROGLOBULIN: CPT | Mod: 90

## 2022-10-21 NOTE — LETTER
"Patient:  Yumiko Marquez  :   1990  MRN:     [id number]        Ms.Valerie ENRIQUE Marquez  [address]  [facility] [address]        2022    Dear Yumiko    Here are your lab results.  Your thyroglobulin antibody was undetectable.  The thyroglobulin, measure of thyroid tissue in your body, remains low at 0.46.  Of course, with radioactive iodine, this would be undetectable.  That being said, it is within acceptable ranges for people who have had a history of thyroidectomy without radioactive iodine.  We can continue to use this as a tumor marker moving forward.  In addition, increasing your levothyroxine will also provide \"suppressive treatment\" and we will follow your thyroglobulin.  Please recheck your labs in 2 months.  We will also recheck your thyroglobulin at that time.    If you have any questions, please feel free to contact my nurse at [phone] select option #3 for triage nurse  or  option #1 for scheduling related questions.    Regards    Marzena Lagunas MD     For your reference:  2022: TSH 5.6, thyroglobulin antibody undetectable, thyroglobulin 0.46    Resulted Orders   TSH   Result Value Ref Range    TSH 2.68 0.40 - 4.00 mU/L   T4 free   Result Value Ref Range    Free T4 1.62 (H) 0.76 - 1.46 ng/dL   Thyroglobulin and Antibody (Sendout to [facility])   Result Value Ref Range    See Scanned Result       THYROGLOBULIN AND ANTIBODY (SENDOUT TO [facility])-Scanned   T3 total   Result Value Ref Range    T3 Total 92 85 - 202 ng/dL       No name on file      "

## 2022-10-24 ENCOUNTER — VIRTUAL VISIT (OUTPATIENT)
Dept: FAMILY MEDICINE | Facility: CLINIC | Age: 32
End: 2022-10-24
Payer: COMMERCIAL

## 2022-10-24 DIAGNOSIS — F33.8 SEASONAL AFFECTIVE DISORDER (H): Primary | ICD-10-CM

## 2022-10-24 PROCEDURE — 99213 OFFICE O/P EST LOW 20 MIN: CPT | Mod: 95 | Performed by: FAMILY MEDICINE

## 2022-10-24 RX ORDER — CITALOPRAM HYDROBROMIDE 10 MG/1
10 TABLET ORAL DAILY
Qty: 30 TABLET | Refills: 1 | Status: SHIPPED | OUTPATIENT
Start: 2022-10-24 | End: 2024-01-04

## 2022-10-24 ASSESSMENT — PATIENT HEALTH QUESTIONNAIRE - PHQ9
10. IF YOU CHECKED OFF ANY PROBLEMS, HOW DIFFICULT HAVE THESE PROBLEMS MADE IT FOR YOU TO DO YOUR WORK, TAKE CARE OF THINGS AT HOME, OR GET ALONG WITH OTHER PEOPLE: SOMEWHAT DIFFICULT
SUM OF ALL RESPONSES TO PHQ QUESTIONS 1-9: 16
SUM OF ALL RESPONSES TO PHQ QUESTIONS 1-9: 16

## 2022-10-24 ASSESSMENT — ANXIETY QUESTIONNAIRES
GAD7 TOTAL SCORE: 11
7. FEELING AFRAID AS IF SOMETHING AWFUL MIGHT HAPPEN: MORE THAN HALF THE DAYS
GAD7 TOTAL SCORE: 11
4. TROUBLE RELAXING: SEVERAL DAYS
8. IF YOU CHECKED OFF ANY PROBLEMS, HOW DIFFICULT HAVE THESE MADE IT FOR YOU TO DO YOUR WORK, TAKE CARE OF THINGS AT HOME, OR GET ALONG WITH OTHER PEOPLE?: SOMEWHAT DIFFICULT
7. FEELING AFRAID AS IF SOMETHING AWFUL MIGHT HAPPEN: MORE THAN HALF THE DAYS
GAD7 TOTAL SCORE: 11
3. WORRYING TOO MUCH ABOUT DIFFERENT THINGS: SEVERAL DAYS
1. FEELING NERVOUS, ANXIOUS, OR ON EDGE: NEARLY EVERY DAY
IF YOU CHECKED OFF ANY PROBLEMS ON THIS QUESTIONNAIRE, HOW DIFFICULT HAVE THESE PROBLEMS MADE IT FOR YOU TO DO YOUR WORK, TAKE CARE OF THINGS AT HOME, OR GET ALONG WITH OTHER PEOPLE: SOMEWHAT DIFFICULT
2. NOT BEING ABLE TO STOP OR CONTROL WORRYING: MORE THAN HALF THE DAYS
6. BECOMING EASILY ANNOYED OR IRRITABLE: MORE THAN HALF THE DAYS
5. BEING SO RESTLESS THAT IT IS HARD TO SIT STILL: NOT AT ALL

## 2022-10-24 NOTE — PATIENT INSTRUCTIONS
DEPRESSION: PATIENT SELF CARE PLAN    Self-Care for Depression  Here s the deal. Your body and mind are really not as separate as most people think.  What you do and think affects how you feel and how you feel influences what you do and think. This means if you do things that people who feel good do, it will help you feel better.  Sometimes this is all it takes.  There is also a place for medication and therapy depending on how severe your depression is, so be sure to consult with your medical provider and/ or Behavioral Health Consultant if your symptoms are worsening or not improving.     In order to better manage my stress, I will:   Exercise  Get some form of exercise, every day. This will help reduce pain and release endorphins, the  feel good  chemicals in your brain. This is almost as good as taking antidepressants!  This is not the same as joining a gym and then never going! (they count on that by the way ) It can be as simple as just going for a walk or doing some gardening, anything that will get you moving.      I plan to to exercise in the form of hiking, biking, walking, swimming, running, jogging, tennis, golf, rollerblading, skiing, and yoga, with a frequency of 4x per week.  I realize that some days I will miss, but I will commit to getting back on schedule for my own good.  Hygiene   Maintain good hygiene (Get out of bed in the morning, Make your bed, Brush your teeth, Take a shower, and Get dressed like you were going to work, even if you are unemployed).  If your clothes don't fit try to get ones that do.    Diet  I will strive to eat foods that are good for me, drink plenty of water, and avoid excessive sugar, caffeine, alcohol, and other mood-altering substances.  Some foods that are helpful in depression are: complex carbohydrates, B vitamins, flaxseed, fish or fish oil, fresh fruits and vegetables.    Psychotherapy  I agree to participate in Individual Therapy      Medication  I agree to take  my medication as prescribed.  Missing doses can result in serious side effects.  I understand that drinking alcohol may cause potential side effects.  I will not stop my medication abruptly without first discussing it with my provider.      Light Therapy  This may be an option for some individuals with depression.  Talk with your provider about obtaining a presciption for full-spectrum lighting.    Staying Connected With Others  I will stay in touch with my friends, family members, and my primary care provider/team.            Use your imagination  Be creative.  We all have a creative side; it doesn t matter if it s oil painting, sand castles, or mud pies! This will also kick up the endorphins.  Witness Beauty  (AKA stop and smell the roses) Take a look outside, even in mid-winter. Notice colors, textures. Watch the squirrels and birds.     Service to others  Be of service to others.  There is always someone else in need.  By helping others we can  get out of ourselves  and remember the really important things.  This also provides opportunities for practicing all the other parts of the program.  Humor  Laugh and be silly!  Adjust your TV habits for less news and crime-drama and more comedy.  Control your stress  Try breathing deep, massage therapy, biofeedback, and meditation. Find time to relax each day.

## 2022-10-24 NOTE — PROGRESS NOTES
Yumiko is a 32 year old who is being evaluated via a billable video visit.      How would you like to obtain your AVS? MyChart  If the video visit is dropped, the invitation should be resent by: Send to e-mail at: [email or web address]  Will anyone else be joining your video visit? No         Assessment & Plan     Seasonal affective disorder (H)  See avs  Recheck in 4-6 weeks  - citalopram (CELEXA) 10 MG tablet; Take 1 tablet (10 mg) by mouth daily    Prescription drug management  24 minutes spent on the date of the encounter doing chart review, history and exam, documentation and further activities per the note        Depression Screening Follow Up    PHQ 10/24/2022   PHQ-9 Total Score 16   Q9: Thoughts of better off dead/self-harm past 2 weeks Several days   F/U: Thoughts of suicide or self-harm Yes   F/U: Self harm-plan No   F/U: Self-harm action No   F/U: Safety concerns No     Last PHQ-9 10/24/2022   1.  Little interest or pleasure in doing things 3   2.  Feeling down, depressed, or hopeless 3   3.  Trouble falling or staying asleep, or sleeping too much 2   4.  Feeling tired or having little energy 3   5.  Poor appetite or overeating 0   6.  Feeling bad about yourself 2   7.  Trouble concentrating 2   8.  Moving slowly or restless 0   Q9: Thoughts of better off dead/self-harm past 2 weeks 1   PHQ-9 Total Score 16   Difficulty at work, home, or with people -   In the past two weeks have you had thoughts of suicide or self harm? Yes   Do you have concerns about your personal safety or the safety of others? No   In the past 2 weeks have you thought about a plan or had intention to harm yourself? No   In the past 2 weeks have you acted on these thoughts in any way? No               Follow Up      Follow Up Actions Taken  Crisis resource information provided in the After Visit Summary    Discussed the following ways the patient can remain in a safe environment:  be around others  See Patient  Instructions    Return in about 4 weeks (around 11/21/2022) for follow up, depression/Anxiety recheck.    Aleta Holder MD  [facility] is a 32 year old, presenting for the following health issues:  No chief complaint on file.      History of Present Illness       Mental Health Follow-up:  Patient presents to follow-up on Depression & Anxiety.Patient's depression since last visit has been:  Worse  The patient is having other symptoms associated with depression.  Patient's anxiety since last visit has been:  Worse  The patient is having other symptoms associated with anxiety.  Any significant life events: relationship concerns, job concerns, financial concerns, grief or loss and health concerns  Patient is feeling anxious or having panic attacks.  Patient has no concerns about alcohol or drug use.    She eats 2-3 servings of fruits and vegetables daily.She consumes 0 sweetened beverage(s) daily.She exercises with enough effort to increase her heart rate 30 to 60 minutes per day.  She exercises with enough effort to increase her heart rate 3 or less days per week.   She is taking medications regularly.    Today's PHQ-9         PHQ-9 Total Score: 16    PHQ-9 Q9 Thoughts of better off dead/self-harm past 2 weeks :   Several days  Thoughts of suicide or self harm: (P) Yes  Self-harm Plan:   (P) No  Self-harm Action:     (P) No  Safety concerns for self or others: (P) No    How difficult have these problems made it for you to do your work, take care of things at home, or get along with other people: Somewhat difficult  Today's CHE-7 Score: 11         Abnormal Mood Symptoms  Onset/Duration: More than a month   Description: overwhelmed feelings coming out of the blue  Feels sadness tearful  Swings in mood  Denies codie hypoma annemarie  Feels lower energy  Could be seasonal she feels  Usually starts vit D this time    Recently had thyroid levels done  Has less ability to  cope  Had thyroid cancer in the past - recently had surgery for this  Lots of stress lately  Has not tried a medication for mood recently  Had depression at 20 this improved with moving to [place] - sun really helped  Was on lamictal/lamotrigine  Took this for 9 months      Felt depressed all the time    Started working with a therapist  Has tried lots of tools    Depression (if yes, do PHQ-9): YES  Anxiety (if yes, do CHE-7): YES  Accompanying Signs & Symptoms:  Still participating in activities that you used to enjoy: lack of mtivation  Fatigue: YES  Irritability: YES  Difficulty concentrating: YES  Changes in appetite: No  Problems with sleep: YES  Heart racing/beating fast: No  Abnormally elevated, expansive, or irritable mood: YES  Persistently increased activity or energy: No  Thoughts of hurting yourself or others: YES  History:  Recent stress or major life event: YES  Prior depression or anxiety: Yes  Family history of depression or anxiety:   Alcohol/drug use: No  Difficulty sleeping: No  Precipitating or alleviating factors: None  Therapies tried and outcome: none and individual therapy  PHQ 8/11/2022 10/24/2022   PHQ-9 Total Score 6 16   Q9: Thoughts of better off dead/self-harm past 2 weeks Several days Several days   F/U: Thoughts of suicide or self-harm - Yes   F/U: Self harm-plan - No   F/U: Self-harm action - No   F/U: Safety concerns - No     CHE-7 SCORE 8/11/2022 10/24/2022   Total Score - 11 (moderate anxiety)   Total Score 7 11         Review of Systems   Constitutional, HEENT, cardiovascular, pulmonary, gi and gu systems are negative, except as otherwise noted.      Objective           Vitals:  No vitals were obtained today due to virtual visit.    Physical Exam   GENERAL: Healthy, alert and no distress  EYES: Eyes grossly normal to inspection.  No discharge or erythema, or obvious scleral/conjunctival abnormalities.  RESP: No audible wheeze, cough, or visible cyanosis.  No visible retractions or  increased work of breathing.    SKIN: Visible skin clear. No significant rash, abnormal pigmentation or lesions.  NEURO: Cranial nerves grossly intact.  Mentation and speech appropriate for age.  PSYCH: Mentation appears normal, affect normal/bright, judgement and insight intact, normal speech and appearance well-groomed.    Lab on 10/21/2022   Component Date Value Ref Range Status     TSH 10/21/2022 2.68  0.40 - 4.00 mU/L Final     Free T4 10/21/2022 1.62 (H)  0.76 - 1.46 ng/dL Final     T3 Total 10/21/2022 92  85 - 202 ng/dL Final               Video-Visit Details    Video Start Time: 901    Type of service:  Video Visit    Video End Time:9:25 AM    Originating Location (pt. Location): Home    Distant Location (provider location):  On-site    Platform used for Video Visit: Yifan

## 2022-10-27 ENCOUNTER — TELEPHONE (OUTPATIENT)
Dept: ENDOCRINOLOGY | Facility: CLINIC | Age: 32
End: 2022-10-27

## 2022-10-27 DIAGNOSIS — C73 PAPILLARY THYROID CARCINOMA (H): ICD-10-CM

## 2022-10-27 RX ORDER — LEVOTHYROXINE SODIUM 175 UG/1
TABLET ORAL
Qty: 110 TABLET | Refills: 3 | Status: SHIPPED | OUTPATIENT
Start: 2022-10-27 | End: 2023-05-30

## 2022-10-28 NOTE — RESULT ENCOUNTER NOTE
Dear Yumiko    I am still waiting on your thyroglobulin. Your TSH is normal. However,I think we can give you a little extra thyroid hormone to suppress the TSH which will reduce the risk for recurrence. I would have you take 1 tablet daily, 6 days per week and 1.5 tablet daily on the 7th day.     If you have any questions, please feel free to contact my nurse at [phone] select option #3 for triage nurse  or  option #1 for scheduling related questions.    Regards    Marzena Lagunas MD

## 2022-10-28 NOTE — TELEPHONE ENCOUNTER
Waiting on thyroglobulin - will have pt increase levothyroxine a bit    -  Dear Yumiko    I am still waiting on your thyroglobulin. Your TSH is normal. However,I think we can give you a little extra thyroid hormone to suppress the TSH which will reduce the risk for recurrence. I would have you take 1 tablet daily, 6 days per week and 1.5 tablet daily on the 7th day.     If you have any questions, please feel free to contact my nurse at [phone] select option #3 for triage nurse  or  option #1 for scheduling related questions.    Regards    Marzena Lagunas MD     Lab on 10/21/2022   Component Date Value Ref Range Status     TSH 10/21/2022 2.68  0.40 - 4.00 mU/L Final     Free T4 10/21/2022 1.62 (H)  0.76 - 1.46 ng/dL Final     T3 Total 10/21/2022 92  85 - 202 ng/dL Final

## 2022-10-31 LAB — SCANNED LAB RESULT: NORMAL

## 2022-11-01 ENCOUNTER — TELEPHONE (OUTPATIENT)
Dept: ENDOCRINOLOGY | Facility: CLINIC | Age: 32
End: 2022-11-01

## 2022-11-01 DIAGNOSIS — C73 PAPILLARY THYROID CARCINOMA (H): Primary | ICD-10-CM

## 2022-11-01 NOTE — RESULT ENCOUNTER NOTE
"Dear Yumiko    Here are your lab results.  Your thyroglobulin antibody was undetectable.  The thyroglobulin, measure of thyroid tissue in your body, remains low at 0.46.  Of course, with radioactive iodine, this would be undetectable.  That being said, it is within acceptable ranges for people who have had a history of thyroidectomy without radioactive iodine.  We can continue to use this as a tumor marker moving forward.  In addition, increasing your levothyroxine will also provide \"suppressive treatment\" and we will follow your thyroglobulin.  Please recheck your labs in 2 months.  We will also recheck your thyroglobulin at that time.    If you have any questions, please feel free to contact my nurse at [phone] select option #3 for triage nurse  or  option #1 for scheduling related questions.    Regards    Marzena Lagunas MD     For your reference:  October 2022: TSH 5.6, thyroglobulin antibody undetectable, thyroglobulin 0.46"

## 2022-11-01 NOTE — TELEPHONE ENCOUNTER
"Called pt results reviewed - pt to do labs    -  Dear Yumiko    Here are your lab results.  Your thyroglobulin antibody was undetectable.  The thyroglobulin, measure of thyroid tissue in your body, remains low at 0.46.  Of course, with radioactive iodine, this would be undetectable.  That being said, it is within acceptable ranges for people who have had a history of thyroidectomy without radioactive iodine.  We can continue to use this as a tumor marker moving forward.  In addition, increasing your levothyroxine will also provide \"suppressive treatment\" and we will follow your thyroglobulin.  Please recheck your labs in 2 months.  We will also recheck your thyroglobulin at that time.    If you have any questions, please feel free to contact my nurse at [phone] select option #3 for triage nurse  or  option #1 for scheduling related questions.    Regards    Marzena Lagunas MD     For your reference:  October 2022: TSH 5.6, thyroglobulin antibody undetectable, thyroglobulin 0.46      "

## 2023-02-10 ENCOUNTER — PATIENT OUTREACH (OUTPATIENT)
Dept: CARE COORDINATION | Facility: CLINIC | Age: 33
End: 2023-02-10
Payer: COMMERCIAL

## 2023-02-10 NOTE — PROGRESS NOTES
Social Work Intervention  [facility]    Data/Intervention:    Patient Name:  Yumiko Marquez  /Age:  1990 (32 year old)    Visit Type: telephone  Referral Source: Dr Marzena Lagunas  Reason for Referral:  Medical debt program     Collaborated With:    -Yumiko    Psychosocial Information/Concerns:  Pt has medical debt from her surgery last year and is on a payment plan. She reports she makes a decent salary but has multiple expenses. She is reluctant to add to the debt unless absolutely necessary and she reports she is feeling well.     Intervention/Education/Resources Provided:  Reviewed the income guideline for [place] and she does not qualify. She hopes that next year she will be in a better place and can afford a lower deductible health plan.   She is considering labs recommended by Dr Lagunas and to postpone visit if there are ok and she will communicate with Dr Lagunas about that.  She would like to continue counseling but insur is an issue there too. Provided info about trying the free walk in counseling center.     Assessment/Plan:  Pt juggling expenses and need for medical care. She is over the income allowed for the program here.  No further follow up planned.     Provided patient/family with contact information and availability.    AARON Smith, [facility]    [facility]  [phone]

## 2023-04-25 ENCOUNTER — PATIENT OUTREACH (OUTPATIENT)
Dept: FAMILY MEDICINE | Facility: CLINIC | Age: 33
End: 2023-04-25
Payer: COMMERCIAL

## 2023-04-25 DIAGNOSIS — R87.810 CERVICAL HIGH RISK HPV (HUMAN PAPILLOMAVIRUS) TEST POSITIVE: ICD-10-CM

## 2023-05-10 NOTE — TELEPHONE ENCOUNTER
RN called patient regarding lab only appointment on Friday.  Scheduled for strep test from a year ago.  Not needed.  Cancelled lab appointment.  Did schedule for pap follow-up.  Margarita MITCHELL RN

## 2023-05-17 ENCOUNTER — OFFICE VISIT (OUTPATIENT)
Dept: FAMILY MEDICINE | Facility: CLINIC | Age: 33
End: 2023-05-17
Payer: COMMERCIAL

## 2023-05-17 VITALS
DIASTOLIC BLOOD PRESSURE: 84 MMHG | OXYGEN SATURATION: 100 % | WEIGHT: 156.2 LBS | HEIGHT: 67 IN | TEMPERATURE: 97.9 F | HEART RATE: 71 BPM | RESPIRATION RATE: 16 BRPM | BODY MASS INDEX: 24.52 KG/M2 | SYSTOLIC BLOOD PRESSURE: 127 MMHG

## 2023-05-17 DIAGNOSIS — F33.8 SEASONAL AFFECTIVE DISORDER (H): ICD-10-CM

## 2023-05-17 DIAGNOSIS — Z00.00 WELL ADULT EXAM: Primary | ICD-10-CM

## 2023-05-17 DIAGNOSIS — Z12.4 CERVICAL CANCER SCREENING: ICD-10-CM

## 2023-05-17 DIAGNOSIS — C73 PAPILLARY THYROID CARCINOMA (H): ICD-10-CM

## 2023-05-17 PROCEDURE — 36415 COLL VENOUS BLD VENIPUNCTURE: CPT | Performed by: FAMILY MEDICINE

## 2023-05-17 PROCEDURE — 99000 SPECIMEN HANDLING OFFICE-LAB: CPT | Performed by: FAMILY MEDICINE

## 2023-05-17 PROCEDURE — 84480 ASSAY TRIIODOTHYRONINE (T3): CPT | Performed by: FAMILY MEDICINE

## 2023-05-17 PROCEDURE — 99395 PREV VISIT EST AGE 18-39: CPT | Performed by: FAMILY MEDICINE

## 2023-05-17 PROCEDURE — 84439 ASSAY OF FREE THYROXINE: CPT | Performed by: FAMILY MEDICINE

## 2023-05-17 PROCEDURE — G0145 SCR C/V CYTO,THINLAYER,RESCR: HCPCS | Performed by: FAMILY MEDICINE

## 2023-05-17 PROCEDURE — 84443 ASSAY THYROID STIM HORMONE: CPT | Performed by: FAMILY MEDICINE

## 2023-05-17 PROCEDURE — 84432 ASSAY OF THYROGLOBULIN: CPT | Mod: 90 | Performed by: FAMILY MEDICINE

## 2023-05-17 PROCEDURE — 86800 THYROGLOBULIN ANTIBODY: CPT | Mod: 90 | Performed by: FAMILY MEDICINE

## 2023-05-17 PROCEDURE — 87624 HPV HI-RISK TYP POOLED RSLT: CPT | Performed by: FAMILY MEDICINE

## 2023-05-17 ASSESSMENT — PAIN SCALES - GENERAL: PAINLEVEL: MILD PAIN (2)

## 2023-05-17 NOTE — LETTER
May 30, 2023      Yumiko Marquez  [address]  [place] [address]        Dear Yumiko    Here are your labs.  Your TSH is suppressed.  As a result, your thyroglobulin levels are lower than before (0.28, previously 0.53).  I will continue with your current thyroid hormone dosage at this time-refill sent to your pharmacy.  I will schedule again for a neck ultrasound and chest x-ray before your return appointment in August 2023.    Dr. Lagunas    For your reference  -  May 17, 2023 thyroglobulin antibody is undetectable, thyroglobulin 0.28, TSH 0.10, free t4 at 1.89    October 2022: TSH 2.68, thyroglobulin antibody undetectable, thyroglobulin 0.53    Pt completion thyroidectomy on 4/12/22 February 2022 TSH each normal, thyroglobulin around 19.6 (still has remaining left thyroid)    Resulted Orders   Thyroglobulin and Antibody (Sendout to [facility])   Result Value Ref Range    See Scanned Result       THYROGLOBULIN AND ANTIBODY (SENDOUT TO [facility])-Scanned   T3 total   Result Value Ref Range    T3 Total 110 85 - 202 ng/dL   T4 free   Result Value Ref Range    Free T4 1.89 (H) 0.90 - 1.70 ng/dL   TSH   Result Value Ref Range    TSH 0.10 (L) 0.30 - 4.20 uIU/mL

## 2023-05-17 NOTE — PROGRESS NOTES
"  Assessment & Plan     Well adult exam      Cervical cancer screening    - Pap Screen with HPV - recommended age 30 - 65 years    Seasonal affective disorder (H)      Papillary thyroid carcinoma (H)    - Thyroglobulin and Antibody (Sendout to [facility])  - T3 total  - T4 free  - TSH      No LOS data to display   Time spent by me doing chart review, history and exam, documentation and further activities per the note       See Patient Instructions    Aleta Holder MD  [facility] 32 year [facility], presenting for the following health issues:  Pap Follow Up and TSH Follow Up        5/17/2023     3:46 PM   Additional Questions   Roomed by Asia TORRES     History of Present Illness       Reason for visit:  [employer]    She eats 2-3 servings of fruits and vegetables daily.She consumes 0 sweetened beverage(s) daily.She exercises with enough effort to increase her heart rate 30 to 60 minutes per day.  She exercises with enough effort to increase her heart rate 4 days per week.   She is taking medications regularly.               Review of Systems         Objective    /84   Pulse 71   Temp 97.9  F (36.6  C) (Temporal)   Resp 16   Ht 1.702 m (5' 7\")   Wt 70.9 kg (156 lb 3.2 oz)   LMP 04/21/2023 (Exact Date)   SpO2 100%   BMI 24.46 kg/m    Body mass index is 24.46 kg/m .  Physical Exam   GENERAL: healthy, alert and no distress  EYES: Eyes grossly normal to inspection, PERRL and conjunctivae and sclerae normal  HENT: ear canals and TM's normal, nose and mouth without ulcers or lesions  NECK: no adenopathy, no asymmetry, masses, or scars and thyroid normal to palpation  RESP: lungs clear to auscultation - no rales, rhonchi or wheezes  BREAST: normal without masses, tenderness or nipple discharge and no palpable axillary masses or adenopathy  CV: regular rate and rhythm, normal S1 S2, no S3 or S4, no murmur, click or rub, no peripheral edema and peripheral pulses " strong  ABDOMEN: soft, nontender, no hepatosplenomegaly, no masses and bowel sounds normal  MS: no gross musculoskeletal defects noted, no edema  SKIN: no suspicious lesions or rashes  NEURO: Normal strength and tone, mentation intact and speech normal  PSYCH: mentation appears normal, affect normal/bright

## 2023-05-18 LAB
T3 SERPL-MCNC: 110 NG/DL (ref 85–202)
T4 FREE SERPL-MCNC: 1.89 NG/DL (ref 0.9–1.7)
TSH SERPL DL<=0.005 MIU/L-ACNC: 0.1 UIU/ML (ref 0.3–4.2)

## 2023-05-22 LAB
BKR LAB AP GYN ADEQUACY: NORMAL
BKR LAB AP GYN INTERPRETATION: NORMAL
BKR LAB AP HPV REFLEX: NORMAL
BKR LAB AP LMP: NORMAL
BKR LAB AP PREVIOUS ABNL DX: NORMAL
BKR LAB AP PREVIOUS ABNORMAL: NORMAL
PATH REPORT.COMMENTS IMP SPEC: NORMAL
PATH REPORT.COMMENTS IMP SPEC: NORMAL
PATH REPORT.RELEVANT HX SPEC: NORMAL

## 2023-05-24 ENCOUNTER — PATIENT OUTREACH (OUTPATIENT)
Dept: FAMILY MEDICINE | Facility: CLINIC | Age: 33
End: 2023-05-24
Payer: COMMERCIAL

## 2023-05-24 DIAGNOSIS — R87.810 CERVICAL HIGH RISK HPV (HUMAN PAPILLOMAVIRUS) TEST POSITIVE: Primary | ICD-10-CM

## 2023-05-24 LAB — SCANNED LAB RESULT: NORMAL

## 2023-05-24 NOTE — LETTER
July 13, 2023      Yumiko Marquez  [address]  [place] [address]        Dear ,    At [facility], your health and wellness are our primary concern. That is why we are following up on your most recent positive high-risk Human Papillomavirus (HPV) test.    Please call [phone] to schedule an appointment for your recommended follow-up Colposcopy (this cannot be scheduled through [facility]) at your earliest convenience.      If you have completed the appointment outside of the [facility] system, please have the records forwarded to our office. We will update your chart for your provider to review before your next annual wellness visit.     Thank you for choosing [facility]!      Sincerely,    Your [facility] Care Team

## 2023-05-30 ENCOUNTER — TELEPHONE (OUTPATIENT)
Dept: ENDOCRINOLOGY | Facility: CLINIC | Age: 33
End: 2023-05-30
Payer: COMMERCIAL

## 2023-05-30 DIAGNOSIS — C73 PAPILLARY THYROID CARCINOMA (H): ICD-10-CM

## 2023-05-30 RX ORDER — LEVOTHYROXINE SODIUM 175 UG/1
TABLET ORAL
Qty: 110 TABLET | Refills: 3 | Status: SHIPPED | OUTPATIENT
Start: 2023-05-30 | End: 2023-08-04

## 2023-05-30 NOTE — TELEPHONE ENCOUNTER
Called pt - left message.    -  Dear Yumiko    Here are your labs.  Your TSH is suppressed.  As a result, your thyroglobulin levels are lower than before (0.28, previously 0.53).  I will continue with your current thyroid hormone dosage at this time-refill sent to your pharmacy.  I will schedule again for a neck ultrasound and chest x-ray before your return appointment in August 2023.    Dr. Lagunas    For your reference  -  May 17, 2023 thyroglobulin antibody is undetectable, thyroglobulin 0.28, TSH 0.10, free t4 at 1.89    October 2022: TSH 2.68, thyroglobulin antibody undetectable, thyroglobulin 0.53    Pt completion thyroidectomy on 4/12/22 February 2022 TSH each normal, thyroglobulin around 19.6 (still has remaining left thyroid)

## 2023-08-04 ENCOUNTER — VIRTUAL VISIT (OUTPATIENT)
Dept: ENDOCRINOLOGY | Facility: CLINIC | Age: 33
End: 2023-08-04
Payer: COMMERCIAL

## 2023-08-04 ENCOUNTER — TELEPHONE (OUTPATIENT)
Dept: ENDOCRINOLOGY | Facility: CLINIC | Age: 33
End: 2023-08-04

## 2023-08-04 DIAGNOSIS — C73 PAPILLARY THYROID CARCINOMA (H): ICD-10-CM

## 2023-08-04 PROCEDURE — 99214 OFFICE O/P EST MOD 30 MIN: CPT | Mod: VID | Performed by: INTERNAL MEDICINE

## 2023-08-04 RX ORDER — LEVOTHYROXINE SODIUM 175 UG/1
TABLET ORAL
Qty: 140 TABLET | Refills: 3 | Status: SHIPPED | OUTPATIENT
Start: 2023-08-04 | End: 2023-08-04

## 2023-08-04 RX ORDER — LEVOTHYROXINE SODIUM 175 UG/1
TABLET ORAL
Qty: 140 TABLET | Refills: 3 | Status: SHIPPED | OUTPATIENT
Start: 2023-08-04 | End: 2023-09-08

## 2023-08-04 NOTE — LETTER
8/4/2023       RE: Yumiko Marquez  [address]  [facility] [address]     Dear Colleague,    Thank you for referring your patient, Yumiko Marquez, to the [facility] [place] at [facility]. Please see a copy of my visit note below.    Video-Visit Details    Type of service:  Video Visit    Virtual visit conducted by Benny.      Originating Location (pt. Location): HOME    Distant Location (provider location):  Off site    Mode of Communication:  Video Conference via Monogram    Physician has received verbal consent for a Video Visit from the patient? YES      Marzena Lagunas MD    8/4/23    Yumiko Marquez  is being evaluated via a billable video visit.      Start time 1230, stop time 1250, chart review, documentation time, coordination of care, 10 minutes, total time spent day of the encounter 30 minutes.    HPI  #1  Multifocal Papillary thyroid cancer (T3, N0, Mx) , status post right hemithyroidectomy and isthumusectomy in November 2019-4.3 cm in size with 10% possibly poorly differentiated - now s/p completion thyroidectomy on 4/12/22  In 2019, the pt had  An ultrasound thyroid demonstrated multiple nodules in the right thyroid lobe, the largest was 3.7cm. 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  She underwent right thyroidectomy and isthmusectomy by Dr. Camargo on 11/26/2019.  Surgical pathology showed 4.3 cm papillary thyroid cancer, unifocal with clear margin.  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 The papillary thyroid cancer was well-circumscribed and encapsulated with predominantly follicular growth pattern.  However the multifocal growth of tall cells and a oncocytic changes and mitosis is focally increased up to 5 per 10 high-power fields.  The findings are consistent with focal poorly differentiated thyroid carcinoma of 10% however there was no capsular or  angiolymphatic invasion.  Therefore this tumor is most likely not behave in an aggressive fashion.  No lymph node was removed.  Tumor staging [id number].   She was seen by endocrinology in 2020-completion thyroidectomy was recommended however the patient had deferred due to concerns about need for lifelong levothyroxine replacement.  February 2022 neck ultrasound shows possibly enlarging nodule on the left side (around 5 mm). February 2022 TSH each normal, thyroglobulin around 19.6 (still has remaining left thyroid)    Pt completion thyroidectomy on 4/12/22. Pathology showed no malignancy in remaining thyroid.  She was discharged on levothyroxine at 112 mcg daily.      : In April 2022, pt felt poorly that with herTSH at 6.97, low T3 at 54, low free T3 at 1.5, and normal free T4 at 0.94.  Her potassium was also low at 3.2 and her white count was slightly higher at 11.4. Pt increased to levothyroxine at 125 mcg daily. Recheck TSH on 7/22 at 11.26 and I asked pt to increase to 125 mcg daily, 5 days per week and 250 mcg daily, 2 days per week.     Interval history: October 2022, TSH at 2.68.  Therefore her levothyroxine was increased.  Patient is on levothyroxine 175 mcg daily 6 days a week, and 262.5 mcg (1.5 table) daily for 1 day per week.  However, she often forgets to take the half tablet on Sundays.  May 2023 TSH at 0.10, thyroglobulin of 0.28.  She reports that she is tolerating her current dose of levothyroxine and denies any associated symptoms of overactive thyroid.,  Due to insurance issues, she would prefer to delay her imaging and lab testing until 2024 when she changes her insurance.    May 17, 2023 thyroglobulin antibody is undetectable, thyroglobulin 0.28, TSH 0.10, free t4 at 1.89     October 2022: TSH 2.68, thyroglobulin antibody undetectable, thyroglobulin 0.53     Pt completion thyroidectomy on 4/12/22 February 2022 TSH each normal, thyroglobulin around 19.6 (still has remaining left  thyroid)      Past Medical History  Past Medical History:   Diagnosis Date    Cervical high risk HPV (human papillomavirus) test positive 02/25/2021 02/25/21    Complication of anesthesia     History of ITP 1995    PONV (postoperative nausea and vomiting)        Allergies  Allergies   Allergen Reactions    Seasonal Allergies      Medications  Current Outpatient Medications   Medication Sig Dispense Refill    citalopram (CELEXA) 10 MG tablet Take 1 tablet (10 mg) by mouth daily (Patient not taking: Reported on 5/17/2023) 30 tablet 1    etonogestrel-ethinyl estradiol (NUVARING) 0.12-0.015 MG/24HR vaginal ring Place 1 each vaginally every 28 days (Patient not taking: Reported on 5/17/2023) 3 each 3    levothyroxine (SYNTHROID/LEVOTHROID) 175 MCG tablet 1 tablet daily, 6 days per week and 1.5 tablet daily 110 tablet 3    LORazepam (ATIVAN) 0.5 MG tablet Take 1 tablet (0.5 mg) by mouth daily as needed for anxiety (panic attacks) 15 tablet 0     Family History  family history includes Heart Disease in her maternal grandfather; Melanoma (age of onset: 60) in her maternal grandmother; Thyroid Disease in her paternal aunt; Thyroid Disease (age of onset: 45) in her maternal grandfather; Valvular heart disease in her maternal grandfather.  Social History  Social History     Socioeconomic History    Marital status: Single     Spouse name: Not on file    Number of children: Not on file    Years of education: Not on file    Highest education level: Not on file   Occupational History    Not on file   Tobacco Use    Smoking status: Never    Smokeless tobacco: Never   Vaping Use    Vaping Use: Never used   Substance and Sexual Activity    Alcohol use: Yes     Comment: very litter     Drug use: Never    Sexual activity: Not Currently     Partners: Male     Birth control/protection: Condom   Other Topics Concern    Not on file   Social History Narrative     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 with 1  "roomate     Social Determinants of Health     Financial Resource Strain: Not on file   Food Insecurity: Not on file   Transportation Needs: Not on file   Physical Activity: Not on file   Stress: Not on file   Social Connections: Not on file   Intimate Partner Violence: Not on file   Housing Stability: Not on file       ROS  Per HPI      Physical Exam    GENERAL: Healthy, alert and no distress\",\"EYES: Eyes grossly normal to inspection.  No discharge or erythema, or obvious scleral/conjunctival abnormalities.\",\"RESP: No audible wheeze, cough, or visible cyanosis.  No visible retractions or increased work of breathing.  \",\"SKIN: Visible skin clear. No significant rash, abnormal pigmentation or lesions.\",\"NEURO: Cranial nerves grossly intact.  Mentation and speech appropriate for age.\",\"PSYCH: Mentation appears normal, affect normal/bright, judgement and insight intact, normal speech and appearance well-groomed.    RESULTS   See Scanned Result 05/17/2023 THYROGLOBULIN AND ANTIBODY (SENDOUT TO [facility])-Scanned   Final    T3 Total 05/17/2023 110  85 - 202 ng/dL Final    Free T4 05/17/2023 1.89 (H)  0.90 - 1.70 ng/dL Final    TSH 05/17/2023 0.10 (L)  0.30 - 4.20 uIU/mL Final     ASSESSMENT:    #1  Multifocal Papillary thyroid cancer (T3, N0, Mx) , status post right hemithyroidectomy and isthumusectomy in November 2019-4.3 cm in size with 10% possibly poorly differentiated - now s/p completion thyroidectomy on 4/12/22  Discussed with patient that her remaining left thyroid did not show any evidence for malignancy.  Extensive discussion with patient about radioactive iodine ablation.  Although my preference is that the patient consider radioactive iodine ablation, she would prefer to observe at this time.  She did have a completion thyroidectomy.  She continues on suppressive levothyroxine therapy, although she often forgets to take the extra half tablet on Sunday.    To simplify her program, we will have her take levothyroxine " 175 mcg daily for 1 week, and then 175 mcg daily first 6 days a week and 350 mcg daily (2 tablets) for 1 day/week.  She will then alternate this program.  Because of her concerns about cost, prescription sent to [facility].  She will let me know if this works out for her.    In terms of further imaging and laboratory evaluation, she would prefer to change her insurance in November 2023 and then had this performed in January 2024.  Orders noted below.    Return visit in 1 year.    Orders Placed This Encounter   Procedures    X-Ray Chest PA and Lateral    US head neck soft tissue    TSH    T4 free    Thyroglobulin and Antibody (Sendout to [facility])                 Again, thank you for allowing me to participate in the care of your patient.      Sincerely,    Marzena Lagunas MD

## 2023-08-04 NOTE — NURSING NOTE
Is the patient currently in the state of [place]? YES    Visit mode:VIDEO    If the visit is dropped, the patient can be reconnected by: VIDEO VISIT: Text to cell phone: [phone]    Will anyone else be joining the visit? NO      How would you like to obtain your AVS? MyChart    Are changes needed to the allergy or medication list? NO    Reason for visit: RECHECK and Video Visit

## 2023-08-04 NOTE — PATIENT INSTRUCTIONS
[facility]    [email or web address]    [phone]    I have sent your thyroid hormone here    If you want, [email or web address] is another option - you can see if your other meds might work through here

## 2023-08-04 NOTE — TELEPHONE ENCOUNTER
----- Message from Marzena Lagunas MD sent at 8/4/2023 12:55 PM CDT -----  Please tiff RX outreach to cancel her levothyroxine prescription.  Patient wants this sent to [facility] which I have done.

## 2023-08-04 NOTE — TELEPHONE ENCOUNTER
[facility] outreach called to cancel Levothyroxine order from today. Patient never registered with them so the order wouldn't be processed. Inessa Conde RN on 8/4/2023 at 1:26 PM

## 2023-09-06 ENCOUNTER — TELEPHONE (OUTPATIENT)
Dept: ENDOCRINOLOGY | Facility: CLINIC | Age: 33
End: 2023-09-06
Payer: COMMERCIAL

## 2023-09-06 NOTE — TELEPHONE ENCOUNTER
IZA x2 and sent mychart x2 for patient to schedule:    1 yr follow-up with Dr. Lagunas (around Aug 2024). RTN Endocrine visit.    Per checkout order notes, patient will call to schedule labs, chest x-ray and ultrasound to be completed in Jan 2024.

## 2023-09-07 NOTE — TELEPHONE ENCOUNTER
Patient called.  Unable to schedule 1 year follow up as no templates are available.  Please reach out to patient and assist.  Thank you.

## 2023-09-08 ENCOUNTER — TELEPHONE (OUTPATIENT)
Dept: ENDOCRINOLOGY | Facility: CLINIC | Age: 33
End: 2023-09-08
Payer: COMMERCIAL

## 2023-09-08 DIAGNOSIS — C73 PAPILLARY THYROID CARCINOMA (H): ICD-10-CM

## 2023-09-08 RX ORDER — LEVOTHYROXINE SODIUM 175 UG/1
TABLET ORAL
Qty: 140 TABLET | Refills: 3 | Status: SHIPPED | OUTPATIENT
Start: 2023-09-08 | End: 2023-09-08

## 2023-09-08 RX ORDER — LEVOTHYROXINE SODIUM 175 UG/1
TABLET ORAL
Qty: 140 TABLET | Refills: 3 | Status: SHIPPED | OUTPATIENT
Start: 2023-09-08 | End: 2024-09-06

## 2023-09-14 ENCOUNTER — TELEPHONE (OUTPATIENT)
Dept: ENDOCRINOLOGY | Facility: CLINIC | Age: 33
End: 2023-09-14
Payer: COMMERCIAL

## 2023-09-14 NOTE — TELEPHONE ENCOUNTER
Patient call:     Appointment type: return endocrine   Provider: Bebo   Return date:Aug 2024   Speciality phone number: [phone]   Additional appointment(s) needed: testing in jan 2024; x-ray, lab, and US   Additional notes: LVM and sent myc gina Matthews on 9/14/2023 at 12:23 PM

## 2023-11-29 ENCOUNTER — MYC REFILL (OUTPATIENT)
Dept: FAMILY MEDICINE | Facility: CLINIC | Age: 33
End: 2023-11-29
Payer: COMMERCIAL

## 2023-11-29 DIAGNOSIS — F41.9 ANXIETY: ICD-10-CM

## 2023-11-30 RX ORDER — LORAZEPAM 0.5 MG/1
0.5 TABLET ORAL DAILY PRN
Qty: 15 TABLET | Refills: 0 | Status: SHIPPED | OUTPATIENT
Start: 2023-11-30

## 2023-12-01 NOTE — TELEPHONE ENCOUNTER
FYI to provider - Patient is lost to pap tracking follow-up. Attempts to contact pt have been made per reminder process and there has been no reply and/or no appt scheduled. Contact hx listed below.     02/25/21 NIL Pap, + HR HPV (not 16 or 18). Plan cotest in 1 year due 02/25/22.   03/4/21 Msg left on voice mail to return call or advised patient to review results and recommendations that were released through [facility].  03/09/21 Left message for patient to call back or informed pt to view [facility] message.  03/16/21 Letter sent. Pt didn't return the call or view [facility] message.   02/09/22 Reminder Mychart, Letter  3/28/22 Pre-op appt -- added to visit notes pap due in case it can be done then  04/29/22 Reminder call-lm  5/24/22 Lost to follow-up for pap tracking  8/11/22 NIL, +HR HPV, not 16/18. Plan updated plan to repeat co-test in 6-9 months  8/25/22 Left message and [facility] letter / notified and MyChart sent with updated recommendation from provider  4/25/23 Reminder MyChart  05/17/23 NIL Pap, +HR HPV (not 16 or 18) Plan [place] due bef 08/17/23 / notified  7/13/23 Reminder letter  8/11/23 [place] not done. Tracking updated for 6 mo colp/pap due 11/17/23.   10/30/23 Reminder MyChart / Last read by Yumiko Marquez at  2:24 PM on 11/29/2023.   12/1/23 Lost to follow-up for pap tracking

## 2024-01-04 ENCOUNTER — VIRTUAL VISIT (OUTPATIENT)
Dept: FAMILY MEDICINE | Facility: CLINIC | Age: 34
End: 2024-01-04
Payer: COMMERCIAL

## 2024-01-04 DIAGNOSIS — F33.8 SEASONAL AFFECTIVE DISORDER (H): ICD-10-CM

## 2024-01-04 DIAGNOSIS — C73 PAPILLARY THYROID CARCINOMA (H): ICD-10-CM

## 2024-01-04 DIAGNOSIS — R68.89 FLU-LIKE SYMPTOMS: Primary | ICD-10-CM

## 2024-01-04 DIAGNOSIS — I73.00 RAYNAUD'S DISEASE WITHOUT GANGRENE: ICD-10-CM

## 2024-01-04 PROCEDURE — 96127 BRIEF EMOTIONAL/BEHAV ASSMT: CPT | Mod: 95 | Performed by: FAMILY MEDICINE

## 2024-01-04 PROCEDURE — 99213 OFFICE O/P EST LOW 20 MIN: CPT | Mod: 95 | Performed by: FAMILY MEDICINE

## 2024-01-04 ASSESSMENT — ANXIETY QUESTIONNAIRES
6. BECOMING EASILY ANNOYED OR IRRITABLE: NOT AT ALL
5. BEING SO RESTLESS THAT IT IS HARD TO SIT STILL: NOT AT ALL
4. TROUBLE RELAXING: NOT AT ALL
GAD7 TOTAL SCORE: 0
2. NOT BEING ABLE TO STOP OR CONTROL WORRYING: NOT AT ALL
3. WORRYING TOO MUCH ABOUT DIFFERENT THINGS: NOT AT ALL
IF YOU CHECKED OFF ANY PROBLEMS ON THIS QUESTIONNAIRE, HOW DIFFICULT HAVE THESE PROBLEMS MADE IT FOR YOU TO DO YOUR WORK, TAKE CARE OF THINGS AT HOME, OR GET ALONG WITH OTHER PEOPLE: NOT DIFFICULT AT ALL
8. IF YOU CHECKED OFF ANY PROBLEMS, HOW DIFFICULT HAVE THESE MADE IT FOR YOU TO DO YOUR WORK, TAKE CARE OF THINGS AT HOME, OR GET ALONG WITH OTHER PEOPLE?: NOT DIFFICULT AT ALL
1. FEELING NERVOUS, ANXIOUS, OR ON EDGE: NOT AT ALL
7. FEELING AFRAID AS IF SOMETHING AWFUL MIGHT HAPPEN: NOT AT ALL
GAD7 TOTAL SCORE: 0
GAD7 TOTAL SCORE: 0
7. FEELING AFRAID AS IF SOMETHING AWFUL MIGHT HAPPEN: NOT AT ALL

## 2024-01-04 ASSESSMENT — PATIENT HEALTH QUESTIONNAIRE - PHQ9
SUM OF ALL RESPONSES TO PHQ QUESTIONS 1-9: 5
10. IF YOU CHECKED OFF ANY PROBLEMS, HOW DIFFICULT HAVE THESE PROBLEMS MADE IT FOR YOU TO DO YOUR WORK, TAKE CARE OF THINGS AT HOME, OR GET ALONG WITH OTHER PEOPLE: SOMEWHAT DIFFICULT
SUM OF ALL RESPONSES TO PHQ QUESTIONS 1-9: 5

## 2024-01-04 NOTE — PROGRESS NOTES
Yumiko is a 33 year old who is being evaluated via a billable video visit.      How would you like to obtain your AVS? MyChart  If the video visit is dropped, the invitation should be resent by: Text to cell phone: [phone]  Will anyone else be joining your video visit? No          Assessment & Plan     Papillary thyroid carcinoma (H)  Plan: - considered this problem when making today's plans       -followed by specialist.   S/P thyroidectomy and is on levothyroxine     Seasonal affective disorder (H24)  Plan: no acute concerns as per patient   Managing well on own      8/11/2022     5:13 PM 10/24/2022     8:45 AM 1/4/2024     8:43 AM   PHQ   PHQ-9 Total Score 6 16 5   Q9: Thoughts of better off dead/self-harm past 2 weeks Several days Several days Not at all   F/U: Thoughts of suicide or self-harm  Yes    F/U: Self harm-plan  No    F/U: Self-harm action  No    F/U: Safety concerns  No         Raynaud's disease without gangrene  Plan: no acute concerns     Flu-like symptoms  Plan, symptomatic treatment in terms of good fluid hydration, relative rest, and use of oral counter medication as needed discussed in detail.    It seems that she is an upswing of viral illness, most symptoms have improved.  Regarding sinus congestion that seem to have improved as well specially with use of [place] pot.  Sinus drainage is clearing as well.  There is no additional benefit of antibiotic just yet.  She is encouraged to call us back or make a telephone only appointment should there be recurrence of sinus congestion in that case superadded vaginal infection treatment with antibiotic would be appropriate.      I spent a total of 20 minutes on the day of the visit.   Time spent by me doing chart review, history and exam, documentation and further activities per the note  {  Zoila Vera MD  [facility]    Subjective   Yumiko is a 33 year old, presenting for the following health issues:  No chief  complaint on file.      HPI     12/25-chills, feverish, body aches, fatigue  Covid negatives, multiple times since then  Has had flu symptoms before as well, feels that she knows the drill.  Has been improving over the course of time, wanted this appointment to make sure that she is in the right direction with symptoms.  She has travel planned next week from Monday through Thursday.    She also developed sinus headache , sinus congestions, day 2-3 of illness  Day 4-5 coughing and dry mostly, cough medications causes drowsiness, but it helped.  She feels most of her symptoms have improved specially sinus congestion and sinus drainage is mostly clear and scanty instead of thick and colored.  No sinus headache.      Fatigue is improving.  No change in taste or smell.  No breathing problems.  She does not have asthma.      Review of Systems status post thyroidectomy 2022 for papillary thyroid carcinoma.  She follows Dr. 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  Constitutional, HEENT, cardiovascular, pulmonary, GI, , musculoskeletal, neuro, skin, endocrine and psych systems are negative, except as otherwise noted.      Objective           Vitals:  No vitals were obtained today due to virtual visit.    Physical Exam   GENERAL: Healthy, alert and no distress  EYES: Eyes grossly normal to inspection.  No discharge or erythema, or obvious scleral/conjunctival abnormalities.  RESP: No audible wheeze, cough, or visible cyanosis.  No visible retractions or increased work of breathing.    SKIN: Visible skin clear. No significant rash, abnormal pigmentation or lesions.  NEURO: Cranial nerves grossly intact.  Mentation and speech appropriate for age.  PSYCH: Mentation appears normal, affect normal/bright, judgement and insight intact, normal speech and appearance well-groomed.        8/11/2022     5:13 PM 10/24/2022     8:45 AM 1/4/2024     8:43 AM   PHQ   PHQ-9 Total Score 6 16 5   Q9: Thoughts of better  off dead/self-harm past 2 weeks Several days Several days Not at all   F/U: Thoughts of suicide or self-harm  Yes    F/U: Self harm-plan  No    F/U: Self-harm action  No    F/U: Safety concerns  No             Video-Visit Details    Type of service:  Video Visit     Originating Location (pt. Location): Home    Distant Location (provider location):  On-site  Platform used for Video Visit: Yifan

## 2024-01-04 NOTE — ADDENDUM NOTE
Addended by: CRISTELA CASEY on: 1/4/2024 09:19 AM     Modules accepted: Orders     Orientation to room/Bed in low position, brakes on/Side rails x 2 or 4 up, assess large gaps, such that a patient could get extremity or other body part entrapped, use additional safety procedures/Use of non-skid footwear for ambulating patients, use of appropriate size clothing to prevent risk of tripping/Assess eliminations need, assist as needed/Call light is within reach, educate patient/family on its functionality/Environment clear of unused equipment, furniture's in place, clear of hazards/Assess for adequate lighting, leave nightlight on/Patient and family education available to parents and patient/Document fall prevention teaching and include in plan of care

## 2024-01-31 NOTE — PROGRESS NOTES
Spoke with pt. Scheduled to come in the lab Friday the 2nd    Video-Visit Details    Type of service:  Video Visit    Virtual visit conducted by Benny.      Originating Location (pt. Location): HOME    Distant Location (provider location):  Off site    Mode of Communication:  Video Conference via [email or web address]    Physician has received verbal consent for a Video Visit from the patient? YES      Marzena Lagunas MD    8/4/23    Yumiko Marquez  is being evaluated via a billable video visit.      Start time 1230, stop time 1250, chart review, documentation time, coordination of care, 10 minutes, total time spent day of the encounter 30 minutes.    HPI  #1  Multifocal Papillary thyroid cancer (T3, N0, Mx) , status post right hemithyroidectomy and isthumusectomy in November 2019-4.3 cm in size with 10% possibly poorly differentiated - now s/p completion thyroidectomy on 4/12/22  In 2019, the pt had  An ultrasound thyroid demonstrated multiple nodules in the right thyroid lobe, the largest was 3.7cm. 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  She underwent right thyroidectomy and isthmusectomy by Dr. Camargo on 11/26/2019.  Surgical pathology showed 4.3 cm papillary thyroid cancer, unifocal with clear margin.  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 The papillary thyroid cancer was well-circumscribed and encapsulated with predominantly follicular growth pattern.  However the multifocal growth of tall cells and a oncocytic changes and mitosis is focally increased up to 5 per 10 high-power fields.  The findings are consistent with focal poorly differentiated thyroid carcinoma of 10% however there was no capsular or angiolymphatic invasion.  Therefore this tumor is most likely not behave in an aggressive fashion.  No lymph node was removed.  Tumor staging [id number].   She was seen by endocrinology in 2020-completion thyroidectomy was recommended however the patient had deferred due to concerns about need for lifelong levothyroxine  replacement.  February 2022 neck ultrasound shows possibly enlarging nodule on the left side (around 5 mm). February 2022 TSH each normal, thyroglobulin around 19.6 (still has remaining left thyroid)    Pt completion thyroidectomy on 4/12/22. Pathology showed no malignancy in remaining thyroid.  She was discharged on levothyroxine at 112 mcg daily.      : In April 2022, pt felt poorly that with herTSH at 6.97, low T3 at 54, low free T3 at 1.5, and normal free T4 at 0.94.  Her potassium was also low at 3.2 and her white count was slightly higher at 11.4. Pt increased to levothyroxine at 125 mcg daily. Recheck TSH on 7/22 at 11.26 and I asked pt to increase to 125 mcg daily, 5 days per week and 250 mcg daily, 2 days per week.     Interval history: October 2022, TSH at 2.68.  Therefore her levothyroxine was increased.  Patient is on levothyroxine 175 mcg daily 6 days a week, and 262.5 mcg (1.5 table) daily for 1 day per week.  However, she often forgets to take the half tablet on Sundays.  May 2023 TSH at 0.10, thyroglobulin of 0.28.  She reports that she is tolerating her current dose of levothyroxine and denies any associated symptoms of overactive thyroid.,  Due to insurance issues, she would prefer to delay her imaging and lab testing until 2024 when she changes her insurance.    May 17, 2023 thyroglobulin antibody is undetectable, thyroglobulin 0.28, TSH 0.10, free t4 at 1.89     October 2022: TSH 2.68, thyroglobulin antibody undetectable, thyroglobulin 0.53     Pt completion thyroidectomy on 4/12/22 February 2022 TSH each normal, thyroglobulin around 19.6 (still has remaining left thyroid)      Past Medical History  Past Medical History:   Diagnosis Date     Cervical high risk HPV (human papillomavirus) test positive 02/25/2021 02/25/21     Complication of anesthesia      History of ITP 1995     PONV (postoperative nausea and vomiting)        Allergies  Allergies   Allergen Reactions     Seasonal Allergies       Medications  Current Outpatient Medications   Medication Sig Dispense Refill     citalopram (CELEXA) 10 MG tablet Take 1 tablet (10 mg) by mouth daily (Patient not taking: Reported on 5/17/2023) 30 tablet 1     etonogestrel-ethinyl estradiol (NUVARING) 0.12-0.015 MG/24HR vaginal ring Place 1 each vaginally every 28 days (Patient not taking: Reported on 5/17/2023) 3 each 3     levothyroxine (SYNTHROID/LEVOTHROID) 175 MCG tablet 1 tablet daily, 6 days per week and 1.5 tablet daily 110 tablet 3     LORazepam (ATIVAN) 0.5 MG tablet Take 1 tablet (0.5 mg) by mouth daily as needed for anxiety (panic attacks) 15 tablet 0     Family History  family history includes Heart Disease in her maternal grandfather; Melanoma (age of onset: 60) in her maternal grandmother; Thyroid Disease in her paternal aunt; Thyroid Disease (age of onset: 45) in her maternal grandfather; Valvular heart disease in her maternal grandfather.  Social History  Social History     Socioeconomic History     Marital status: Single     Spouse name: Not on file     Number of children: Not on file     Years of education: Not on file     Highest education level: Not on file   Occupational History     Not on file   Tobacco Use     Smoking status: Never     Smokeless tobacco: Never   Vaping Use     Vaping Use: Never used   Substance and Sexual Activity     Alcohol use: Yes     Comment: very litter      Drug use: Never     Sexual activity: Not Currently     Partners: Male     Birth control/protection: Condom   Other Topics Concern     Not on file   Social History Narrative     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 with 1 roomate     Social Determinants of Health     Financial Resource Strain: Not on file   Food Insecurity: Not on file   Transportation Needs: Not on file   Physical Activity: Not on file   Stress: Not on file   Social Connections: Not on file   Intimate Partner Violence: Not on file   Housing Stability: Not on file  "      ROS  Per HPI      Physical Exam    GENERAL: Healthy, alert and no distress\",\"EYES: Eyes grossly normal to inspection.  No discharge or erythema, or obvious scleral/conjunctival abnormalities.\",\"RESP: No audible wheeze, cough, or visible cyanosis.  No visible retractions or increased work of breathing.  \",\"SKIN: Visible skin clear. No significant rash, abnormal pigmentation or lesions.\",\"NEURO: Cranial nerves grossly intact.  Mentation and speech appropriate for age.\",\"PSYCH: Mentation appears normal, affect normal/bright, judgement and insight intact, normal speech and appearance well-groomed.    RESULTS    See Scanned Result 05/17/2023 THYROGLOBULIN AND ANTIBODY (SENDOUT TO [facility])-Scanned   Final     T3 Total 05/17/2023 110  85 - 202 ng/dL Final     Free T4 05/17/2023 1.89 (H)  0.90 - 1.70 ng/dL Final     TSH 05/17/2023 0.10 (L)  0.30 - 4.20 uIU/mL Final     ASSESSMENT:    #1  Multifocal Papillary thyroid cancer (T3, N0, Mx) , status post right hemithyroidectomy and isthumusectomy in November 2019-4.3 cm in size with 10% possibly poorly differentiated - now s/p completion thyroidectomy on 4/12/22  Discussed with patient that her remaining left thyroid did not show any evidence for malignancy.  Extensive discussion with patient about radioactive iodine ablation.  Although my preference is that the patient consider radioactive iodine ablation, she would prefer to observe at this time.  She did have a completion thyroidectomy.  She continues on suppressive levothyroxine therapy, although she often forgets to take the extra half tablet on Sunday.    To simplify her program, we will have her take levothyroxine 175 mcg daily for 1 week, and then 175 mcg daily first 6 days a week and 350 mcg daily (2 tablets) for 1 day/week.  She will then alternate this program.  Because of her concerns about cost, prescription sent to [facility].  She will let me know if this works out for her.    In terms of further " imaging and laboratory evaluation, she would prefer to change her insurance in November 2023 and then had this performed in January 2024.  Orders noted below.    Return visit in 1 year.    Orders Placed This Encounter   Procedures     X-Ray Chest PA and Lateral     US head neck soft tissue     TSH     T4 free     Thyroglobulin and Antibody (Sendout to [facility])

## 2024-02-02 ENCOUNTER — TELEPHONE (OUTPATIENT)
Dept: ENDOCRINOLOGY | Facility: CLINIC | Age: 34
End: 2024-02-02
Payer: COMMERCIAL

## 2024-02-02 NOTE — TELEPHONE ENCOUNTER
LVM and [facility] sent. Clinic number provided for response.   Lesli White, RN on 2/2/2024 at 9:17 AM       RE    Message  Received: Yesterday  Marzena Lagunas MD   [facility] Triage-  Please call and ask patient if she is willing to do the evaluation as noted below.     -     Patient call:       Appointment type: return endocrine   Provider: Bebo   Return date:Aug 2024   Speciality phone number: [phone]   Additional appointment(s) needed: testing in jan 2024; x-ray, lab, and US   Additional notes: LVM and sent myc x1   Yuriy Matthews on 9/14/2023 at 12:23 PM

## 2024-02-13 ENCOUNTER — TELEPHONE (OUTPATIENT)
Dept: ENDOCRINOLOGY | Facility: CLINIC | Age: 34
End: 2024-02-13
Payer: COMMERCIAL

## 2024-02-13 NOTE — TELEPHONE ENCOUNTER
Spoke with pt and r/s appt with Dr. Lagunas; pt will call to schedule testing Yuriy Matthews on 2/13/2024 at 10:39 AM

## 2024-04-17 ENCOUNTER — PATIENT OUTREACH (OUTPATIENT)
Dept: CARE COORDINATION | Facility: CLINIC | Age: 34
End: 2024-04-17
Payer: COMMERCIAL

## 2024-05-01 ENCOUNTER — PATIENT OUTREACH (OUTPATIENT)
Dept: CARE COORDINATION | Facility: CLINIC | Age: 34
End: 2024-05-01
Payer: COMMERCIAL

## 2024-06-07 ENCOUNTER — TELEPHONE (OUTPATIENT)
Dept: ENDOCRINOLOGY | Facility: CLINIC | Age: 34
End: 2024-06-07
Payer: COMMERCIAL

## 2024-06-07 DIAGNOSIS — C73 PAPILLARY THYROID CARCINOMA (H): Primary | ICD-10-CM

## 2024-06-07 RX ORDER — LEVOTHYROXINE SODIUM 175 UG/1
TABLET ORAL
Qty: 8 TABLET | Refills: 0 | Status: SHIPPED | OUTPATIENT
Start: 2024-06-07 | End: 2024-09-06

## 2024-06-07 NOTE — TELEPHONE ENCOUNTER
[facility] Call Center    Phone Message    May a detailed message be left on voicemail: yes     Reason for Call: Medication Refill Request    Has the patient contacted the pharmacy for the refill? Yes   Name of medication being requested: levothyroxine (SYNTHROID/LEVOTHROID) 175 MCG tablet   Provider who prescribed the medication: Chow  Pharmacy: [facility]  [address] [facility]   Phone number: [phone]  Date medication is needed: ASAP  Patient is camping and forgot her medication at home, wondering if you can send in a prescription for a week's worth to pharmacy above. Please call and let patient know when this has been sent.

## 2024-06-07 NOTE — TELEPHONE ENCOUNTER
levothyroxine (SYNTHROID/LEVOTHROID) 175 MCG tablet   1 tablet daily 6 days per week and 2 tablets daily for 1 day per week,   Provider who prescribed the medication: Chow  Pharmacy: [facility] [address] [facility]   Phone number: [phone]  Date medication is needed: ASAP      Refill sent as requested, left message for patient.

## 2024-07-14 ENCOUNTER — HEALTH MAINTENANCE LETTER (OUTPATIENT)
Age: 34
End: 2024-07-14

## 2024-07-24 ENCOUNTER — ANCILLARY PROCEDURE (OUTPATIENT)
Dept: ULTRASOUND IMAGING | Facility: CLINIC | Age: 34
End: 2024-07-24
Attending: INTERNAL MEDICINE
Payer: COMMERCIAL

## 2024-07-24 ENCOUNTER — TELEPHONE (OUTPATIENT)
Dept: ENDOCRINOLOGY | Facility: CLINIC | Age: 34
End: 2024-07-24

## 2024-07-24 ENCOUNTER — ANCILLARY PROCEDURE (OUTPATIENT)
Dept: GENERAL RADIOLOGY | Facility: CLINIC | Age: 34
End: 2024-07-24
Attending: INTERNAL MEDICINE
Payer: COMMERCIAL

## 2024-07-24 ENCOUNTER — LAB (OUTPATIENT)
Dept: LAB | Facility: CLINIC | Age: 34
End: 2024-07-24
Payer: COMMERCIAL

## 2024-07-24 DIAGNOSIS — C73 PAPILLARY THYROID CARCINOMA (H): ICD-10-CM

## 2024-07-24 DIAGNOSIS — Z00.00 ROUTINE GENERAL MEDICAL EXAMINATION AT A HEALTH CARE FACILITY: ICD-10-CM

## 2024-07-24 LAB
T4 FREE SERPL-MCNC: 1.57 NG/DL (ref 0.9–1.7)
TSH SERPL DL<=0.005 MIU/L-ACNC: 1.01 UIU/ML (ref 0.3–4.2)

## 2024-07-24 PROCEDURE — 71046 X-RAY EXAM CHEST 2 VIEWS: CPT | Mod: GC | Performed by: STUDENT IN AN ORGANIZED HEALTH CARE EDUCATION/TRAINING PROGRAM

## 2024-07-24 PROCEDURE — 36415 COLL VENOUS BLD VENIPUNCTURE: CPT | Performed by: PATHOLOGY

## 2024-07-24 PROCEDURE — 86800 THYROGLOBULIN ANTIBODY: CPT | Performed by: INTERNAL MEDICINE

## 2024-07-24 PROCEDURE — 84439 ASSAY OF FREE THYROXINE: CPT | Performed by: PATHOLOGY

## 2024-07-24 PROCEDURE — 76536 US EXAM OF HEAD AND NECK: CPT | Mod: GC | Performed by: RADIOLOGY

## 2024-07-24 PROCEDURE — 84443 ASSAY THYROID STIM HORMONE: CPT | Performed by: PATHOLOGY

## 2024-07-24 NOTE — TELEPHONE ENCOUNTER
"Chest x-ray negative as well    -  Dear Yumiko    Here is your neck ultrasound.  You have lymph nodes in your neck which are normal looking.  It is normal to have lymph nodes in the neck and  all they have a \"fatty hilum\" which is suggestive of benign nature.  This is great news.    If you have any questions, please feel free to contact my nurse at [phone] select option #3 for triage nurse  or  option #1 for scheduling related questions.    Regards    Marzena Lagunas MD     -  Narrative & Impression   EXAMINATION: US HEAD NECK SOFT TISSUE, 7/24/2024 8:59 AM      COMPARISON: Neck ultrasound, 12/10/2021     HISTORY: Papillary thyroid carcinoma (H)     TECHNIQUE: Sonographic imaging performed of the neck to evaluate for  lymph nodes using grey scale and limited Doppler technique.     FINDINGS:     Lymph nodes are measured bilaterally with measurements given in  transverse, AP, and craniocaudal dimensions as follows:     Right:  Level 2: 1.5 x 0.6 x 2.7 cm reniform lymph node with preserved fatty  hilum  Level 3: No prominent/abnormal cervical lymph nodes at this level  Level 4: No prominent/abnormal cervical lymph nodes at this level  Level 5: No prominent/abnormal cervical lymph nodes at this level  Level 6: No prominent/abnormal cervical lymph nodes at this level  Level 7: No prominent/abnormal cervical lymph nodes at this level     Left:  Level 2:   *  1.1 x 0.5 x 1.9 cm reniform lymph node with preserved fatty hilum  *  0.9 x 0.5 x 1.9 cm reniform lymph node with preserved fatty hilum  and hilar vascularity.  Level 3: No prominent/abnormal cervical lymph nodes at this level  Level 4: 0.9 x 0.8 x 1.3 cm reniform lymph node with preserved fatty  hilum and hilar vascularity  Level 5: No prominent/abnormal cervical lymph nodes at this level  Level 6: No prominent/abnormal cervical lymph nodes at this level  Level 7: No prominent/abnormal cervical lymph nodes at this level                                                 "                      IMPRESSION:  1.  Soft tissue neck ultrasound with lymph node measurements as  described above.     I have personally reviewed the examination and initial interpretation  and I agree with the findings.     FILIBERTO DALTON MD      Narrative & Impression   Exam: XR CHEST 2 VIEWS, 7/24/2024 8:38 AM     Comparison: CXR 4/22/22022     History: Papillary thyroid carcinoma (H)     Findings:  Two views of the chest. Trachea is midline. Cardiomediastinal  silhouette is within normal limits. No focal airspace opacity. No  pneumothorax or pleural effusion. The visualized upper abdomen is  unremarkable. .                                                                      Impression: No acute airspace opacity.     I have personally reviewed the examination and initial interpretation  and I agree with the findings.     JOHN PÉREZ MD

## 2024-07-30 LAB — SCANNED LAB RESULT: NORMAL

## 2024-08-02 ENCOUNTER — TELEPHONE (OUTPATIENT)
Dept: ENDOCRINOLOGY | Facility: CLINIC | Age: 34
End: 2024-08-02
Payer: COMMERCIAL

## 2024-08-02 NOTE — TELEPHONE ENCOUNTER
Patient has upcoming appointment.  Thyroglobulin level similar to previous values, when considering TSH.    -  Dear Yumiko    Here are your labs which show that your thyroglobulin levels are similar to previous values.  We will discuss more at your return visit.    If you have any questions, please feel free to contact my nurse at [phone] select option #3 for triage nurse  or  option #1 for scheduling related questions.    Regards    Marzena Lagunas MD       July 2024 TSH 1.01, thyroglobulin antibody undetectable, thyroglobulin 0.38    May 2023 TSH 0.10, thyroglobulin antibody undetectable, thyroglobulin 0.28    October 2022 TSH 2.68, thyroglobulin antibody undetectable, thyroglobulin 0.46

## 2024-09-06 ENCOUNTER — VIRTUAL VISIT (OUTPATIENT)
Dept: ENDOCRINOLOGY | Facility: CLINIC | Age: 34
End: 2024-09-06
Payer: COMMERCIAL

## 2024-09-06 DIAGNOSIS — C73 PAPILLARY THYROID CARCINOMA (H): ICD-10-CM

## 2024-09-06 PROCEDURE — 99213 OFFICE O/P EST LOW 20 MIN: CPT | Mod: 95 | Performed by: INTERNAL MEDICINE

## 2024-09-06 RX ORDER — LEVOTHYROXINE SODIUM 175 UG/1
TABLET ORAL
Qty: 140 TABLET | Refills: 4 | Status: SHIPPED | OUTPATIENT
Start: 2024-09-06

## 2024-09-06 ASSESSMENT — PAIN SCALES - GENERAL: PAINLEVEL: NO PAIN (0)

## 2024-09-06 NOTE — NURSING NOTE
Current patient location: Patient declined to provide     Is the patient currently in the state of [place]? YES    Visit mode:VIDEO    If the visit is dropped, the patient can be reconnected by: VIDEO VISIT: Text to cell phone:   Telephone Information:   Mobile [phone]       Will anyone else be joining the visit? NO  (If patient encounters technical issues they should call [phone]    How would you like to obtain your AVS? MyChart    Are changes needed to the allergy or medication list? No    Are refills needed on medications prescribed by this physician? NO    Rooming Documentation:  Questionnaire(s) not done per department protocol      Reason for visit: RECHECK Shelby Kocher VVF

## 2024-09-06 NOTE — LETTER
9/6/2024       RE: Yumiko Marquez  [address]  [facility] [address]     Dear Colleague,    Thank you for referring your patient, Yumiko Marquez, to the [facility] [place] [facility]. Please see a copy of my visit note below.    Video-Visit Details    Type of service:  Video Visit    Virtual visit conducted by Benny.      Originating Location (pt. Location): HOME    Distant Location (provider location):  Off site    Mode of Communication:  Video Conference via MobileIron    Physician has received verbal consent for a Video Visit from the patient? YES      Marzena Lagunas MD    9/6/24    1230, stop time 1250, total time spent day of encounter 20 minutes    HPI  #1  Multifocal Papillary thyroid cancer (T3, N0, Mx) , status post right hemithyroidectomy and isthumusectomy in November 2019-4.3 cm in size with 10% possibly poorly differentiated - now s/p completion thyroidectomy on 4/12/22  In 2019, the pt had  An ultrasound thyroid demonstrated multiple nodules in the right thyroid lobe, the largest was 3.7cm. 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  She underwent right thyroidectomy and isthmusectomy by Dr. Camargo on 11/26/2019.  Surgical pathology showed 4.3 cm papillary thyroid cancer, unifocal with clear margin.  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 The papillary thyroid cancer was well-circumscribed and encapsulated with predominantly follicular growth pattern.  However the multifocal growth of tall cells and a oncocytic changes and mitosis is focally increased up to 5 per 10 high-power fields.  The findings are consistent with focal poorly differentiated thyroid carcinoma of 10% however there was no capsular or angiolymphatic invasion.  Therefore this tumor is most likely not behave in an aggressive fashion.  No lymph node was removed.  Tumor staging pT3aNx.   She  was seen by endocrinology in 2020-completion thyroidectomy was recommended however the patient had deferred due to concerns about need for lifelong levothyroxine replacement.  February 2022 neck ultrasound shows possibly enlarging nodule on the left side (around 5 mm). February 2022 TSH each normal, thyroglobulin around 19.6 (still has remaining left thyroid)    Pt completion thyroidectomy on 4/12/22. Pathology showed no malignancy in remaining thyroid.  She was discharged on levothyroxine at 112 mcg daily.      n April 2022, pt felt poorly that with herTSH at 6.97, low T3 at 54, low free T3 at 1.5, and normal free T4 at 0.94.  Her potassium was also low at 3.2 and her white count was slightly higher at 11.4. Pt increased to levothyroxine at 125 mcg daily. Recheck TSH on 7/22 at 11.26 and I asked pt to increase to 125 mcg daily, 5 days per week and 250 mcg daily, 2 days per week.     October 2022: TSH 2.68, thyroglobulin antibody undetectable, thyroglobulin 0.53    May 2023 TSH at 0.10, thyroglobulin of 0.28.      Interval history: Currently on levothyroxine 175 mcg daily for 7 days a week, alternating with levothyroxine 175 mcg daily 6 days a week and 350 mcg daily 1 day/week.  This translates to an extra 2 tablets of levothyroxine per month.  She reports recent travel that has resulted in her sometimes forgetting her levothyroxine.  July 2024 TSH 1.01, thyroglobulin 0.38, neck ultrasound showed normal-appearing lymph nodes, chest x-ray was negative.  She remains very concerned about radioactive iodine effects on the salivary glands.    History of thyroglobulin:  July 2024 TSH 1.01, thyroglobulin 0.38    May 17, 2023 thyroglobulin antibody is undetectable, thyroglobulin 0.28, TSH 0.10, free t4 at 1.89     October 2022: TSH 2.68, thyroglobulin antibody undetectable, thyroglobulin 0.53     Pt completion thyroidectomy on 4/12/22 February 2022 TSH each normal, thyroglobulin around 19.6 (still has remaining left  thyroid)     Past Medical History  Past Medical History:   Diagnosis Date     Cervical high risk HPV (human papillomavirus) test positive 02/25/2021 02/25/21     Complication of anesthesia      History of ITP 1995     PONV (postoperative nausea and vomiting)        Allergies  Allergies   Allergen Reactions     Seasonal Allergies      Medications  Current Outpatient Medications   Medication Sig Dispense Refill     levothyroxine (SYNTHROID/LEVOTHROID) 175 MCG tablet 1 tablet daily 6 days per week and 2 tablets daily for 1 day per week, 8 tablet 0     levothyroxine (SYNTHROID/LEVOTHROID) 175 MCG tablet 1 tablet daily for 1 week and 1 tablet daily 6 days per week and 2 tablets daily for 1 day per week 140 tablet 3     LORazepam (ATIVAN) 0.5 MG tablet Take 1 tablet (0.5 mg) by mouth daily as needed for anxiety (panic attacks) 15 tablet 0     Family History  family history includes Heart Disease in her maternal grandfather; Melanoma (age of onset: 60) in her maternal grandmother; Thyroid Disease in her paternal aunt; Thyroid Disease (age of onset: 45) in her maternal grandfather; Valvular heart disease in her maternal grandfather.  Social History  Social History     Socioeconomic History     Marital status: Single     Spouse name: Not on file     Number of children: Not on file     Years of education: Not on file     Highest education level: Not on file   Occupational History     Not on file   Tobacco Use     Smoking status: Never     Smokeless tobacco: Never   Vaping Use     Vaping status: Never Used   Substance and Sexual Activity     Alcohol use: Yes     Comment: very litter      Drug use: Never     Sexual activity: Not Currently     Partners: Male     Birth control/protection: Condom   Other Topics Concern     Not on file   Social History Narrative     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 with 1 roomate     Social Determinants of Health     Financial Resource Strain: Low Risk  (1/4/2024)  "   Financial Resource Strain      Within the past 12 months, have you or your family members you live with been unable to get utilities (heat, electricity) when it was really needed?: No   Food Insecurity: Low Risk  (1/4/2024)    Food Insecurity      Within the past 12 months, did you worry that your food would run out before you got money to buy more?: No      Within the past 12 months, did the food you bought just not last and you didn t have money to get more?: No   Transportation Needs: Low Risk  (1/4/2024)    Transportation Needs      Within the past 12 months, has lack of transportation kept you from medical appointments, getting your medicines, non-medical meetings or appointments, work, or from getting things that you need?: No   Physical Activity: Not on file   Stress: Not on file   Social Connections: Not on file   Interpersonal Safety: Not on file   Housing Stability: Low Risk  (1/4/2024)    Housing Stability      Do you have housing? : Yes      Are you worried about losing your housing?: No       ROS  Per HPI      Physical Exam    GENERAL: Healthy, alert and no distress\",\"EYES: Eyes grossly normal to inspection.  No discharge or erythema, or obvious scleral/conjunctival abnormalities.\",\"RESP: No audible wheeze, cough, or visible cyanosis.  No visible retractions or increased work of breathing.  \",\"SKIN: Visible skin clear. No significant rash, abnormal pigmentation or lesions.\",\"NEURO: Cranial nerves grossly intact.  Mentation and speech appropriate for age.\",\"PSYCH: Mentation appears normal, affect normal/bright, judgement and insight intact, normal speech and appearance well-groomed.    RESULTS  No visits with results within 1 Week(s) from this visit.   Latest known visit with results is:   Lab on 07/24/2024   Component Date Value Ref Range Status     TSH 07/24/2024 1.01  0.30 - 4.20 uIU/mL Final     Free T4 07/24/2024 1.57  0.90 - 1.70 ng/dL Final     See Scanned Result 07/24/2024 THYROGLOBULIN AND " ANTIBODY (SENDOUT TO [facility])-Scanned   Final           arrative & Impression   EXAMINATION: US HEAD NECK SOFT TISSUE, 7/24/2024 8:59 AM      COMPARISON: Neck ultrasound, 12/10/2021     HISTORY: Papillary thyroid carcinoma (H)     TECHNIQUE: Sonographic imaging performed of the neck to evaluate for  lymph nodes using grey scale and limited Doppler technique.     FINDINGS:     Lymph nodes are measured bilaterally with measurements given in  transverse, AP, and craniocaudal dimensions as follows:     Right:  Level 2: 1.5 x 0.6 x 2.7 cm reniform lymph node with preserved fatty  hilum  Level 3: No prominent/abnormal cervical lymph nodes at this level  Level 4: No prominent/abnormal cervical lymph nodes at this level  Level 5: No prominent/abnormal cervical lymph nodes at this level  Level 6: No prominent/abnormal cervical lymph nodes at this level  Level 7: No prominent/abnormal cervical lymph nodes at this level     Left:  Level 2:   *  1.1 x 0.5 x 1.9 cm reniform lymph node with preserved fatty hilum  *  0.9 x 0.5 x 1.9 cm reniform lymph node with preserved fatty hilum  and hilar vascularity.  Level 3: No prominent/abnormal cervical lymph nodes at this level  Level 4: 0.9 x 0.8 x 1.3 cm reniform lymph node with preserved fatty  hilum and hilar vascularity  Level 5: No prominent/abnormal cervical lymph nodes at this level  Level 6: No prominent/abnormal cervical lymph nodes at this level  Level 7: No prominent/abnormal cervical lymph nodes at this level                                                                      IMPRESSION:  1.  Soft tissue neck ultrasound with lymph node measurements as  described above.     I have personally reviewed the examination and initial interpretation  and I agree with the findings.     FILIBERTO DALTON MD         SYSTEM ID:  [id number]       Narrative & Impression  Exam: XR CHEST 2 VIEWS, 7/24/2024 8:38 AM     Comparison: CXR 4/22/22022     History: Papillary thyroid carcinoma (H)    "  Findings:  Two views of the chest. Trachea is midline. Cardiomediastinal  silhouette is within normal limits. No focal airspace opacity. No  pneumothorax or pleural effusion. The visualized upper abdomen is  unremarkable. .                                                                      Impression: No acute airspace opacity.     I have personally reviewed the examination and initial interpretation  and I agree with the findings.     JOHN PÉREZ MD         SYSTEM ID:  [id number]\    ASSESSMENT:    #1  Multifocal Papillary thyroid cancer (T3, N0, Mx) , status post right hemithyroidectomy and isthumusectomy in November 2019-4.3 cm in size with 10% possibly poorly differentiated - now s/p completion thyroidectomy on 4/12/22  Discussed with patient that her remaining left thyroid did not show any evidence for malignancy.  Extensive discussion with patient about radioactive iodine ablation.  Although my preference is that the patient consider radioactive iodine ablation, she would prefer to observe at this time.  She did have a completion thyroidectomy.  I discussed again with patient and she remains uninterested in the radioactive iodine, she is comfortable with observation at this point.  She is also especially concerned about side effects associate with radioactive iodine treatment including impact on the salivary glands.  She understands that \"to be thorough\" that we could consider to radioactive iodine given the pathology of noted tall cells and focal poorly differentiated carcinoma.  That being said, she did have a total thyroidectomy, her thyroglobulin is essentially remained stable, and there is no evidence for recurrent disease.    Will plan on having patient recheck TSH, free T4, and thyroglobulin in 6 months.  Return visit in 1 year with repeat thyroid ultrasound and chest x-ray.            Again, thank you for allowing me to participate in the care of your patient.      Sincerely,    Marzena Lagunas, " MD

## 2024-09-13 ENCOUNTER — TELEPHONE (OUTPATIENT)
Dept: ENDOCRINOLOGY | Facility: CLINIC | Age: 34
End: 2024-09-13
Payer: COMMERCIAL

## 2024-09-13 NOTE — TELEPHONE ENCOUNTER
Left Voicemail (1st Attempt) and Sent Mychart (1st Attempt) for the patient to call back and schedule the following:    Appointment type: return endo  Provider: Bebo  Return date: September 2025  Specialty phone number: [phone]  Additional appointment(s) needed: labs, Xray, US (see below)  Additonal Notes:   LVM, MyC x1    Labs to be done in February 2025    Follow up in September 2025 with labs, US, and Xray prior to follow up.    Per checkout comments: Labs in 6 months , return in 1 year.     Gail Atkins on 9/13/2024 at 3:49 PM

## 2024-09-17 NOTE — TELEPHONE ENCOUNTER
Left Voicemail (2nd Attempt) for the patient to call back and schedule the following:    Appointment type: return endo  Provider: Bebo  Return date: September 2025  Specialty phone number: [phone]  Additional appointment(s) needed: labs, Xray, US (see below)  Additonal Notes:   LVM x1     Labs to be done in February 2025     Follow up in September 2025 with labs, US, and Xray prior to follow up.     Per checkout comments: Labs in 6 months , return in 1 year.     Gail Atkins on 9/17/2024 at 11:53 AM

## 2024-10-07 ENCOUNTER — OFFICE VISIT (OUTPATIENT)
Dept: FAMILY MEDICINE | Facility: CLINIC | Age: 34
End: 2024-10-07
Payer: COMMERCIAL

## 2024-10-07 ENCOUNTER — MYC MEDICAL ADVICE (OUTPATIENT)
Dept: FAMILY MEDICINE | Facility: CLINIC | Age: 34
End: 2024-10-07

## 2024-10-07 VITALS
HEART RATE: 47 BPM | OXYGEN SATURATION: 97 % | RESPIRATION RATE: 16 BRPM | WEIGHT: 154 LBS | SYSTOLIC BLOOD PRESSURE: 113 MMHG | TEMPERATURE: 97.9 F | BODY MASS INDEX: 24.17 KG/M2 | DIASTOLIC BLOOD PRESSURE: 69 MMHG | HEIGHT: 67 IN

## 2024-10-07 DIAGNOSIS — Z11.51 SCREENING FOR HUMAN PAPILLOMAVIRUS: ICD-10-CM

## 2024-10-07 DIAGNOSIS — Z00.00 WELL ADULT EXAM: Primary | ICD-10-CM

## 2024-10-07 DIAGNOSIS — R87.810 CERVICAL HIGH RISK HPV (HUMAN PAPILLOMAVIRUS) TEST POSITIVE: ICD-10-CM

## 2024-10-07 DIAGNOSIS — R00.1 BRADYCARDIA: ICD-10-CM

## 2024-10-07 PROCEDURE — 99395 PREV VISIT EST AGE 18-39: CPT | Performed by: FAMILY MEDICINE

## 2024-10-07 PROCEDURE — G0124 SCREEN C/V THIN LAYER BY MD: HCPCS | Performed by: PATHOLOGY

## 2024-10-07 PROCEDURE — 87624 HPV HI-RISK TYP POOLED RSLT: CPT | Performed by: FAMILY MEDICINE

## 2024-10-07 PROCEDURE — G0145 SCR C/V CYTO,THINLAYER,RESCR: HCPCS | Performed by: FAMILY MEDICINE

## 2024-10-07 SDOH — HEALTH STABILITY: PHYSICAL HEALTH: ON AVERAGE, HOW MANY DAYS PER WEEK DO YOU ENGAGE IN MODERATE TO STRENUOUS EXERCISE (LIKE A BRISK WALK)?: 4 DAYS

## 2024-10-07 ASSESSMENT — SOCIAL DETERMINANTS OF HEALTH (SDOH): HOW OFTEN DO YOU GET TOGETHER WITH FRIENDS OR RELATIVES?: MORE THAN THREE TIMES A WEEK

## 2024-10-07 ASSESSMENT — PATIENT HEALTH QUESTIONNAIRE - PHQ9
SUM OF ALL RESPONSES TO PHQ QUESTIONS 1-9: 1
SUM OF ALL RESPONSES TO PHQ QUESTIONS 1-9: 1
10. IF YOU CHECKED OFF ANY PROBLEMS, HOW DIFFICULT HAVE THESE PROBLEMS MADE IT FOR YOU TO DO YOUR WORK, TAKE CARE OF THINGS AT HOME, OR GET ALONG WITH OTHER PEOPLE: NOT DIFFICULT AT ALL

## 2024-10-07 NOTE — PROGRESS NOTES
Preventive Care Visit  [facility]  ElginLuisana Holder MD, Family Medicine  Oct 7, 2024      Assessment & Plan     (Z00.00) Well adult exam  (primary encounter diagnosis)  Comment:   Plan: Lipid panel reflex to direct LDL Fasting,         Glucose, Vitamin D Deficiency            (R87.810) Cervical high risk HPV (human papillomavirus) test positive  Comment: reviewed concerns for HR HPV and risk of cervical cancer  Advised colposcopy if this is still on pap from today  Plan: HPV and Gynecologic Cytology Panel -         Recommended Age 30-65 Years            (Z11.51) Screening for human papillomavirus  Comment:   Plan: HPV and Gynecologic Cytology Panel -         Recommended Age 30-65 Years            (R00.1) Bradycardia  Comment: isolated bradycardia - rechecking TSH  Sent her instructions on checking her heart rate and monitoring this.  If this is persistent or if she has any dizziness thenneeds EKG and to be seen   Plan: EKG 12-lead complete w/read - Clinics          Encouraged a travel visit for vaccines and guidance on upcoming travel               Counseling  Appropriate preventive services were addressed with this patient via screening, questionnaire, or discussion as appropriate for fall prevention, nutrition, physical activity, Tobacco-use cessation, social engagement, weight loss and cognition.  Checklist reviewing preventive services available has been given to the patient.  Reviewed patient's diet, addressing concerns and/or questions.   Discussed possible causes of fatigue.         Diane Claderon is a 33 year old, presenting for the following:  No chief complaint on file.        10/7/2024    11:15 AM   Additional Questions   Roomed by Tamara BLAIR MA   Accompanied by self         10/7/2024    11:15 AM   Patient Reported Additional Medications   Patient reports taking the following new medications none        Health Care Directive  Patient does not have a Health Care Directive or  Living Will:     FLORA Calderon, 33 years old, with a history of papillary thyroid carcinoma, who presents for physical. Has a history of high risk HPV 2021, 2022, 2023. Had been recommended to follow up with a colposcopy but has not related to scheduling issues and insurance coverage concerns. 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 Requested medical release form.         10/7/2024   General Health   How would you rate your overall physical health? Good   Feel stress (tense, anxious, or unable to sleep) To some extent      (!) STRESS CONCERN      10/7/2024   Nutrition   Diet: Regular (no restrictions)            10/7/2024   Exercise   Days per week of moderate/strenous exercise 4 days            10/7/2024   Social Factors   Frequency of gathering with friends or relatives More than three times a week   Worry food won't last until get money to buy more No   Food not last or not have enough money for food? No   Do you have housing? (Housing is defined as stable permanent housing and does not include staying ouside in a car, in a tent, in an abandoned building, in an overnight shelter, or couch-surfing.) Yes   Are you worried about losing your housing? No   Lack of transportation? No   Unable to get utilities (heat,electricity)? No            10/7/2024   Fall Risk   Fallen 2 or more times in the past year? No   Trouble with walking or balance? No             10/7/2024   Dental   Dentist two times every year? Yes            10/7/2024   TB Screening   Were you born outside of the [place]? No          Today's PHQ-9 Score:       10/7/2024    11:10 AM   PHQ-9 SCORE   PHQ-9 Total Score MyChart 1 (Minimal depression)   PHQ-9 Total Score 1         10/7/2024   Substance Use   Alcohol more than 3/day or more than 7/wk No   Do you use any other substances recreationally? No        Social History     Tobacco Use    Smoking status: Never    Smokeless tobacco: Never   Vaping Use    Vaping status: Never Used   Substance  "Use Topics    Alcohol use: Yes     Comment: very litter     Drug use: Never                History of abnormal Pap smear: YES - reflected in Problem List and Health Maintenance accordingly        Latest Ref Rng & Units 5/17/2023     4:13 PM 8/11/2022    11:07 AM 2/25/2021    12:03 PM   PAP / HPV   PAP  Negative for Intraepithelial Lesion or Malignancy (NILM)  Negative for Intraepithelial Lesion or Malignancy (NILM)     HPV 16 DNA Negative Negative  Negative  Negative    HPV 18 DNA Negative Negative  Negative  Negative    Other HR HPV Negative Positive  Positive  Positive            10/7/2024   Contraception/Family Planning   Questions about contraception or family planning No           Reviewed and updated as needed this visit by Provider                          Review of Systems  Constitutional, HEENT, cardiovascular, pulmonary, gi and gu systems are negative, except as otherwise noted.     Objective    Exam  /69   Pulse (!) 47   Temp 97.9  F (36.6  C) (Temporal)   Resp 16   Wt 69.9 kg (154 lb)   SpO2 97%   BMI 24.12 kg/m     Estimated body mass index is 24.12 kg/m  as calculated from the following:    Height as of 5/17/23: 1.702 m (5' 7\").    Weight as of this encounter: 69.9 kg (154 lb).    Physical Exam  GENERAL: alert and no distress  EYES: Eyes grossly normal to inspection, PERRL and conjunctivae and sclerae normal  HENT: ear canals and TM's normal, nose and mouth without ulcers or lesions  NECK: no adenopathy, no asymmetry, masses, or scars  RESP: lungs clear to auscultation - no rales, rhonchi or wheezes  BREAST: normal without masses, tenderness or nipple discharge and no palpable axillary masses or adenopathy  CV: regular rate and rhythm, normal S1 S2, no S3 or S4, no murmur, click or rub, no peripheral edema  ABDOMEN: soft, nontender, no hepatosplenomegaly, no masses and bowel sounds normal   (female): normal female external genitalia, normal urethral meatus, normal vaginal mucosa  MS: no " gross musculoskeletal defects noted, no edema  SKIN: no suspicious lesions or rashes  NEURO: Normal strength and tone, mentation intact and speech normal  PSYCH: mentation appears normal, affect normal/bright      I was present with the NPP student, Yudelka Palencia RN, who participated in the service and in the documentation of the services provided. I have verified the history and personally performed the physical exam and medical decision making, as documented by the student and edited by me      Signed Electronically by: Aleta Holder MD    Answers submitted by the patient for this visit:  Patient Health Questionnaire (Submitted on 10/7/2024)  If you checked off any problems, how difficult have these problems made it for you to do your work, take care of things at home, or get along with other people?: Not difficult at all  PHQ9 TOTAL SCORE: 1

## 2024-10-07 NOTE — PATIENT INSTRUCTIONS
Colposcopy:    Call [phone] to [facility] or [phone] for our clinic.  You should get a call from the nurse schedulers as well  Patient Education   Preventive Care Advice   This is general advice given by our system to help you stay healthy. However, your care team may have specific advice just for you. Please talk to your care team about your preventive care needs.  Nutrition  Eat 5 or more servings of fruits and vegetables each day.  Try wheat bread, brown rice and whole grain pasta (instead of white bread, rice, and pasta).  Get enough calcium and vitamin D. Check the label on foods and aim for 100% of the RDA (recommended daily allowance).  Lifestyle  Exercise at least 150 minutes each week  (30 minutes a day, 5 days a week).  Do muscle strengthening activities 2 days a week. These help control your weight and prevent disease.  No smoking.  Wear sunscreen to prevent skin cancer.  Have a dental exam and cleaning every 6 months.  Yearly exams  See your health care team every year to talk about:  Any changes in your health.  Any medicines your care team has prescribed.  Preventive care, family planning, and ways to prevent chronic diseases.  Shots (vaccines)   HPV shots (up to age 26), if you've never had them before.  Hepatitis B shots (up to age 59), if you've never had them before.  COVID-19 shot: Get this shot when it's due.  Flu shot: Get a flu shot every year.  Tetanus shot: Get a tetanus shot every 10 years.  Pneumococcal, hepatitis A, and RSV shots: Ask your care team if you need these based on your risk.  Shingles shot (for age 50 and up)  General health tests  Diabetes screening:  Starting at age 35, Get screened for diabetes at least every 3 years.  If you are younger than age 35, ask your care team if you should be screened for diabetes.  Cholesterol test: At age 39, start having a cholesterol test every 5 years, or more often if advised.  Bone density scan (DEXA): At age  50, ask your care team if you should have this scan for osteoporosis (brittle bones).  Hepatitis C: Get tested at least once in your life.  STIs (sexually transmitted infections)  Before age 24: Ask your care team if you should be screened for STIs.  After age 24: Get screened for STIs if you're at risk. You are at risk for STIs (including HIV) if:  You are sexually active with more than one person.  You don't use condoms every time.  You or a partner was diagnosed with a sexually transmitted infection.  If you are at risk for HIV, ask about PrEP medicine to prevent HIV.  Get tested for HIV at least once in your life, whether you are at risk for HIV or not.  Cancer screening tests  Cervical cancer screening: If you have a cervix, begin getting regular cervical cancer screening tests starting at age 21.  Breast cancer scan (mammogram): If you've ever had breasts, begin having regular mammograms starting at age 40. This is a scan to check for breast cancer.  Colon cancer screening: It is important to start screening for colon cancer at age 45.  Have a colonoscopy test every 10 years (or more often if you're at risk) Or, ask your provider about stool tests like a FIT test every year or Cologuard test every 3 years.  To learn more about your testing options, visit:   .  For help making a decision, visit:   https://bit.ly/ji59646.  Prostate cancer screening test: If you have a prostate, ask your care team if a prostate cancer screening test (PSA) at age 55 is right for you.  Lung cancer screening: If you are a current or former smoker ages 50 to 80, ask your care team if ongoing lung cancer screenings are right for you.  For informational purposes only. Not to replace the advice of your health care provider. Copyright   2023 [employer]. All rights reserved. Clinically reviewed by the [facility] Transitions Program. [employer] 593164 - REV 01/24.  Learning About Sleeping Well  What does sleeping well  mean?     Sleeping well means getting enough sleep to feel good and stay healthy. How much sleep is enough varies among people.  The number of hours you sleep and how you feel when you wake up are both important. If you do not feel refreshed, you probably need more sleep. Another sign of not getting enough sleep is feeling tired during the day.  Experts recommend that adults get at least 7 or more hours of sleep per day. Children and older adults need more sleep.  Why is getting enough sleep important?  Getting enough quality sleep is a basic part of good health. When your sleep suffers, your physical health, mood, and your thoughts can suffer too. You may find yourself feeling more grumpy or stressed. Not getting enough sleep also can lead to serious problems, including injury, accidents, anxiety, and depression.  What might cause poor sleeping?  Many things can cause sleep problems, including:  Changes to your sleep schedule.  Stress. Stress can be caused by fear about a single event, such as giving a speech. Or you may have ongoing stress, such as worry about work or school.  Depression, anxiety, and other mental or emotional conditions.  Changes in your sleep habits or surroundings. This includes changes that happen where you sleep, such as noise, light, or sleeping in a different bed. It also includes changes in your sleep pattern, such as having jet lag or working a late shift.  Health problems, such as pain, breathing problems, and restless legs syndrome.  Lack of regular exercise.  Using alcohol, nicotine, or caffeine before bed.  How can you help yourself?  Here are some tips that may help you sleep more soundly and wake up feeling more refreshed.  Your sleeping area   Use your bedroom only for sleeping and sex. A bit of light reading may help you fall asleep. But if it doesn't, do your reading elsewhere in the house. Try not to use your TV, computer, smartphone, or tablet while you are in bed.  Be sure  "your bed is big enough to stretch out comfortably, especially if you have a sleep partner.  Keep your bedroom quiet, dark, and cool. Use curtains, blinds, or a sleep mask to block out light. To block out noise, use earplugs, soothing music, or a \"white noise\" machine.  Your evening and bedtime routine   Create a relaxing bedtime routine. You might want to take a warm shower or bath, or listen to soothing music.  Go to bed at the same time every night. And get up at the same time every morning, even if you feel tired.  What to avoid   Limit caffeine (coffee, tea, caffeinated sodas) during the day, and don't have any for at least 6 hours before bedtime.  Avoid drinking alcohol before bedtime. Alcohol can cause you to wake up more often during the night.  Try not to smoke or use tobacco, especially in the evening. Nicotine can keep you awake.  Limit naps during the day, especially close to bedtime.  Avoid lying in bed awake for too long. If you can't fall asleep or if you wake up in the middle of the night and can't get back to sleep within about 20 minutes, get out of bed and go to another room until you feel sleepy.  Avoid taking medicine right before bed that may keep you awake or make you feel hyper or energized. Your doctor can tell you if your medicine may do this and if you can take it earlier in the day.  If you can't sleep   Imagine yourself in a peaceful, pleasant scene. Focus on the details and feelings of being in a place that is relaxing.  Get up and do a quiet or boring activity until you feel sleepy.  Avoid drinking any liquids before going to bed to help prevent waking up often to use the bathroom.  Where can you learn more?  Go to https://www.[employer].net/patiented  Enter J942 in the search box to learn more about \"Learning About Sleeping Well.\"  Current as of: July 10, 2023  Content Version: 14.2 2024 [facility].   Care instructions adapted under license by your healthcare " professional. If you have questions about a medical condition or this instruction, always ask your healthcare professional. [employer] disclaims any warranty or liability for your use of this information.

## 2024-10-08 LAB
HPV HR 12 DNA CVX QL NAA+PROBE: POSITIVE
HPV16 DNA CVX QL NAA+PROBE: NEGATIVE
HPV18 DNA CVX QL NAA+PROBE: NEGATIVE
HUMAN PAPILLOMA VIRUS FINAL DIAGNOSIS: ABNORMAL

## 2024-10-11 ENCOUNTER — PATIENT OUTREACH (OUTPATIENT)
Dept: FAMILY MEDICINE | Facility: CLINIC | Age: 34
End: 2024-10-11
Payer: COMMERCIAL

## 2024-10-11 DIAGNOSIS — R87.810 CERVICAL HIGH RISK HPV (HUMAN PAPILLOMAVIRUS) TEST POSITIVE: Primary | ICD-10-CM

## 2024-10-11 LAB
BKR AP ASSOCIATED HPV REPORT: ABNORMAL
BKR LAB AP GYN ADEQUACY: ABNORMAL
BKR LAB AP GYN INTERPRETATION: ABNORMAL
BKR LAB AP PREVIOUS ABNL DX: ABNORMAL
BKR LAB AP PREVIOUS ABNORMAL: ABNORMAL
PATH REPORT.COMMENTS IMP SPEC: ABNORMAL
PATH REPORT.COMMENTS IMP SPEC: ABNORMAL
PATH REPORT.RELEVANT HX SPEC: ABNORMAL

## 2024-12-05 ENCOUNTER — OFFICE VISIT (OUTPATIENT)
Dept: OBGYN | Facility: CLINIC | Age: 34
End: 2024-12-05
Payer: COMMERCIAL

## 2024-12-05 VITALS
OXYGEN SATURATION: 100 % | BODY MASS INDEX: 23.96 KG/M2 | WEIGHT: 153 LBS | DIASTOLIC BLOOD PRESSURE: 68 MMHG | SYSTOLIC BLOOD PRESSURE: 122 MMHG | HEART RATE: 69 BPM

## 2024-12-05 DIAGNOSIS — R87.810 ASCUS WITH POSITIVE HIGH RISK HPV CERVICAL: Primary | ICD-10-CM

## 2024-12-05 DIAGNOSIS — R87.610 ASCUS WITH POSITIVE HIGH RISK HPV CERVICAL: Primary | ICD-10-CM

## 2024-12-05 LAB — HCG UR QL: NEGATIVE

## 2024-12-05 NOTE — PROGRESS NOTES
GYN Procedure Note   Office Colposcopy     CC: Evaluation of a ASCUS with +HR HPV pap smear. She also reports that she was told that she has an endocervical polyp during her last pap smear and was just starting to have some spotting over the past cycle.     HPI:  Patient's pap smear history is as follows:  02/25/21 NIL Pap, + HR HPV (not 16 or 18). Plan cotest in 1 year due 02/25/22.   03/4/21 Msg left on voice mail to return call or advised patient to review results and recommendations that were released through [employer].  03/09/21 Left message for patient to call back or informed pt to view [employer] message.  03/16/21 Letter sent. Pt didn't return the call or view [employer] message.   02/09/22 Reminder Mychart, Letter  3/28/22 Pre-op appt -- added to visit notes pap due in case it can be done then  04/29/22 Reminder call-lm  5/24/22 Lost to follow-up for pap tracking  8/11/22 NIL, +HR HPV, not 16/18. Plan updated plan to repeat co-test in 6-9 months  8/25/22 Left message and [employer] letter / notified and MyChart sent with updated recommendation from provider  4/25/23 Reminder MyChart  05/17/23 NIL Pap, +HR HPV (not 16 or 18) Plan Cadet due bef 08/17/23 / notified  7/13/23 Reminder letter  8/11/23 Cadet not done. Tracking updated for 6 mo colp/pap due 11/17/23.   10/30/23 Reminder MyChart / Last read by Yumiko Marquez at  2:24 PM on 11/29/2023.   12/1/23 Lost to follow-up for pap tracking  10/7/24 ASCUS, +HR HPV, not 16/18. Plan Cadet bef 1/7/25 / notified and briefly discussed as [place] the airport at [place], MyChart letter also sent through.  12/5/24 Cadet:         Urine HCG: Negative     PROCEDURE NOTE:    We discussed that based on her pap testing we recommend further evaluation. She understands our recommendation to proceed with colposcopy. We discussed that colposcopy allows further evaluation of the cervix using magnification. I counseled the patient that biopsies may also be done at the time of the  colposcopy. I discussed the procedure of colposcopy and counseled her that the colposcope magnifies the appearance of the cervix. Acetic acid or vinegar is placed on the cervix and vagina to stain the cells and to allow the clinician to better see where the abnormal cells are located and the size of any abnormal areas. The size, type and location of abnormal cells help to determine which area or areas may need to be biopsied. This information will further determine how severe the abnormality is and also help to determine what treatment, if any, is needed. When monitored and treated early, pre-cancerous areas usually do not develop into cervical cancer.       Written consent was obtained after [facility] management guidelines were discussed and all patient's questions were answered. Final verification with two patient identifiers was performed.       Colposcopic exam of the cervix was performed before and after application of 3% acetic acid.  Colposcopy was adequate and the entire SCJ was visualized   Acetowhite lesions: from 6 to 10 o'clock and at 1 o'clock   Punctations: none  Mosaicism: none  Atypical vessels: none    2 biopsy(ies) taken. The endocervical polyp was grasped with a ring forceps and twisted around the base. The polyp was removed without difficulty and placed into a specimen container.  Hemostasis obtained with silver nitrate.  EBL was <5 mL.     Colposcopic impression: CAM I     Assessment/Plan  -Evaluation of a ASCUS pap smear   -Biopsies and ECC obtained, will contact patient with results and follow up plan   -Recommend pelvic rest for 72 hours, discussed bleeding/infection    Dispo: pending results of biopsy     Loulou Briggs MD

## 2024-12-05 NOTE — PATIENT INSTRUCTIONS

## 2024-12-09 ENCOUNTER — MYC MEDICAL ADVICE (OUTPATIENT)
Dept: FAMILY MEDICINE | Facility: CLINIC | Age: 34
End: 2024-12-09
Payer: COMMERCIAL

## 2024-12-10 NOTE — TELEPHONE ENCOUNTER
Dr. River,    Please see below [employer] message and advise.   Pt completed colposcopy on 12/05/24.     Thanks,   Erin HAYES RN

## 2024-12-12 ENCOUNTER — PATIENT OUTREACH (OUTPATIENT)
Dept: FAMILY MEDICINE | Facility: CLINIC | Age: 34
End: 2024-12-12
Payer: COMMERCIAL

## 2024-12-12 DIAGNOSIS — R87.810 CERVICAL HIGH RISK HPV (HUMAN PAPILLOMAVIRUS) TEST POSITIVE: Primary | ICD-10-CM

## 2024-12-17 NOTE — TELEPHONE ENCOUNTER
Please let her know that sounds great I do not have any concerns about her trying to conceive in the next 6 to 9 months.  Just starting prenatal vitamins now would be a good thing to do.    Aleta Holder MD

## 2025-01-29 ENCOUNTER — LAB (OUTPATIENT)
Dept: LAB | Facility: CLINIC | Age: 35
End: 2025-01-29
Payer: COMMERCIAL

## 2025-01-29 DIAGNOSIS — C73 PAPILLARY THYROID CARCINOMA (H): ICD-10-CM

## 2025-01-29 DIAGNOSIS — Z00.00 WELL ADULT EXAM: ICD-10-CM

## 2025-01-29 LAB
CHOLEST SERPL-MCNC: 214 MG/DL
FASTING STATUS PATIENT QL REPORTED: YES
FASTING STATUS PATIENT QL REPORTED: YES
GLUCOSE SERPL-MCNC: 88 MG/DL (ref 70–99)
HDLC SERPL-MCNC: 66 MG/DL
LDLC SERPL CALC-MCNC: 135 MG/DL
NONHDLC SERPL-MCNC: 148 MG/DL
TRIGL SERPL-MCNC: 67 MG/DL
TSH SERPL DL<=0.005 MIU/L-ACNC: 0.14 UIU/ML (ref 0.3–4.2)
VIT D+METAB SERPL-MCNC: 22 NG/ML (ref 20–50)

## 2025-01-29 PROCEDURE — 80061 LIPID PANEL: CPT

## 2025-01-29 PROCEDURE — 36415 COLL VENOUS BLD VENIPUNCTURE: CPT

## 2025-01-29 PROCEDURE — 84443 ASSAY THYROID STIM HORMONE: CPT

## 2025-01-29 PROCEDURE — 82947 ASSAY GLUCOSE BLOOD QUANT: CPT

## 2025-01-29 PROCEDURE — 84432 ASSAY OF THYROGLOBULIN: CPT | Mod: 90

## 2025-01-29 PROCEDURE — 82306 VITAMIN D 25 HYDROXY: CPT

## 2025-01-29 PROCEDURE — 99000 SPECIMEN HANDLING OFFICE-LAB: CPT

## 2025-01-29 PROCEDURE — 86800 THYROGLOBULIN ANTIBODY: CPT | Mod: 90

## 2025-02-03 LAB — SCANNED LAB RESULT: NORMAL

## 2025-02-06 ENCOUNTER — TELEPHONE (OUTPATIENT)
Dept: ENDOCRINOLOGY | Facility: CLINIC | Age: 35
End: 2025-02-06
Payer: COMMERCIAL

## 2025-02-06 NOTE — TELEPHONE ENCOUNTER
Called pt - results noted below    -  February 6, 2025      Yumiko Marquez  [address]  [facility] [id number]        Dear Yumiko    Here are your labs - your TSH is appropiately suppressed and your thyroglobulin is good at 0.22    If you have any questions, please feel free to contact my nurse at [phone] select option #3 for triage nurse  or  option #1 for scheduling related questions.    Regards    Marzena Lagunas MD   -    -   January 2025 TSH  at 0.14 and your thyroglobulin is good at 0.22    July 2024 TSH 1.01, thyroglobulin 0.38     May 17, 2023 thyroglobulin antibody is undetectable, thyroglobulin 0.28, TSH 0.10, free t4 at 1.89     October 2022: TSH 2.68, thyroglobulin antibody undetectable, thyroglobulin 0.53     Pt completion thyroidectomy on 4/12/22 February 2022 TSH each normal, thyroglobulin around 19.6 (still has remaining left thyroid)       Resulted Orders   TSH   Result Value Ref Range    TSH 0.14 (L) 0.30 - 4.20 uIU/mL   Thyroglobulin and Antibody (Sendout to [facility])   Result Value Ref Range    See Scanned Result       THYROGLOBULIN AND ANTIBODY (SENDOUT TO [facility])-Scanned

## 2025-05-28 ENCOUNTER — E-VISIT (OUTPATIENT)
Dept: FAMILY MEDICINE | Facility: CLINIC | Age: 35
End: 2025-05-28
Payer: COMMERCIAL

## 2025-05-28 DIAGNOSIS — L23.7 CONTACT DERMATITIS DUE TO POISON IVY: Primary | ICD-10-CM

## 2025-08-04 ENCOUNTER — ANCILLARY PROCEDURE (OUTPATIENT)
Dept: ULTRASOUND IMAGING | Facility: CLINIC | Age: 35
End: 2025-08-04
Attending: INTERNAL MEDICINE
Payer: COMMERCIAL

## 2025-08-04 ENCOUNTER — LAB (OUTPATIENT)
Dept: LAB | Facility: CLINIC | Age: 35
End: 2025-08-04
Payer: COMMERCIAL

## 2025-08-04 ENCOUNTER — ANCILLARY PROCEDURE (OUTPATIENT)
Dept: GENERAL RADIOLOGY | Facility: CLINIC | Age: 35
End: 2025-08-04
Attending: INTERNAL MEDICINE
Payer: COMMERCIAL

## 2025-08-04 DIAGNOSIS — C73 PAPILLARY THYROID CARCINOMA (H): ICD-10-CM

## 2025-08-04 LAB — TSH SERPL DL<=0.005 MIU/L-ACNC: 0.12 UIU/ML (ref 0.3–4.2)

## 2025-08-04 PROCEDURE — 76536 US EXAM OF HEAD AND NECK: CPT | Performed by: RADIOLOGY

## 2025-08-04 PROCEDURE — 71046 X-RAY EXAM CHEST 2 VIEWS: CPT | Performed by: RADIOLOGY

## 2025-08-04 PROCEDURE — 84443 ASSAY THYROID STIM HORMONE: CPT

## 2025-08-04 PROCEDURE — 84432 ASSAY OF THYROGLOBULIN: CPT | Mod: 90

## 2025-08-04 PROCEDURE — 99000 SPECIMEN HANDLING OFFICE-LAB: CPT

## 2025-08-04 PROCEDURE — 36415 COLL VENOUS BLD VENIPUNCTURE: CPT

## 2025-08-04 PROCEDURE — 86800 THYROGLOBULIN ANTIBODY: CPT | Mod: 90

## 2025-08-05 ENCOUNTER — RESULTS FOLLOW-UP (OUTPATIENT)
Dept: ENDOCRINOLOGY | Facility: CLINIC | Age: 35
End: 2025-08-05
Payer: COMMERCIAL

## 2025-08-07 LAB — SCANNED LAB RESULT: NORMAL

## (undated) DEVICE — ESU ELEC BLADE 2.75" COATED/INSULATED E1455

## (undated) DEVICE — SU SILK 3-0 TIE 12X30" A304H

## (undated) DEVICE — LINEN TOWEL PACK X5 5464

## (undated) DEVICE — ESU LIGASURE OPEN SEALER/DIVIDER SM JAW 16.5MM LF1212A

## (undated) DEVICE — NIM PROBE PRASS INCREMENTING TIP 8225825

## (undated) DEVICE — PREP CHLORAPREP W/ORANGE TINT 10.5ML 260715

## (undated) DEVICE — GOWN LG DISP 9515

## (undated) DEVICE — Device

## (undated) DEVICE — ADH SKIN CLOSURE PREMIERPRO EXOFIN 1.0ML 3470

## (undated) DEVICE — CLIP HORIZON MED BLUE 002200

## (undated) DEVICE — SU SILK 2-0 TIE 12X30" A305H

## (undated) DEVICE — CLIP HORIZON SM RED WIDE SLOT 001201

## (undated) DEVICE — SOL NACL 0.9% IRRIG 500ML BOTTLE 2F7123

## (undated) DEVICE — SURGICEL FIBRILLAR HEMOSTAT 1"X2" 1961

## (undated) DEVICE — PACK ENT MINOR CUSTOM ASC

## (undated) DEVICE — SU MONOCRYL 5-0 P-3 18" UND Y493G

## (undated) DEVICE — PREP PAD ALCOHOL 6818

## (undated) DEVICE — ESU PENCIL SMOKE EVAC W/ROCKER SWITCH 0703-047-000

## (undated) DEVICE — ESU GROUND PAD ADULT W/CORD E7507

## (undated) DEVICE — DRSG TEGADERM 2 3/8X2 3/4" 1624W

## (undated) DEVICE — SPECIMEN CONTAINER W/10% BUFFERED FORMALIN 120ML 591201

## (undated) DEVICE — GLOVE PROTEXIS W/NEU-THERA 6.5  2D73TE65

## (undated) DEVICE — SU CHROMIC 3-0 SH 27" G122H

## (undated) DEVICE — TUBE ENDOTRACHEAL NIM TRIVANTAGE 6.0MM 8229706

## (undated) DEVICE — SUCTION MANIFOLD NEPTUNE 2 SYS 1 PORT 702-025-000

## (undated) RX ORDER — OXYCODONE HYDROCHLORIDE 5 MG/1
TABLET ORAL
Status: DISPENSED
Start: 2022-04-12

## (undated) RX ORDER — PROPOFOL 10 MG/ML
INJECTION, EMULSION INTRAVENOUS
Status: DISPENSED
Start: 2022-04-12

## (undated) RX ORDER — PROPOFOL 10 MG/ML
INJECTION, EMULSION INTRAVENOUS
Status: DISPENSED
Start: 2019-11-26

## (undated) RX ORDER — EPHEDRINE SULFATE 50 MG/ML
INJECTION, SOLUTION INTRAMUSCULAR; INTRAVENOUS; SUBCUTANEOUS
Status: DISPENSED
Start: 2019-11-26

## (undated) RX ORDER — ONDANSETRON 2 MG/ML
INJECTION INTRAMUSCULAR; INTRAVENOUS
Status: DISPENSED
Start: 2019-11-26

## (undated) RX ORDER — GABAPENTIN 300 MG/1
CAPSULE ORAL
Status: DISPENSED
Start: 2019-11-26

## (undated) RX ORDER — GABAPENTIN 300 MG/1
CAPSULE ORAL
Status: DISPENSED
Start: 2022-04-12

## (undated) RX ORDER — LIDOCAINE HYDROCHLORIDE 20 MG/ML
INJECTION, SOLUTION EPIDURAL; INFILTRATION; INTRACAUDAL; PERINEURAL
Status: DISPENSED
Start: 2022-04-12

## (undated) RX ORDER — REMIFENTANIL HYDROCHLORIDE 1 MG/ML
INJECTION, POWDER, LYOPHILIZED, FOR SOLUTION INTRAVENOUS
Status: DISPENSED
Start: 2019-11-26

## (undated) RX ORDER — OXYCODONE HYDROCHLORIDE 5 MG/1
TABLET ORAL
Status: DISPENSED
Start: 2019-11-26

## (undated) RX ORDER — FENTANYL CITRATE 50 UG/ML
INJECTION, SOLUTION INTRAMUSCULAR; INTRAVENOUS
Status: DISPENSED
Start: 2019-11-26

## (undated) RX ORDER — DEXAMETHASONE SODIUM PHOSPHATE 10 MG/ML
INJECTION, SOLUTION INTRAMUSCULAR; INTRAVENOUS
Status: DISPENSED
Start: 2022-04-12

## (undated) RX ORDER — DEXAMETHASONE SODIUM PHOSPHATE 10 MG/ML
INJECTION, SOLUTION INTRAMUSCULAR; INTRAVENOUS
Status: DISPENSED
Start: 2019-11-26

## (undated) RX ORDER — REMIFENTANIL HYDROCHLORIDE 1 MG/ML
INJECTION, POWDER, LYOPHILIZED, FOR SOLUTION INTRAVENOUS
Status: DISPENSED
Start: 2022-04-12

## (undated) RX ORDER — ACETAMINOPHEN 325 MG/1
TABLET ORAL
Status: DISPENSED
Start: 2019-11-26

## (undated) RX ORDER — GLYCOPYRROLATE 0.2 MG/ML
INJECTION INTRAMUSCULAR; INTRAVENOUS
Status: DISPENSED
Start: 2019-11-26

## (undated) RX ORDER — ACETAMINOPHEN 325 MG/1
TABLET ORAL
Status: DISPENSED
Start: 2022-04-12

## (undated) RX ORDER — LIDOCAINE HYDROCHLORIDE 20 MG/ML
INJECTION, SOLUTION EPIDURAL; INFILTRATION; INTRACAUDAL; PERINEURAL
Status: DISPENSED
Start: 2019-11-26

## (undated) RX ORDER — ONDANSETRON 2 MG/ML
INJECTION INTRAMUSCULAR; INTRAVENOUS
Status: DISPENSED
Start: 2022-04-12

## (undated) RX ORDER — FENTANYL CITRATE 50 UG/ML
INJECTION, SOLUTION INTRAMUSCULAR; INTRAVENOUS
Status: DISPENSED
Start: 2022-04-12

## (undated) RX ORDER — HYDROMORPHONE HYDROCHLORIDE 1 MG/ML
INJECTION, SOLUTION INTRAMUSCULAR; INTRAVENOUS; SUBCUTANEOUS
Status: DISPENSED
Start: 2022-04-12

## (undated) RX ORDER — HYDROMORPHONE HYDROCHLORIDE 1 MG/ML
INJECTION, SOLUTION INTRAMUSCULAR; INTRAVENOUS; SUBCUTANEOUS
Status: DISPENSED
Start: 2019-11-26

## (undated) RX ORDER — LIDOCAINE HYDROCHLORIDE 10 MG/ML
INJECTION, SOLUTION EPIDURAL; INFILTRATION; INTRACAUDAL; PERINEURAL
Status: DISPENSED
Start: 2019-10-04